# Patient Record
Sex: FEMALE | Race: WHITE | NOT HISPANIC OR LATINO
[De-identification: names, ages, dates, MRNs, and addresses within clinical notes are randomized per-mention and may not be internally consistent; named-entity substitution may affect disease eponyms.]

---

## 2022-05-04 PROBLEM — Z00.00 ENCOUNTER FOR PREVENTIVE HEALTH EXAMINATION: Status: ACTIVE | Noted: 2022-05-04

## 2022-05-10 ENCOUNTER — APPOINTMENT (OUTPATIENT)
Dept: CARDIOLOGY | Facility: CLINIC | Age: 82
End: 2022-05-10
Payer: COMMERCIAL

## 2022-05-10 VITALS — DIASTOLIC BLOOD PRESSURE: 80 MMHG | SYSTOLIC BLOOD PRESSURE: 135 MMHG

## 2022-05-10 VITALS — HEART RATE: 69 BPM | DIASTOLIC BLOOD PRESSURE: 90 MMHG | SYSTOLIC BLOOD PRESSURE: 136 MMHG

## 2022-05-10 VITALS — HEIGHT: 60 IN | WEIGHT: 177 LBS | TEMPERATURE: 97.6 F | BODY MASS INDEX: 34.75 KG/M2

## 2022-05-10 PROCEDURE — 99203 OFFICE O/P NEW LOW 30 MIN: CPT

## 2022-05-10 PROCEDURE — 93000 ELECTROCARDIOGRAM COMPLETE: CPT

## 2022-05-13 NOTE — REVIEW OF SYSTEMS
[Feeling Fatigued] : feeling fatigued [SOB] : shortness of breath [Dyspnea on exertion] : not dyspnea during exertion [Chest Discomfort] : no chest discomfort [Lower Ext Edema] : no extremity edema [Leg Claudication] : no intermittent leg claudication [Palpitations] : no palpitations [Orthopnea] : no orthopnea [Syncope] : no syncope [Cough] : no cough [Dizziness] : no dizziness

## 2022-05-13 NOTE — ASSESSMENT
[FreeTextEntry1] : Assessment:\par #Dyspnea on exertion\par - Has risk factors for CAD, need to rule out ischemia, valvular disease\par #DLD\par - 4/21/22 , , HDL 66,  not on statin\par #BMI 34\par \par Plan:\par - Start atorva 10 mg daily \par - TTE\par - Pharm nuc stress test \par - Counseled patient on diet, referral to dietician \par - Discussed with her PCP Dr. Caballero\par - Return to clinic in 1 month after testing\par \par

## 2022-05-13 NOTE — HISTORY OF PRESENT ILLNESS
[FreeTextEntry1] : 81F  (no gDM, gHTN, or preeclampsia) with DLD, hemorrhagic stroke 2010 s/p embo with residual L sided weakness here for evaluation of dyspnea on exertion. \par \par SOB with 1 flight of stairs, less than 50 feet. Takes 2-3 minutes to recover. No SOB at rest. No chest pain, unsure of leg swelling. \par \par Joint pain neck, back knees shoulders. Sciatica, recently prescribed prednisone. \par \par Quits smoking in her 20s \par \par No history of T2DM, never on treatment for hypertension\par \par BP at home\par 160/90 \par over 140 every other day \par \par no FMH CAD\par \par Has 9 cats\par \par

## 2022-05-13 NOTE — PHYSICAL EXAM
[Well Developed] : well developed [Well Nourished] : well nourished [No Acute Distress] : no acute distress [Normal Conjunctiva] : normal conjunctiva [Normal Venous Pressure] : normal venous pressure [No Carotid Bruit] : no carotid bruit [Normal S1, S2] : normal S1, S2 [No Murmur] : no murmur [No Rub] : no rub [No Gallop] : no gallop [Clear Lung Fields] : clear lung fields [Good Air Entry] : good air entry [No Respiratory Distress] : no respiratory distress  [Soft] : abdomen soft [Non Tender] : non-tender [Normal Bowel Sounds] : normal bowel sounds [Moves all extremities] : moves all extremities [No ulcers] : no ulcers [No edema] : no edema [No varicosities] : no varicosities [No chronic venous stasis changes] : no chronic venous stasis changes [No cyanosis] : no cyanosis [No rashes] : no rashes [Present] : present bilaterally [de-identified] : LUE/LLE weaker compared to RUE/RLE

## 2022-05-16 ENCOUNTER — TRANSCRIPTION ENCOUNTER (OUTPATIENT)
Age: 82
End: 2022-05-16

## 2022-06-06 ENCOUNTER — APPOINTMENT (OUTPATIENT)
Dept: CARDIOLOGY | Facility: CLINIC | Age: 82
End: 2022-06-06
Payer: COMMERCIAL

## 2022-06-06 PROCEDURE — 93306 TTE W/DOPPLER COMPLETE: CPT

## 2022-06-20 ENCOUNTER — RESULT REVIEW (OUTPATIENT)
Age: 82
End: 2022-06-20

## 2022-06-20 ENCOUNTER — OUTPATIENT (OUTPATIENT)
Dept: OUTPATIENT SERVICES | Facility: HOSPITAL | Age: 82
LOS: 1 days | Discharge: HOME | End: 2022-06-20
Payer: COMMERCIAL

## 2022-06-20 DIAGNOSIS — R06.00 DYSPNEA, UNSPECIFIED: ICD-10-CM

## 2022-06-20 PROCEDURE — 78452 HT MUSCLE IMAGE SPECT MULT: CPT | Mod: 26

## 2022-06-21 ENCOUNTER — APPOINTMENT (OUTPATIENT)
Dept: CARDIOLOGY | Facility: CLINIC | Age: 82
End: 2022-06-21
Payer: COMMERCIAL

## 2022-06-21 VITALS
WEIGHT: 178.6 LBS | DIASTOLIC BLOOD PRESSURE: 98 MMHG | TEMPERATURE: 97.2 F | SYSTOLIC BLOOD PRESSURE: 142 MMHG | BODY MASS INDEX: 35.06 KG/M2 | HEART RATE: 88 BPM | HEIGHT: 60 IN

## 2022-06-21 DIAGNOSIS — Z87.39 PERSONAL HISTORY OF OTHER DISEASES OF THE MUSCULOSKELETAL SYSTEM AND CONNECTIVE TISSUE: ICD-10-CM

## 2022-06-21 DIAGNOSIS — R06.02 SHORTNESS OF BREATH: ICD-10-CM

## 2022-06-21 DIAGNOSIS — Z86.79 PERSONAL HISTORY OF OTHER DISEASES OF THE CIRCULATORY SYSTEM: ICD-10-CM

## 2022-06-21 DIAGNOSIS — Z87.891 PERSONAL HISTORY OF NICOTINE DEPENDENCE: ICD-10-CM

## 2022-06-21 PROCEDURE — 99214 OFFICE O/P EST MOD 30 MIN: CPT

## 2022-06-21 RX ORDER — LEVOTHYROXINE SODIUM 0.03 MG/1
25 TABLET ORAL
Qty: 30 | Refills: 0 | Status: ACTIVE | COMMUNITY
Start: 2022-06-06

## 2022-06-22 PROBLEM — Z87.39 HISTORY OF BACKACHE: Status: RESOLVED | Noted: 2022-06-22 | Resolved: 2022-06-22

## 2022-06-22 PROBLEM — Z87.891 HISTORY OF TOBACCO USE: Status: ACTIVE | Noted: 2022-06-22

## 2022-06-22 PROBLEM — R06.02 SOB (SHORTNESS OF BREATH) ON EXERTION: Status: ACTIVE | Noted: 2022-06-22

## 2022-06-23 NOTE — HISTORY OF PRESENT ILLNESS
[FreeTextEntry1] : 82F  (no gDM, gHTN, or preeclampsia) with DLD, aneurysm, hemorrhagic stroke 2010 s/p embo with residual L sided weakness here for evaluation of dyspnea on exertion. \par \par Continues to have SOB. No chest pain, palpitations, lightheadedness/dizziness. \par \par \par BP at home\par ranging 130//90 \par over 140 every other day \par \par 5/10/22\par SOB with 1 flight of stairs, less than 50 feet. Takes 2-3 minutes to recover. No SOB at rest. No chest pain, unsure of leg swelling. \par \par Joint pain neck, back knees shoulders. Sciatica, recently prescribed prednisone. \par \par Quits smoking in her 20s \par \par No history of T2DM, never on treatment for hypertension\par \par \par \par \par

## 2022-06-23 NOTE — PHYSICAL EXAM
[Well Developed] : well developed [Well Nourished] : well nourished [No Acute Distress] : no acute distress [Normal Conjunctiva] : normal conjunctiva [Normal Venous Pressure] : normal venous pressure [No Carotid Bruit] : no carotid bruit [Normal S1, S2] : normal S1, S2 [No Murmur] : no murmur [No Rub] : no rub [No Gallop] : no gallop [Clear Lung Fields] : clear lung fields [Good Air Entry] : good air entry [No Respiratory Distress] : no respiratory distress  [Soft] : abdomen soft [Non Tender] : non-tender [Moves all extremities] : moves all extremities [No ulcers] : no ulcers [No edema] : no edema [No varicosities] : no varicosities [No chronic venous stasis changes] : no chronic venous stasis changes [No cyanosis] : no cyanosis [No rashes] : no rashes [Present] : present bilaterally [de-identified] : LUE/LLE weaker compared to RUE/RLE

## 2022-06-23 NOTE — ASSESSMENT
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

## 2022-06-23 NOTE — REVIEW OF SYSTEMS
[Feeling Fatigued] : feeling fatigued [SOB] : shortness of breath [Negative] : Heme/Lymph [Dyspnea on exertion] : dyspnea during exertion [Chest Discomfort] : no chest discomfort [Lower Ext Edema] : no extremity edema [Leg Claudication] : no intermittent leg claudication [Palpitations] : no palpitations [Orthopnea] : no orthopnea [Syncope] : no syncope [Cough] : no cough [Dizziness] : no dizziness

## 2022-06-23 NOTE — CARDIOLOGY SUMMARY
[de-identified] : EKG 5/10/22 Normal sinus rhythm 69 bpm [de-identified] : 6/8/22 TTE  EF 66%, no AI, no pHTN, hepatic cyst as per pt not a new finding  [de-identified] : Nuclear Stress test 6/21/22 EF 55% negative for ischemia\par

## 2022-08-17 ENCOUNTER — OUTPATIENT (OUTPATIENT)
Dept: OUTPATIENT SERVICES | Facility: HOSPITAL | Age: 82
LOS: 1 days | Discharge: HOME | End: 2022-08-17

## 2022-08-17 PROCEDURE — 93925 LOWER EXTREMITY STUDY: CPT | Mod: 26

## 2022-08-24 DIAGNOSIS — I73.9 PERIPHERAL VASCULAR DISEASE, UNSPECIFIED: ICD-10-CM

## 2022-11-16 ENCOUNTER — RX RENEWAL (OUTPATIENT)
Age: 82
End: 2022-11-16

## 2022-11-18 ENCOUNTER — APPOINTMENT (OUTPATIENT)
Dept: ORTHOPEDIC SURGERY | Facility: CLINIC | Age: 82
End: 2022-11-18

## 2022-11-18 VITALS — HEIGHT: 60 IN | WEIGHT: 180 LBS | BODY MASS INDEX: 35.34 KG/M2

## 2022-11-18 PROCEDURE — 73030 X-RAY EXAM OF SHOULDER: CPT | Mod: RT

## 2022-11-18 PROCEDURE — 99203 OFFICE O/P NEW LOW 30 MIN: CPT | Mod: 25

## 2022-11-18 PROCEDURE — 20610 DRAIN/INJ JOINT/BURSA W/O US: CPT | Mod: RT

## 2022-11-18 NOTE — PROCEDURE
[Large Joint Injection] : Large joint injection [Right] : of the right [Shoulder] : shoulder [Pain] : pain [Alcohol] : alcohol [Ethyl Chloride sprayed topically] : ethyl chloride sprayed topically [Sterile technique used] : sterile technique used [___ cc    1%] : Lidocaine ~Vcc of 1%  [___ cc    4mg] : Dexamethasone (Decadron) ~Vcc of 4 mg  [] : Patient tolerated procedure well

## 2022-11-26 NOTE — HISTORY OF PRESENT ILLNESS
[de-identified] : [age] year of female here for an evaluation of pain to the right shoulder, patient that this time the pain is quite severe, she woke up with severe pain, she is unable to lift her arm.  Patient states that she was seen by Dr. Murphy in the past, patient states that surgical treatment is out of question for her.\par  patient states that she has similar pain in the past she had a cortisone injection with significant improvement of symptoms .

## 2022-11-26 NOTE — DISCUSSION/SUMMARY
[de-identified] : IMPRESSION: Right shoulder pain -possible rotator cuff tear\par \par PLAN:  Patient was over a cortisone injection, patient agrees.  \par I offer physical therapy, patient kindly decline, patient will continue to take over-the-counter anti-inflammatories for pain.  \par Patient is taking oral prednisone.  Patient was advised to follow up on as per needed basis.\par \par FOLLOW-UP:PRN\par \par SUPERVISING PHYSICIAN DR. YOUSSEF\par

## 2022-11-26 NOTE — IMAGING
[de-identified] :  examination of the right shoulder, there is no swelling, no ecchymosis, patient has some atrophy of the rotator cuff, patient has decreased range of motion due to pain.  \par Decreased motor strength to all planes.  \par Negative drop-arm test.  \par Neurovascular intact.\par \par   X-ray of the right shoulder was done in office today, negative for any acute fracture or dislocation.

## 2022-12-26 ENCOUNTER — INPATIENT (INPATIENT)
Facility: HOSPITAL | Age: 82
LOS: 18 days | Discharge: HOME HEALTH | End: 2023-01-14
Attending: INTERNAL MEDICINE | Admitting: INTERNAL MEDICINE
Payer: COMMERCIAL

## 2022-12-26 VITALS
OXYGEN SATURATION: 96 % | HEIGHT: 61 IN | WEIGHT: 179.9 LBS | TEMPERATURE: 98 F | RESPIRATION RATE: 18 BRPM | DIASTOLIC BLOOD PRESSURE: 79 MMHG | SYSTOLIC BLOOD PRESSURE: 139 MMHG | HEART RATE: 123 BPM

## 2022-12-26 DIAGNOSIS — Z98.890 OTHER SPECIFIED POSTPROCEDURAL STATES: Chronic | ICD-10-CM

## 2022-12-26 DIAGNOSIS — Z90.49 ACQUIRED ABSENCE OF OTHER SPECIFIED PARTS OF DIGESTIVE TRACT: Chronic | ICD-10-CM

## 2022-12-26 LAB
ALBUMIN SERPL ELPH-MCNC: 3.9 G/DL — SIGNIFICANT CHANGE UP (ref 3.5–5.2)
ALP SERPL-CCNC: 93 U/L — SIGNIFICANT CHANGE UP (ref 30–115)
ALT FLD-CCNC: 14 U/L — SIGNIFICANT CHANGE UP (ref 0–41)
ANION GAP SERPL CALC-SCNC: 13 MMOL/L — SIGNIFICANT CHANGE UP (ref 7–14)
AST SERPL-CCNC: 23 U/L — SIGNIFICANT CHANGE UP (ref 0–41)
BASE EXCESS BLDV CALC-SCNC: 2.2 MMOL/L — SIGNIFICANT CHANGE UP (ref -2–3)
BASOPHILS # BLD AUTO: 0.05 K/UL — SIGNIFICANT CHANGE UP (ref 0–0.2)
BASOPHILS NFR BLD AUTO: 0.4 % — SIGNIFICANT CHANGE UP (ref 0–1)
BILIRUB SERPL-MCNC: 1 MG/DL — SIGNIFICANT CHANGE UP (ref 0.2–1.2)
BUN SERPL-MCNC: 14 MG/DL — SIGNIFICANT CHANGE UP (ref 10–20)
CA-I SERPL-SCNC: 1.09 MMOL/L — LOW (ref 1.15–1.33)
CALCIUM SERPL-MCNC: 9.2 MG/DL — SIGNIFICANT CHANGE UP (ref 8.4–10.5)
CHLORIDE SERPL-SCNC: 99 MMOL/L — SIGNIFICANT CHANGE UP (ref 98–110)
CO2 SERPL-SCNC: 23 MMOL/L — SIGNIFICANT CHANGE UP (ref 17–32)
CREAT SERPL-MCNC: 0.8 MG/DL — SIGNIFICANT CHANGE UP (ref 0.7–1.5)
EGFR: 74 ML/MIN/1.73M2 — SIGNIFICANT CHANGE UP
EOSINOPHIL # BLD AUTO: 0.17 K/UL — SIGNIFICANT CHANGE UP (ref 0–0.7)
EOSINOPHIL NFR BLD AUTO: 1.4 % — SIGNIFICANT CHANGE UP (ref 0–8)
FLUAV AG NPH QL: SIGNIFICANT CHANGE UP
FLUBV AG NPH QL: SIGNIFICANT CHANGE UP
GAS PNL BLDV: 133 MMOL/L — LOW (ref 136–145)
GAS PNL BLDV: SIGNIFICANT CHANGE UP
GLUCOSE SERPL-MCNC: 94 MG/DL — SIGNIFICANT CHANGE UP (ref 70–99)
HCO3 BLDV-SCNC: 25 MMOL/L — SIGNIFICANT CHANGE UP (ref 22–29)
HCT VFR BLD CALC: 41.7 % — SIGNIFICANT CHANGE UP (ref 37–47)
HCT VFR BLDA CALC: 39 % — SIGNIFICANT CHANGE UP (ref 39–51)
HGB BLD-MCNC: 13.3 G/DL — SIGNIFICANT CHANGE UP (ref 12–16)
IMM GRANULOCYTES NFR BLD AUTO: 0.4 % — HIGH (ref 0.1–0.3)
LACTATE BLDV-MCNC: 1.5 MMOL/L — SIGNIFICANT CHANGE UP (ref 0.5–2)
LIDOCAIN IGE QN: 19 U/L — SIGNIFICANT CHANGE UP (ref 7–60)
LYMPHOCYTES # BLD AUTO: 1.67 K/UL — SIGNIFICANT CHANGE UP (ref 1.2–3.4)
LYMPHOCYTES # BLD AUTO: 13.9 % — LOW (ref 20.5–51.1)
MAGNESIUM SERPL-MCNC: 2 MG/DL — SIGNIFICANT CHANGE UP (ref 1.8–2.4)
MCHC RBC-ENTMCNC: 30.5 PG — SIGNIFICANT CHANGE UP (ref 27–31)
MCHC RBC-ENTMCNC: 31.9 G/DL — LOW (ref 32–37)
MCV RBC AUTO: 95.6 FL — SIGNIFICANT CHANGE UP (ref 81–99)
MONOCYTES # BLD AUTO: 1.1 K/UL — HIGH (ref 0.1–0.6)
MONOCYTES NFR BLD AUTO: 9.2 % — SIGNIFICANT CHANGE UP (ref 1.7–9.3)
NEUTROPHILS # BLD AUTO: 8.97 K/UL — HIGH (ref 1.4–6.5)
NEUTROPHILS NFR BLD AUTO: 74.7 % — SIGNIFICANT CHANGE UP (ref 42.2–75.2)
NRBC # BLD: 0 /100 WBCS — SIGNIFICANT CHANGE UP (ref 0–0)
NT-PROBNP SERPL-SCNC: 144 PG/ML — SIGNIFICANT CHANGE UP (ref 0–300)
PCO2 BLDV: 33 MMHG — LOW (ref 39–42)
PH BLDV: 7.49 — HIGH (ref 7.32–7.43)
PLATELET # BLD AUTO: 233 K/UL — SIGNIFICANT CHANGE UP (ref 130–400)
PO2 BLDV: 46 MMHG — SIGNIFICANT CHANGE UP
POTASSIUM BLDV-SCNC: 4.5 MMOL/L — SIGNIFICANT CHANGE UP (ref 3.5–5.1)
POTASSIUM SERPL-MCNC: 4.5 MMOL/L — SIGNIFICANT CHANGE UP (ref 3.5–5)
POTASSIUM SERPL-SCNC: 4.5 MMOL/L — SIGNIFICANT CHANGE UP (ref 3.5–5)
PROT SERPL-MCNC: 6.4 G/DL — SIGNIFICANT CHANGE UP (ref 6–8)
RBC # BLD: 4.36 M/UL — SIGNIFICANT CHANGE UP (ref 4.2–5.4)
RBC # FLD: 13.2 % — SIGNIFICANT CHANGE UP (ref 11.5–14.5)
RSV RNA NPH QL NAA+NON-PROBE: SIGNIFICANT CHANGE UP
SARS-COV-2 RNA SPEC QL NAA+PROBE: SIGNIFICANT CHANGE UP
SODIUM SERPL-SCNC: 135 MMOL/L — SIGNIFICANT CHANGE UP (ref 135–146)
TROPONIN T SERPL-MCNC: <0.01 NG/ML — SIGNIFICANT CHANGE UP
WBC # BLD: 12.01 K/UL — HIGH (ref 4.8–10.8)
WBC # FLD AUTO: 12.01 K/UL — HIGH (ref 4.8–10.8)

## 2022-12-26 PROCEDURE — 99285 EMERGENCY DEPT VISIT HI MDM: CPT

## 2022-12-26 PROCEDURE — 99223 1ST HOSP IP/OBS HIGH 75: CPT

## 2022-12-26 PROCEDURE — 93010 ELECTROCARDIOGRAM REPORT: CPT

## 2022-12-26 PROCEDURE — 71275 CT ANGIOGRAPHY CHEST: CPT | Mod: 26,MA

## 2022-12-26 PROCEDURE — 71045 X-RAY EXAM CHEST 1 VIEW: CPT | Mod: 26

## 2022-12-26 RX ORDER — MORPHINE SULFATE 50 MG/1
4 CAPSULE, EXTENDED RELEASE ORAL ONCE
Refills: 0 | Status: DISCONTINUED | OUTPATIENT
Start: 2022-12-26 | End: 2022-12-26

## 2022-12-26 RX ORDER — SODIUM CHLORIDE 9 MG/ML
500 INJECTION INTRAMUSCULAR; INTRAVENOUS; SUBCUTANEOUS ONCE
Refills: 0 | Status: COMPLETED | OUTPATIENT
Start: 2022-12-26 | End: 2022-12-26

## 2022-12-26 RX ORDER — MORPHINE SULFATE 50 MG/1
4 CAPSULE, EXTENDED RELEASE ORAL EVERY 4 HOURS
Refills: 0 | Status: DISCONTINUED | OUTPATIENT
Start: 2022-12-26 | End: 2023-01-02

## 2022-12-26 RX ORDER — AMLODIPINE BESYLATE 2.5 MG/1
2.5 TABLET ORAL DAILY
Refills: 0 | Status: DISCONTINUED | OUTPATIENT
Start: 2022-12-26 | End: 2023-01-14

## 2022-12-26 RX ORDER — TRAMADOL HYDROCHLORIDE 50 MG/1
50 TABLET ORAL EVERY 4 HOURS
Refills: 0 | Status: DISCONTINUED | OUTPATIENT
Start: 2022-12-26 | End: 2023-01-02

## 2022-12-26 RX ORDER — ENOXAPARIN SODIUM 100 MG/ML
40 INJECTION SUBCUTANEOUS EVERY 24 HOURS
Refills: 0 | Status: DISCONTINUED | OUTPATIENT
Start: 2022-12-26 | End: 2023-01-14

## 2022-12-26 RX ORDER — AZITHROMYCIN 500 MG/1
500 TABLET, FILM COATED ORAL EVERY 24 HOURS
Refills: 0 | Status: DISCONTINUED | OUTPATIENT
Start: 2022-12-26 | End: 2022-12-28

## 2022-12-26 RX ORDER — IPRATROPIUM/ALBUTEROL SULFATE 18-103MCG
3 AEROSOL WITH ADAPTER (GRAM) INHALATION EVERY 6 HOURS
Refills: 0 | Status: DISCONTINUED | OUTPATIENT
Start: 2022-12-26 | End: 2023-01-14

## 2022-12-26 RX ORDER — CEFTRIAXONE 500 MG/1
1000 INJECTION, POWDER, FOR SOLUTION INTRAMUSCULAR; INTRAVENOUS EVERY 24 HOURS
Refills: 0 | Status: DISCONTINUED | OUTPATIENT
Start: 2022-12-26 | End: 2022-12-28

## 2022-12-26 RX ORDER — ACETAMINOPHEN 500 MG
650 TABLET ORAL EVERY 6 HOURS
Refills: 0 | Status: DISCONTINUED | OUTPATIENT
Start: 2022-12-26 | End: 2023-01-03

## 2022-12-26 RX ORDER — ALBUTEROL 90 UG/1
1 AEROSOL, METERED ORAL EVERY 6 HOURS
Refills: 0 | Status: DISCONTINUED | OUTPATIENT
Start: 2022-12-26 | End: 2023-01-14

## 2022-12-26 RX ORDER — AZITHROMYCIN 500 MG/1
500 TABLET, FILM COATED ORAL ONCE
Refills: 0 | Status: COMPLETED | OUTPATIENT
Start: 2022-12-26 | End: 2022-12-26

## 2022-12-26 RX ORDER — CEFTRIAXONE 500 MG/1
1000 INJECTION, POWDER, FOR SOLUTION INTRAMUSCULAR; INTRAVENOUS ONCE
Refills: 0 | Status: COMPLETED | OUTPATIENT
Start: 2022-12-26 | End: 2022-12-26

## 2022-12-26 RX ORDER — ATORVASTATIN CALCIUM 80 MG/1
10 TABLET, FILM COATED ORAL AT BEDTIME
Refills: 0 | Status: DISCONTINUED | OUTPATIENT
Start: 2022-12-26 | End: 2023-01-14

## 2022-12-26 RX ORDER — ACETAMINOPHEN 500 MG
975 TABLET ORAL ONCE
Refills: 0 | Status: COMPLETED | OUTPATIENT
Start: 2022-12-26 | End: 2022-12-26

## 2022-12-26 RX ORDER — LEVOTHYROXINE SODIUM 125 MCG
25 TABLET ORAL DAILY
Refills: 0 | Status: DISCONTINUED | OUTPATIENT
Start: 2022-12-26 | End: 2023-01-14

## 2022-12-26 RX ADMIN — CEFTRIAXONE 100 MILLIGRAM(S): 500 INJECTION, POWDER, FOR SOLUTION INTRAMUSCULAR; INTRAVENOUS at 23:33

## 2022-12-26 RX ADMIN — MORPHINE SULFATE 4 MILLIGRAM(S): 50 CAPSULE, EXTENDED RELEASE ORAL at 19:35

## 2022-12-26 RX ADMIN — AZITHROMYCIN 255 MILLIGRAM(S): 500 TABLET, FILM COATED ORAL at 23:43

## 2022-12-26 RX ADMIN — ATORVASTATIN CALCIUM 10 MILLIGRAM(S): 80 TABLET, FILM COATED ORAL at 23:44

## 2022-12-26 RX ADMIN — AZITHROMYCIN 255 MILLIGRAM(S): 500 TABLET, FILM COATED ORAL at 21:00

## 2022-12-26 RX ADMIN — MORPHINE SULFATE 4 MILLIGRAM(S): 50 CAPSULE, EXTENDED RELEASE ORAL at 16:15

## 2022-12-26 RX ADMIN — SODIUM CHLORIDE 1000 MILLILITER(S): 9 INJECTION INTRAMUSCULAR; INTRAVENOUS; SUBCUTANEOUS at 15:37

## 2022-12-26 RX ADMIN — Medication 975 MILLIGRAM(S): at 15:36

## 2022-12-26 NOTE — H&P ADULT - NSICDXPASTMEDICALHX_GEN_ALL_CORE_FT
PAST MEDICAL HISTORY:  Hyperlipemia     Hypothyroid      PAST MEDICAL HISTORY:  Brain aneurysm     Hyperlipemia     Hypothyroid

## 2022-12-26 NOTE — H&P ADULT - HISTORY OF PRESENT ILLNESS
82y F pmhx of DLD, hypothyroid, chronic back pain, hx cerebral aneurysm s/p coiling p/w upper back pain and SOB.    82y F pmhx of DLD, hypothyroid, chronic back pain, hx cerebral aneurysm s/p coiling (2010) p/w upper back pain and SOB. Pt states that for months she has been having SOB on exertion, she had a cardiac work up with no clear explantation for her dyspnea. She states it has progressively worsened, today she is dyspneic while talking, sitting up in stretcher. C/o back pain. Denies fever, chills, cp, cough, n/v/d, dysuria.  CT chest negative for PE, found to have b/l opacities, atypical/viral infection versus chronic interstitial lung disease.

## 2022-12-26 NOTE — H&P ADULT - NSHPLABSRESULTS_GEN_ALL_CORE
13.3   12.01 )-----------( 233      ( 26 Dec 2022 15:10 )             41.7   12-26    135  |  99  |  14  ----------------------------<  94  4.5   |  23  |  0.8    Ca    9.2      26 Dec 2022 15:10  Mg     2.0     12-26    TPro  6.4  /  Alb  3.9  /  TBili  1.0  /  DBili  x   /  AST  23  /  ALT  14  /  AlkPhos  93  12-26    CT chest   IMPRESSION:    No evidence of a pulmonary embolism.    Diffuse bilateral interstitial opacities noted. No focal consolidation pleural effusion or pneumothorax. No bronchiectasis or honeycombing. Findings may be on the basis of atypical/viral infection versus chronic interstitial lung disease. Clinical correlation is advised. Recommend a three-month follow-up chest CT, high resolution protocol.    7.0 x 7.0 cm right hepatic dome simple appearing cystic lesion. Recommend a 12 month follow-up right upper quadrant ultrasound to assess for stability.        · EKG Date/Time: 26-Dec-2022 14:30  · Rate: 115  · Interpretation: sinus tach  · NC: 148  · QRS: 72  · QTC: 431  · ST/T Wave: no changes

## 2022-12-26 NOTE — H&P ADULT - ASSESSMENT
82y F pmhx of DLD, hypothyroid, chronic back pain, hx cerebral aneurysm s/p coiling p/w upper back pain and SOB.     #Viral infection versus chronic interstitial lung disease  #Dyspnea on exertion  - admit to medicine   - pulmonary consult   - c/w IV abx   - supplemental O2  - nebs prn  - f/u labs, mycoplasma, legionella, procal, MRSA, RVP  - ID consult     # h/o HTN   c/w amlodipine     # h/o DLD  - c/w statin     #diet - DASH   #DVT ppx        82y F pmhx of DLD, hypothyroid, chronic back pain, hx cerebral aneurysm s/p coiling p/w upper back pain and dyspnea.     #Viral infection versus chronic interstitial lung disease  #Dyspnea  - admit to medicine   - pulmonary consult   - c/w IV abx   - supplemental O2  - nebs prn  - f/u labs, mycoplasma, legionella, procal, MRSA, RVP  - ID consult     #back pain   - pain cotnrol   # h/o HTN   c/w amlodipine     # h/o DLD  - c/w statin     #diet - DASH   #DVT ppx

## 2022-12-26 NOTE — H&P ADULT - NSHPPHYSICALEXAM_GEN_ALL_CORE
ICU Vital Signs Last 24 Hrs  T(C): 38.3 (26 Dec 2022 16:08), Max: 38.3 (26 Dec 2022 16:08)  T(F): 100.9 (26 Dec 2022 16:08), Max: 100.9 (26 Dec 2022 16:08)  HR: 109 (26 Dec 2022 18:58) (109 - 123)  BP: 142/65 (26 Dec 2022 18:58) (139/79 - 142/65)  BP(mean): --  ABP: --  ABP(mean): --  RR: 28 (26 Dec 2022 18:58) (18 - 28)  SpO2: 100% (26 Dec 2022 18:58) (96% - 100%)    O2 Parameters below as of 26 Dec 2022 18:58  Patient On (Oxygen Delivery Method): nasal cannula  O2 Flow (L/min): 4

## 2022-12-26 NOTE — ED PROVIDER NOTE - PHYSICAL EXAMINATION
Vital Signs: I have reviewed the initial vital signs.  Constitutional: well-nourished, appears stated age, nontoxic but in pain/splinting  HEENT: NC/AT, PERRLA, EOMI, conjunctivae pink, sclerae anicteric, mmm, oropharynx clear  Neck: supple,  no goiter, no meningismus  Cardiovascular tachy, reg no murmurs  Respiratory: dim bs at bases no w/r/r  Gastrointestinal: +bs, snt/nd  Musculoskeletal: FROM x 4 no c/c/e, pulses equal calves nontender; +ttp over T5/6 no stepoff  Integumentary: warm, dry, +erythema ab igne over buttocks, erythema over right flank (heating pad), excoriations ot back  Neurologic: awake, alert, cranial nerves II-XII grossly intact, extremities’ motor and sensory functions grossly intact  Psychiatric: appropriate mood, appropriate affect

## 2022-12-26 NOTE — H&P ADULT - NS ATTEND AMEND GEN_ALL_CORE FT
Seen while in the ER, agree with assessment and plan as outlined Seen while in the ER, agree with assessment (based on abnormal CAT of chest) and plan as outlined

## 2022-12-26 NOTE — ED PROVIDER NOTE - OBJECTIVE STATEMENT
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 chronic back pain, dyslipidemia, hypothyroidism, cerebral aneurysm status post coiling, presents complaining of a few days of mid upper back pain worse with movement and deep inspiration which is caused her to be more short of breath than usual, patient has had shortness of breath for the last 6 months and has been seen by both cardiology and pulmonary with no explanation provided for her symptoms, patient denies chest pain, denies cough or fever, has no vomiting or diarrhea, has no numbness or weakness, patient did trip over her heating pad cord and fell to knees yesterday but this was after onset of her pain, no head strike and had no injury from this episode and has been ambulatory since

## 2022-12-26 NOTE — ED PROVIDER NOTE - NS ED ROS FT
Constitutional: (-) fever  Eyes/ENT: (-) blurry vision, (-) epistaxis  Cardiovascular: (-) chest pain, (-) syncope  Respiratory: (-) cough, (+) shortness of breath  Gastrointestinal: (-) vomiting, (-) diarrhea  Musculoskeletal: see HPI  Integumentary: (-) rash, (-) edema  Neurological: (-) headache, (-) altered mental status  Psychiatric: (-) hallucinations  Allergic/Immunologic: (-) pruritus

## 2022-12-26 NOTE — ED ADULT NURSE NOTE - ALCOHOL PRE SCREEN (AUDIT - C)
Ordering Provider: Dr. Charles     Patient here to receive Hydroxyprogesterone to Left ventrogluteal. Tolerated well, no reaction noted. Instructed to wait 15 minutes after administration for monitoring.      Pre pain scale: none     Post pain scale: none       Statement Selected

## 2022-12-27 LAB
ANION GAP SERPL CALC-SCNC: 15 MMOL/L — HIGH (ref 7–14)
BUN SERPL-MCNC: 17 MG/DL — SIGNIFICANT CHANGE UP (ref 10–20)
CALCIUM SERPL-MCNC: 9 MG/DL — SIGNIFICANT CHANGE UP (ref 8.4–10.5)
CHLORIDE SERPL-SCNC: 103 MMOL/L — SIGNIFICANT CHANGE UP (ref 98–110)
CO2 SERPL-SCNC: 23 MMOL/L — SIGNIFICANT CHANGE UP (ref 17–32)
CREAT SERPL-MCNC: 0.7 MG/DL — SIGNIFICANT CHANGE UP (ref 0.7–1.5)
EGFR: 87 ML/MIN/1.73M2 — SIGNIFICANT CHANGE UP
GLUCOSE SERPL-MCNC: 70 MG/DL — SIGNIFICANT CHANGE UP (ref 70–99)
HCT VFR BLD CALC: 40 % — SIGNIFICANT CHANGE UP (ref 37–47)
HGB BLD-MCNC: 12.4 G/DL — SIGNIFICANT CHANGE UP (ref 12–16)
MCHC RBC-ENTMCNC: 30.4 PG — SIGNIFICANT CHANGE UP (ref 27–31)
MCHC RBC-ENTMCNC: 31 G/DL — LOW (ref 32–37)
MCV RBC AUTO: 98 FL — SIGNIFICANT CHANGE UP (ref 81–99)
NRBC # BLD: 0 /100 WBCS — SIGNIFICANT CHANGE UP (ref 0–0)
PLATELET # BLD AUTO: 212 K/UL — SIGNIFICANT CHANGE UP (ref 130–400)
POTASSIUM SERPL-MCNC: 3.8 MMOL/L — SIGNIFICANT CHANGE UP (ref 3.5–5)
POTASSIUM SERPL-SCNC: 3.8 MMOL/L — SIGNIFICANT CHANGE UP (ref 3.5–5)
RBC # BLD: 4.08 M/UL — LOW (ref 4.2–5.4)
RBC # FLD: 13.2 % — SIGNIFICANT CHANGE UP (ref 11.5–14.5)
SODIUM SERPL-SCNC: 141 MMOL/L — SIGNIFICANT CHANGE UP (ref 135–146)
WBC # BLD: 10.06 K/UL — SIGNIFICANT CHANGE UP (ref 4.8–10.8)
WBC # FLD AUTO: 10.06 K/UL — SIGNIFICANT CHANGE UP (ref 4.8–10.8)

## 2022-12-27 PROCEDURE — 36573 INSJ PICC RS&I 5 YR+: CPT

## 2022-12-27 PROCEDURE — 99232 SBSQ HOSP IP/OBS MODERATE 35: CPT

## 2022-12-27 RX ORDER — ZOLPIDEM TARTRATE 10 MG/1
5 TABLET ORAL AT BEDTIME
Refills: 0 | Status: DISCONTINUED | OUTPATIENT
Start: 2022-12-27 | End: 2022-12-29

## 2022-12-27 RX ORDER — AZITHROMYCIN 500 MG/1
500 TABLET, FILM COATED ORAL ONCE
Refills: 0 | Status: COMPLETED | OUTPATIENT
Start: 2022-12-27 | End: 2022-12-27

## 2022-12-27 RX ADMIN — ENOXAPARIN SODIUM 40 MILLIGRAM(S): 100 INJECTION SUBCUTANEOUS at 05:06

## 2022-12-27 RX ADMIN — MORPHINE SULFATE 4 MILLIGRAM(S): 50 CAPSULE, EXTENDED RELEASE ORAL at 10:41

## 2022-12-27 RX ADMIN — MORPHINE SULFATE 4 MILLIGRAM(S): 50 CAPSULE, EXTENDED RELEASE ORAL at 00:10

## 2022-12-27 RX ADMIN — CEFTRIAXONE 100 MILLIGRAM(S): 500 INJECTION, POWDER, FOR SOLUTION INTRAMUSCULAR; INTRAVENOUS at 10:43

## 2022-12-27 RX ADMIN — MORPHINE SULFATE 4 MILLIGRAM(S): 50 CAPSULE, EXTENDED RELEASE ORAL at 18:14

## 2022-12-27 RX ADMIN — MORPHINE SULFATE 4 MILLIGRAM(S): 50 CAPSULE, EXTENDED RELEASE ORAL at 11:35

## 2022-12-27 RX ADMIN — ZOLPIDEM TARTRATE 5 MILLIGRAM(S): 10 TABLET ORAL at 02:53

## 2022-12-27 RX ADMIN — AMLODIPINE BESYLATE 2.5 MILLIGRAM(S): 2.5 TABLET ORAL at 05:09

## 2022-12-27 RX ADMIN — Medication 3 MILLILITER(S): at 14:58

## 2022-12-27 RX ADMIN — Medication 40 MILLIGRAM(S): at 21:25

## 2022-12-27 RX ADMIN — ATORVASTATIN CALCIUM 10 MILLIGRAM(S): 80 TABLET, FILM COATED ORAL at 21:20

## 2022-12-27 RX ADMIN — MORPHINE SULFATE 4 MILLIGRAM(S): 50 CAPSULE, EXTENDED RELEASE ORAL at 18:30

## 2022-12-27 RX ADMIN — TRAMADOL HYDROCHLORIDE 50 MILLIGRAM(S): 50 TABLET ORAL at 10:00

## 2022-12-27 RX ADMIN — TRAMADOL HYDROCHLORIDE 50 MILLIGRAM(S): 50 TABLET ORAL at 09:09

## 2022-12-27 RX ADMIN — AZITHROMYCIN 500 MILLIGRAM(S): 500 TABLET, FILM COATED ORAL at 04:51

## 2022-12-27 NOTE — CHART NOTE - NSCHARTNOTEFT_GEN_A_CORE
Staff unable to restart IV, actually not needed until next antibiotics doses in the evening (Rocephin and Zithromax are ordered every 24 hours). Will ask day team to attempt

## 2022-12-27 NOTE — CONSULT NOTE ADULT - SUBJECTIVE AND OBJECTIVE BOX
WILFREDO CHO  82y, Female  Allergy: Compazine (Unknown)      CHIEF COMPLAINT:     LOS  1d    HPI:  82y F pmhx of DLD, hypothyroid, chronic back pain, hx cerebral aneurysm s/p coiling (2010) p/w upper back pain and SOB. Pt states that for months she has been having SOB on exertion, she had a cardiac work up with no clear explantation for her dyspnea. She states it has progressively worsened, today she is dyspneic while talking, sitting up in stretcher. C/o back pain. Denies fever, chills, cp, cough, n/v/d, dysuria.  CT chest negative for PE, found to have b/l opacities, atypical/viral infection versus chronic interstitial lung disease.        (26 Dec 2022 19:44)      INFECTIOUS DISEASE HISTORY:  History as above.      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)        ROS  General: Denies rigors, nightsweats  HEENT: Denies headache, rhinorrhea, sore throat, eye pain  CV: Denies CP, palpitations  PULM: Denies wheezing, hemoptysis  GI: Denies hematemesis, hematochezia, melena  : Denies discharge, hematuria  MSK: Denies arthralgias, myalgias  SKIN: Denies rash, lesions  NEURO: Denies paresthesias, weakness  PSYCH: Denies depression, anxiety    VITALS:  T(F): 97.8, Max: 100.9 (12-26-22 @ 16:08)  HR: 99  BP: 129/74  RR: 28Vital Signs Last 24 Hrs  T(C): 36.6 (27 Dec 2022 14:08), Max: 38.3 (26 Dec 2022 16:08)  T(F): 97.8 (27 Dec 2022 14:08), Max: 100.9 (26 Dec 2022 16:08)  HR: 99 (27 Dec 2022 14:08) (99 - 123)  BP: 129/74 (27 Dec 2022 14:08) (129/74 - 177/93)  BP(mean): --  RR: 28 (26 Dec 2022 18:58) (18 - 28)  SpO2: 100% (26 Dec 2022 18:58) (96% - 100%)    Parameters below as of 26 Dec 2022 18:58  Patient On (Oxygen Delivery Method): nasal cannula  O2 Flow (L/min): 4      PHYSICAL EXAM:  Gen: NAD, resting in bed  HEENT: Normocephalic, atraumatic  Neck: supple, no lymphadenopathy  CV: Regular rate & regular rhythm  Lungs: decreased BS at bases, no fremitus  Abdomen: Soft, BS present  Ext: Warm, well perfused  Neuro: non focal, awake  Skin: no rash, no erythema  Lines: no phlebitis    TESTS & MEASUREMENTS:                        12.4   10.06 )-----------( 212      ( 27 Dec 2022 08:05 )             40.0     12-27    141  |  103  |  17  ----------------------------<  70  3.8   |  23  |  0.7    Ca    9.0      27 Dec 2022 08:05  Mg     2.0     12-26    TPro  6.4  /  Alb  3.9  /  TBili  1.0  /  DBili  x   /  AST  23  /  ALT  14  /  AlkPhos  93  12-26      LIVER FUNCTIONS - ( 26 Dec 2022 15:10 )  Alb: 3.9 g/dL / Pro: 6.4 g/dL / ALK PHOS: 93 U/L / ALT: 14 U/L / AST: 23 U/L / GGT: x                 Blood Gas Venous - Lactate: 1.50 mmol/L (12-26-22 @ 15:10)      INFECTIOUS DISEASES TESTING      RADIOLOGY & ADDITIONAL TESTS:  I have personally reviewed the last Chest xray  CXR  Xray Chest 1 View- PORTABLE-Urgent:   ACC: [id number] EXAM:  XR CHEST PORTABLE URGENT 1V                          PROCEDURE DATE:  12/26/2022          INTERPRETATION:  Clinical History / Reason for exam: Chest pain.    Comparison : Chest radiograph 9/22/2016.    Technique/Positioning: Single frontal chest x-ray obtained.    Findings:    Support devices: None.    Cardiac/mediastinum/hilum: Unremarkable.    Lung parenchyma/Pleura: Within normal limits.    Skeleton/soft tissues: Degenerative change, thoracic spine.    Impression:    No radiographic evidence of acute cardiopulmonary disease.    Is    --- End of Report ---            GILBERTO BONILLA MD; Attending Radiologist  This document has been electronically signed. Dec 26 2022  5:37PM (12-26-22 @ 15:55)      CT  CT Angio Chest PE Protocol w/ IV Cont:   ACC: [id number] EXAM:  CT ANGIO CHEST PULJUANY TREADWELL                          PROCEDURE DATE:  12/26/2022          INTERPRETATION:  CLINICAL HISTORY: Back pain, shortness of breath.    TECHNIQUE: Multislice helical sections were obtained from the thoracic   inlet to the lung bases during rapid administration of intravenous   contrast. Thin sections were reconstructed through the pulmonary   vasculature. Coronal and sagittal reformatted images are also submitted.   MIP images were also added.    COMPARISON: None.    FINDINGS:    PULMONARY EMBOLUS: No pulmonary arterial filling defects.    LUNGS, PLEURA, AIRWAYS: Central airways are patent. Diffuse bilateral   interstitial opacities noted. No focal consolidation pleural effusion or   pneumothorax. No bronchiectasis or honeycombing. Findings may be on the   basis of atypical/viral infection versus chronic interstitial lung   disease.    THORACIC NODES: No mediastinal, hilar, or axillary lymphadenopathy.    MEDIASTINUM/GREAT VESSELS: No pericardial effusion. Heart size is within   normal limits. Mild dilation of the main pulmonary artery may reflect   pulmonary arterial hypertension.    BONES/SOFT TISSUES: Severe chronic compression deformity at T12. Moderate   chronic compression deformity at T11. Mild chronic compression deformity   at T5. Diffuse osteopenia. Healed right lower lateral rib fracture    VISUALIZED UPPER ABDOMEN: 7.0 x 7.0 cm right hepatic dome simple   appearing cystic lesion. The visualized upper abdomen is otherwise  grossly unremarkable      IMPRESSION:    No evidence of a pulmonary embolism.    Diffuse bilateral interstitial opacities noted. No focal consolidation   pleural effusion or pneumothorax. No bronchiectasis or honeycombing.   Findings may be on the basis of atypical/viral infection versus chronic   interstitial lung disease. Clinical correlation is advised. Recommend a   three-month follow-up chest CT, high resolution protocol.    7.0 x 7.0 cm right hepatic dome simple appearing cystic lesion. Recommend   a 12 month follow-up right upper quadrant ultrasound to assess for   stability.    --- End of Report ---            GÓMEZ GUZMAN MD; Attending Radiologist  This document has been electronically signed. Dec 26 2022  6:52PM (12-26-22 @ 17:56)      CARDIOLOGY TESTING  12 Lead ECG:   Ventricular Rate 102 BPM    Atrial Rate 102 BPM    P-R Interval 144 ms    QRS Duration 74 ms    Q-T Interval 344 ms    QTC Calculation(Bazett) 448 ms    P Axis 47 degrees    R Axis -40 degrees    T Axis 58 degrees    Diagnosis Line Sinus tachycardia  Left axis deviation  Anterior infarct , age undetermined  Abnormal ECG    Confirmed by MARIA ESTHER PADILLA MD ([id number]) on 12/27/2022 8:07:53 AM (12-26-22 @ 15:31)  12 Lead ECG:   Ventricular Rate 115 BPM    Atrial Rate 115 BPM    P-R Interval 148 ms    QRS Duration 72 ms    Q-T Interval 312 ms    QTC Calculation(Bazett) 431 ms    P Axis 48 degrees    R Axis -56 degrees    T Axis 72 degrees    Diagnosis Line Sinus tachycardia  Left axis deviation  Possible Anteroseptal infarct    Confirmed by MARIA ESTHER PADILLA MD ([id number]) on 12/27/2022 8:07:40 AM (12-26-22 @ 14:25)      MEDICATIONS  amLODIPine   Tablet 2.5 Oral daily  atorvastatin 10 Oral at bedtime  azithromycin  IVPB 500 IV Intermittent every 24 hours  cefTRIAXone   IVPB 1000 IV Intermittent every 24 hours  enoxaparin Injectable 40 SubCutaneous every 24 hours  levothyroxine 25 Oral daily      Weight  Weight (kg): 85.5 (12-26-22 @ 21:42)    ANTIBIOTICS:  azithromycin  IVPB 500 milliGRAM(s) IV Intermittent every 24 hours  cefTRIAXone   IVPB 1000 milliGRAM(s) IV Intermittent every 24 hours      ALLERGIES:  Compazine (Unknown)      ASSESSMENT  82y F pmhx of DLD, hypothyroid, chronic back pain, hx cerebral aneurysm s/p coiling (2010) p/w upper back pain and SOB      IMPRESSION  #Dyspnea  #Diffuse interstitial opacities - chronic ILD vs asyptical pviral infection   #Hx of cerebral aneurysm   #Chronic Back Pain     #Abx allergy: Compazine (Unknown)        RECOMMENDATIONS  This is a preliminary incomplete pended note, all final recommendations to follow after interview and examination of the patient.    Please call or message on [employer] Teams if with any questions.  Spectra [id number]     WILFREDO CHO  82y, Female  Allergy: Compazine (Unknown)      CHIEF COMPLAINT:     LOS  1d    HPI:  82y F pmhx of DLD, hypothyroid, chronic back pain, hx cerebral aneurysm s/p coiling (2010) p/w upper back pain and SOB. Pt states that for months she has been having SOB on exertion, she had a cardiac work up with no clear explantation for her dyspnea. She states it has progressively worsened, today she is dyspneic while talking, sitting up in stretcher. C/o back pain. Denies fever, chills, cp, cough, n/v/d, dysuria.  CT chest negative for PE, found to have b/l opacities, atypical/viral infection versus chronic interstitial lung disease.        (26 Dec 2022 19:44)      INFECTIOUS DISEASE HISTORY:  History as above.  She has had chronic shortness of breath for several months, but acute worsening in the past 5 days.   Coughing more severe to that point that it is causing thoracic back pain.   No pleuritic chest pain.   Found to be febrile here.   Denies any sick contacts.   No recent travels.   No recent antibiotics.       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)        ROS  General: Denies rigors, nightsweats  HEENT: Denies headache, rhinorrhea, sore throat, eye pain  CV: Denies CP, palpitations  PULM: Denies wheezing, hemoptysis  GI: Denies hematemesis, hematochezia, melena  : Denies discharge, hematuria  MSK: Denies arthralgias, myalgias  SKIN: Denies rash, lesions  NEURO: Denies paresthesias, weakness  PSYCH: Denies depression, anxiety    VITALS:  T(F): 97.8, Max: 100.9 (12-26-22 @ 16:08)  HR: 99  BP: 129/74  RR: 28Vital Signs Last 24 Hrs  T(C): 36.6 (27 Dec 2022 14:08), Max: 38.3 (26 Dec 2022 16:08)  T(F): 97.8 (27 Dec 2022 14:08), Max: 100.9 (26 Dec 2022 16:08)  HR: 99 (27 Dec 2022 14:08) (99 - 123)  BP: 129/74 (27 Dec 2022 14:08) (129/74 - 177/93)  BP(mean): --  RR: 28 (26 Dec 2022 18:58) (18 - 28)  SpO2: 100% (26 Dec 2022 18:58) (96% - 100%)    Parameters below as of 26 Dec 2022 18:58  Patient On (Oxygen Delivery Method): nasal cannula  O2 Flow (L/min): 4      PHYSICAL EXAM:  Gen: NAD, resting in bed  HEENT: Normocephalic, atraumatic  Neck: supple, no lymphadenopathy  CV: Regular rate & regular rhythm  Lungs: decreased BS at bases, no fremitus  Abdomen: Soft, BS present  Ext: Warm, well perfused  Neuro: non focal, awake  Skin: no rash, no erythema  Lines: no phlebitis    TESTS & MEASUREMENTS:                        12.4   10.06 )-----------( 212      ( 27 Dec 2022 08:05 )             40.0     12-27    141  |  103  |  17  ----------------------------<  70  3.8   |  23  |  0.7    Ca    9.0      27 Dec 2022 08:05  Mg     2.0     12-26    TPro  6.4  /  Alb  3.9  /  TBili  1.0  /  DBili  x   /  AST  23  /  ALT  14  /  AlkPhos  93  12-26      LIVER FUNCTIONS - ( 26 Dec 2022 15:10 )  Alb: 3.9 g/dL / Pro: 6.4 g/dL / ALK PHOS: 93 U/L / ALT: 14 U/L / AST: 23 U/L / GGT: x                 Blood Gas Venous - Lactate: 1.50 mmol/L (12-26-22 @ 15:10)      INFECTIOUS DISEASES TESTING      RADIOLOGY & ADDITIONAL TESTS:  I have personally reviewed the last Chest xray  CXR  Xray Chest 1 View- PORTABLE-Urgent:   ACC: [id number] EXAM:  XR CHEST PORTABLE URGENT 1V                          PROCEDURE DATE:  12/26/2022          INTERPRETATION:  Clinical History / Reason for exam: Chest pain.    Comparison : Chest radiograph 9/22/2016.    Technique/Positioning: Single frontal chest x-ray obtained.    Findings:    Support devices: None.    Cardiac/mediastinum/hilum: Unremarkable.    Lung parenchyma/Pleura: Within normal limits.    Skeleton/soft tissues: Degenerative change, thoracic spine.    Impression:    No radiographic evidence of acute cardiopulmonary disease.    Is    --- End of Report ---            GILBERTO BONILLA MD; Attending Radiologist  This document has been electronically signed. Dec 26 2022  5:37PM (12-26-22 @ 15:55)      CT  CT Angio Chest PE Protocol w/ IV Cont:   ACC: [id number] EXAM:  CT ANGIO CHEST PULM ART [facility]                          PROCEDURE DATE:  12/26/2022          INTERPRETATION:  CLINICAL HISTORY: Back pain, shortness of breath.    TECHNIQUE: Multislice helical sections were obtained from the thoracic   inlet to the lung bases during rapid administration of intravenous   contrast. Thin sections were reconstructed through the pulmonary   vasculature. Coronal and sagittal reformatted images are also submitted.   MIP images were also added.    COMPARISON: None.    FINDINGS:    PULMONARY EMBOLUS: No pulmonary arterial filling defects.    LUNGS, PLEURA, AIRWAYS: Central airways are patent. Diffuse bilateral   interstitial opacities noted. No focal consolidation pleural effusion or   pneumothorax. No bronchiectasis or honeycombing. Findings may be on the   basis of atypical/viral infection versus chronic interstitial lung   disease.    THORACIC NODES: No mediastinal, hilar, or axillary lymphadenopathy.    MEDIASTINUM/GREAT VESSELS: No pericardial effusion. Heart size is within   normal limits. Mild dilation of the main pulmonary artery may reflect   pulmonary arterial hypertension.    BONES/SOFT TISSUES: Severe chronic compression deformity at T12. Moderate   chronic compression deformity at T11. Mild chronic compression deformity   at T5. Diffuse osteopenia. Healed right lower lateral rib fracture    VISUALIZED UPPER ABDOMEN: 7.0 x 7.0 cm right hepatic dome simple   appearing cystic lesion. The visualized upper abdomen is otherwise  grossly unremarkable      IMPRESSION:    No evidence of a pulmonary embolism.    Diffuse bilateral interstitial opacities noted. No focal consolidation   pleural effusion or pneumothorax. No bronchiectasis or honeycombing.   Findings may be on the basis of atypical/viral infection versus chronic   interstitial lung disease. Clinical correlation is advised. Recommend a   three-month follow-up chest CT, high resolution protocol.    7.0 x 7.0 cm right hepatic dome simple appearing cystic lesion. Recommend   a 12 month follow-up right upper quadrant ultrasound to assess for   stability.    --- End of Report ---            GÓMEZ GUZMAN MD; Attending Radiologist  This document has been electronically signed. Dec 26 2022  6:52PM (12-26-22 @ 17:56)      CARDIOLOGY TESTING  12 Lead ECG:   Ventricular Rate 102 BPM    Atrial Rate 102 BPM    P-R Interval 144 ms    QRS Duration 74 ms    Q-T Interval 344 ms    QTC Calculation(Bazett) 448 ms    P Axis 47 degrees    R Axis -40 degrees    T Axis 58 degrees    Diagnosis Line Sinus tachycardia  Left axis deviation  Anterior infarct , age undetermined  Abnormal ECG    Confirmed by MARIA ESTHER PADILLA MD ([id number]) on 12/27/2022 8:07:53 AM (12-26-22 @ 15:31)  12 Lead ECG:   Ventricular Rate 115 BPM    Atrial Rate 115 BPM    P-R Interval 148 ms    QRS Duration 72 ms    Q-T Interval 312 ms    QTC Calculation(Bazett) 431 ms    P Axis 48 degrees    R Axis -56 degrees    T Axis 72 degrees    Diagnosis Line Sinus tachycardia  Left axis deviation  Possible Anteroseptal infarct    Confirmed by MARIA ESTHER PADILLA MD ([id number]) on 12/27/2022 8:07:40 AM (12-26-22 @ 14:25)      MEDICATIONS  amLODIPine   Tablet 2.5 Oral daily  atorvastatin 10 Oral at bedtime  azithromycin  IVPB 500 IV Intermittent every 24 hours  cefTRIAXone   IVPB 1000 IV Intermittent every 24 hours  enoxaparin Injectable 40 SubCutaneous every 24 hours  levothyroxine 25 Oral daily      Weight  Weight (kg): 85.5 (12-26-22 @ 21:42)    ANTIBIOTICS:  azithromycin  IVPB 500 milliGRAM(s) IV Intermittent every 24 hours  cefTRIAXone   IVPB 1000 milliGRAM(s) IV Intermittent every 24 hours      ALLERGIES:  Compazine (Unknown)

## 2022-12-27 NOTE — CHART NOTE - NSCHARTNOTEFT_GEN_A_CORE
unable to place IV line  at bed side .  Pt will need IV with US.  Plan  change zithromax  to PO until  IV line replaced.

## 2022-12-28 LAB
ANION GAP SERPL CALC-SCNC: 15 MMOL/L — HIGH (ref 7–14)
APPEARANCE UR: CLEAR — SIGNIFICANT CHANGE UP
BACTERIA # UR AUTO: ABNORMAL
BASOPHILS # BLD AUTO: 0.02 K/UL — SIGNIFICANT CHANGE UP (ref 0–0.2)
BASOPHILS NFR BLD AUTO: 0.2 % — SIGNIFICANT CHANGE UP (ref 0–1)
BILIRUB UR-MCNC: NEGATIVE — SIGNIFICANT CHANGE UP
BUN SERPL-MCNC: 18 MG/DL — SIGNIFICANT CHANGE UP (ref 10–20)
CALCIUM SERPL-MCNC: 9.3 MG/DL — SIGNIFICANT CHANGE UP (ref 8.4–10.5)
CHLORIDE SERPL-SCNC: 100 MMOL/L — SIGNIFICANT CHANGE UP (ref 98–110)
CO2 SERPL-SCNC: 23 MMOL/L — SIGNIFICANT CHANGE UP (ref 17–32)
COLOR SPEC: YELLOW — SIGNIFICANT CHANGE UP
COMMENT - URINE: SIGNIFICANT CHANGE UP
CREAT SERPL-MCNC: 0.8 MG/DL — SIGNIFICANT CHANGE UP (ref 0.7–1.5)
DIFF PNL FLD: ABNORMAL
EGFR: 74 ML/MIN/1.73M2 — SIGNIFICANT CHANGE UP
EOSINOPHIL # BLD AUTO: 0.01 K/UL — SIGNIFICANT CHANGE UP (ref 0–0.7)
EOSINOPHIL NFR BLD AUTO: 0.1 % — SIGNIFICANT CHANGE UP (ref 0–8)
EPI CELLS # UR: ABNORMAL /HPF
GLUCOSE SERPL-MCNC: 226 MG/DL — HIGH (ref 70–99)
GLUCOSE UR QL: NEGATIVE MG/DL — SIGNIFICANT CHANGE UP
HCT VFR BLD CALC: 40.7 % — SIGNIFICANT CHANGE UP (ref 37–47)
HGB BLD-MCNC: 12.8 G/DL — SIGNIFICANT CHANGE UP (ref 12–16)
IMM GRANULOCYTES NFR BLD AUTO: 0.6 % — HIGH (ref 0.1–0.3)
KETONES UR-MCNC: 40
LEUKOCYTE ESTERASE UR-ACNC: NEGATIVE — SIGNIFICANT CHANGE UP
LYMPHOCYTES # BLD AUTO: 0.6 K/UL — LOW (ref 1.2–3.4)
LYMPHOCYTES # BLD AUTO: 6.7 % — LOW (ref 20.5–51.1)
MCHC RBC-ENTMCNC: 30.3 PG — SIGNIFICANT CHANGE UP (ref 27–31)
MCHC RBC-ENTMCNC: 31.4 G/DL — LOW (ref 32–37)
MCV RBC AUTO: 96.4 FL — SIGNIFICANT CHANGE UP (ref 81–99)
MONOCYTES # BLD AUTO: 0.06 K/UL — LOW (ref 0.1–0.6)
MONOCYTES NFR BLD AUTO: 0.7 % — LOW (ref 1.7–9.3)
NEUTROPHILS # BLD AUTO: 8.15 K/UL — HIGH (ref 1.4–6.5)
NEUTROPHILS NFR BLD AUTO: 91.7 % — HIGH (ref 42.2–75.2)
NITRITE UR-MCNC: NEGATIVE — SIGNIFICANT CHANGE UP
NRBC # BLD: 0 /100 WBCS — SIGNIFICANT CHANGE UP (ref 0–0)
PH UR: 6 — SIGNIFICANT CHANGE UP (ref 5–8)
PLATELET # BLD AUTO: 216 K/UL — SIGNIFICANT CHANGE UP (ref 130–400)
POTASSIUM SERPL-MCNC: 5.1 MMOL/L — HIGH (ref 3.5–5)
POTASSIUM SERPL-SCNC: 5.1 MMOL/L — HIGH (ref 3.5–5)
PROCALCITONIN SERPL-MCNC: 0.1 NG/ML — SIGNIFICANT CHANGE UP (ref 0.02–0.1)
PROT UR-MCNC: 30 MG/DL
RBC # BLD: 4.22 M/UL — SIGNIFICANT CHANGE UP (ref 4.2–5.4)
RBC # FLD: 12.9 % — SIGNIFICANT CHANGE UP (ref 11.5–14.5)
RBC CASTS # UR COMP ASSIST: ABNORMAL /HPF
SODIUM SERPL-SCNC: 138 MMOL/L — SIGNIFICANT CHANGE UP (ref 135–146)
SP GR SPEC: >=1.03 (ref 1.01–1.03)
UROBILINOGEN FLD QL: 0.2 MG/DL — SIGNIFICANT CHANGE UP
WBC # BLD: 8.89 K/UL — SIGNIFICANT CHANGE UP (ref 4.8–10.8)
WBC # FLD AUTO: 8.89 K/UL — SIGNIFICANT CHANGE UP (ref 4.8–10.8)
WBC UR QL: SIGNIFICANT CHANGE UP /HPF

## 2022-12-28 PROCEDURE — 99232 SBSQ HOSP IP/OBS MODERATE 35: CPT

## 2022-12-28 PROCEDURE — 72128 CT CHEST SPINE W/O DYE: CPT | Mod: 26

## 2022-12-28 PROCEDURE — 72125 CT NECK SPINE W/O DYE: CPT | Mod: 26

## 2022-12-28 RX ORDER — ZOLPIDEM TARTRATE 10 MG/1
5 TABLET ORAL AT BEDTIME
Refills: 0 | Status: DISCONTINUED | OUTPATIENT
Start: 2022-12-28 | End: 2022-12-28

## 2022-12-28 RX ADMIN — MORPHINE SULFATE 4 MILLIGRAM(S): 50 CAPSULE, EXTENDED RELEASE ORAL at 18:54

## 2022-12-28 RX ADMIN — MORPHINE SULFATE 4 MILLIGRAM(S): 50 CAPSULE, EXTENDED RELEASE ORAL at 22:54

## 2022-12-28 RX ADMIN — AMLODIPINE BESYLATE 2.5 MILLIGRAM(S): 2.5 TABLET ORAL at 05:18

## 2022-12-28 RX ADMIN — Medication 40 MILLIGRAM(S): at 05:22

## 2022-12-28 RX ADMIN — ENOXAPARIN SODIUM 40 MILLIGRAM(S): 100 INJECTION SUBCUTANEOUS at 05:17

## 2022-12-28 RX ADMIN — MORPHINE SULFATE 4 MILLIGRAM(S): 50 CAPSULE, EXTENDED RELEASE ORAL at 13:59

## 2022-12-28 RX ADMIN — AZITHROMYCIN 255 MILLIGRAM(S): 500 TABLET, FILM COATED ORAL at 00:10

## 2022-12-28 RX ADMIN — Medication 25 MICROGRAM(S): at 05:17

## 2022-12-28 RX ADMIN — ZOLPIDEM TARTRATE 5 MILLIGRAM(S): 10 TABLET ORAL at 01:48

## 2022-12-28 RX ADMIN — MORPHINE SULFATE 4 MILLIGRAM(S): 50 CAPSULE, EXTENDED RELEASE ORAL at 00:11

## 2022-12-28 RX ADMIN — Medication 100 MILLIGRAM(S): at 18:56

## 2022-12-28 RX ADMIN — ZOLPIDEM TARTRATE 5 MILLIGRAM(S): 10 TABLET ORAL at 22:06

## 2022-12-28 RX ADMIN — MORPHINE SULFATE 4 MILLIGRAM(S): 50 CAPSULE, EXTENDED RELEASE ORAL at 15:31

## 2022-12-28 RX ADMIN — ATORVASTATIN CALCIUM 10 MILLIGRAM(S): 80 TABLET, FILM COATED ORAL at 21:24

## 2022-12-29 LAB
ALBUMIN SERPL ELPH-MCNC: 4.3 G/DL — SIGNIFICANT CHANGE UP (ref 3.5–5.2)
ALP SERPL-CCNC: 96 U/L — SIGNIFICANT CHANGE UP (ref 30–115)
ALT FLD-CCNC: 21 U/L — SIGNIFICANT CHANGE UP (ref 0–41)
ANION GAP SERPL CALC-SCNC: 12 MMOL/L — SIGNIFICANT CHANGE UP (ref 7–14)
AST SERPL-CCNC: 22 U/L — SIGNIFICANT CHANGE UP (ref 0–41)
BASOPHILS # BLD AUTO: 0.03 K/UL — SIGNIFICANT CHANGE UP (ref 0–0.2)
BASOPHILS NFR BLD AUTO: 0.2 % — SIGNIFICANT CHANGE UP (ref 0–1)
BILIRUB SERPL-MCNC: 0.6 MG/DL — SIGNIFICANT CHANGE UP (ref 0.2–1.2)
BUN SERPL-MCNC: 24 MG/DL — HIGH (ref 10–20)
CALCIUM SERPL-MCNC: 9.4 MG/DL — SIGNIFICANT CHANGE UP (ref 8.4–10.5)
CHLORIDE SERPL-SCNC: 102 MMOL/L — SIGNIFICANT CHANGE UP (ref 98–110)
CO2 SERPL-SCNC: 25 MMOL/L — SIGNIFICANT CHANGE UP (ref 17–32)
CREAT SERPL-MCNC: 1 MG/DL — SIGNIFICANT CHANGE UP (ref 0.7–1.5)
CULTURE RESULTS: SIGNIFICANT CHANGE UP
EGFR: 57 ML/MIN/1.73M2 — LOW
EOSINOPHIL # BLD AUTO: 0 K/UL — SIGNIFICANT CHANGE UP (ref 0–0.7)
EOSINOPHIL NFR BLD AUTO: 0 % — SIGNIFICANT CHANGE UP (ref 0–8)
GLUCOSE SERPL-MCNC: 175 MG/DL — HIGH (ref 70–99)
HCT VFR BLD CALC: 38.7 % — SIGNIFICANT CHANGE UP (ref 37–47)
HGB BLD-MCNC: 12.3 G/DL — SIGNIFICANT CHANGE UP (ref 12–16)
IMM GRANULOCYTES NFR BLD AUTO: 0.6 % — HIGH (ref 0.1–0.3)
LEGIONELLA AG UR QL: NEGATIVE — SIGNIFICANT CHANGE UP
LYMPHOCYTES # BLD AUTO: 0.62 K/UL — LOW (ref 1.2–3.4)
LYMPHOCYTES # BLD AUTO: 4 % — LOW (ref 20.5–51.1)
MCHC RBC-ENTMCNC: 30.4 PG — SIGNIFICANT CHANGE UP (ref 27–31)
MCHC RBC-ENTMCNC: 31.8 G/DL — LOW (ref 32–37)
MCV RBC AUTO: 95.6 FL — SIGNIFICANT CHANGE UP (ref 81–99)
MONOCYTES # BLD AUTO: 0.92 K/UL — HIGH (ref 0.1–0.6)
MONOCYTES NFR BLD AUTO: 5.9 % — SIGNIFICANT CHANGE UP (ref 1.7–9.3)
MRSA PCR RESULT.: NEGATIVE — SIGNIFICANT CHANGE UP
NEUTROPHILS # BLD AUTO: 13.8 K/UL — HIGH (ref 1.4–6.5)
NEUTROPHILS NFR BLD AUTO: 89.3 % — HIGH (ref 42.2–75.2)
NRBC # BLD: 0 /100 WBCS — SIGNIFICANT CHANGE UP (ref 0–0)
PLATELET # BLD AUTO: 255 K/UL — SIGNIFICANT CHANGE UP (ref 130–400)
POTASSIUM SERPL-MCNC: 4.2 MMOL/L — SIGNIFICANT CHANGE UP (ref 3.5–5)
POTASSIUM SERPL-SCNC: 4.2 MMOL/L — SIGNIFICANT CHANGE UP (ref 3.5–5)
PROT SERPL-MCNC: 6.3 G/DL — SIGNIFICANT CHANGE UP (ref 6–8)
RAPID RVP RESULT: SIGNIFICANT CHANGE UP
RBC # BLD: 4.05 M/UL — LOW (ref 4.2–5.4)
RBC # FLD: 13.3 % — SIGNIFICANT CHANGE UP (ref 11.5–14.5)
SARS-COV-2 RNA SPEC QL NAA+PROBE: SIGNIFICANT CHANGE UP
SODIUM SERPL-SCNC: 139 MMOL/L — SIGNIFICANT CHANGE UP (ref 135–146)
SPECIMEN SOURCE: SIGNIFICANT CHANGE UP
WBC # BLD: 15.47 K/UL — HIGH (ref 4.8–10.8)
WBC # FLD AUTO: 15.47 K/UL — HIGH (ref 4.8–10.8)

## 2022-12-29 PROCEDURE — 93010 ELECTROCARDIOGRAM REPORT: CPT

## 2022-12-29 PROCEDURE — 99232 SBSQ HOSP IP/OBS MODERATE 35: CPT

## 2022-12-29 RX ADMIN — Medication 25 MICROGRAM(S): at 05:24

## 2022-12-29 RX ADMIN — ZOLPIDEM TARTRATE 5 MILLIGRAM(S): 10 TABLET ORAL at 21:53

## 2022-12-29 RX ADMIN — AMLODIPINE BESYLATE 2.5 MILLIGRAM(S): 2.5 TABLET ORAL at 05:24

## 2022-12-29 RX ADMIN — MORPHINE SULFATE 4 MILLIGRAM(S): 50 CAPSULE, EXTENDED RELEASE ORAL at 21:53

## 2022-12-29 RX ADMIN — MORPHINE SULFATE 4 MILLIGRAM(S): 50 CAPSULE, EXTENDED RELEASE ORAL at 17:41

## 2022-12-29 RX ADMIN — ENOXAPARIN SODIUM 40 MILLIGRAM(S): 100 INJECTION SUBCUTANEOUS at 05:26

## 2022-12-29 RX ADMIN — Medication 100 MILLIGRAM(S): at 21:49

## 2022-12-29 RX ADMIN — Medication 3 MILLILITER(S): at 07:45

## 2022-12-29 RX ADMIN — MORPHINE SULFATE 4 MILLIGRAM(S): 50 CAPSULE, EXTENDED RELEASE ORAL at 22:40

## 2022-12-29 RX ADMIN — Medication 60 MILLIGRAM(S): at 05:25

## 2022-12-29 RX ADMIN — MORPHINE SULFATE 4 MILLIGRAM(S): 50 CAPSULE, EXTENDED RELEASE ORAL at 05:24

## 2022-12-29 RX ADMIN — MORPHINE SULFATE 4 MILLIGRAM(S): 50 CAPSULE, EXTENDED RELEASE ORAL at 06:28

## 2022-12-29 RX ADMIN — ATORVASTATIN CALCIUM 10 MILLIGRAM(S): 80 TABLET, FILM COATED ORAL at 21:49

## 2022-12-30 LAB
ALBUMIN SERPL ELPH-MCNC: 4 G/DL — SIGNIFICANT CHANGE UP (ref 3.5–5.2)
ALP SERPL-CCNC: 85 U/L — SIGNIFICANT CHANGE UP (ref 30–115)
ALT FLD-CCNC: 22 U/L — SIGNIFICANT CHANGE UP (ref 0–41)
ANION GAP SERPL CALC-SCNC: 16 MMOL/L — HIGH (ref 7–14)
AST SERPL-CCNC: 26 U/L — SIGNIFICANT CHANGE UP (ref 0–41)
BASOPHILS # BLD AUTO: 0.04 K/UL — SIGNIFICANT CHANGE UP (ref 0–0.2)
BASOPHILS NFR BLD AUTO: 0.3 % — SIGNIFICANT CHANGE UP (ref 0–1)
BILIRUB SERPL-MCNC: 0.7 MG/DL — SIGNIFICANT CHANGE UP (ref 0.2–1.2)
BUN SERPL-MCNC: 16 MG/DL — SIGNIFICANT CHANGE UP (ref 10–20)
CALCIUM SERPL-MCNC: 9.2 MG/DL — SIGNIFICANT CHANGE UP (ref 8.4–10.5)
CHLORIDE SERPL-SCNC: 101 MMOL/L — SIGNIFICANT CHANGE UP (ref 98–110)
CO2 SERPL-SCNC: 22 MMOL/L — SIGNIFICANT CHANGE UP (ref 17–32)
CREAT SERPL-MCNC: 0.8 MG/DL — SIGNIFICANT CHANGE UP (ref 0.7–1.5)
EGFR: 74 ML/MIN/1.73M2 — SIGNIFICANT CHANGE UP
EOSINOPHIL # BLD AUTO: 0.01 K/UL — SIGNIFICANT CHANGE UP (ref 0–0.7)
EOSINOPHIL NFR BLD AUTO: 0.1 % — SIGNIFICANT CHANGE UP (ref 0–8)
GLUCOSE SERPL-MCNC: 76 MG/DL — SIGNIFICANT CHANGE UP (ref 70–99)
HCT VFR BLD CALC: 38.2 % — SIGNIFICANT CHANGE UP (ref 37–47)
HGB BLD-MCNC: 12.2 G/DL — SIGNIFICANT CHANGE UP (ref 12–16)
IMM GRANULOCYTES NFR BLD AUTO: 0.9 % — HIGH (ref 0.1–0.3)
LYMPHOCYTES # BLD AUTO: 1.91 K/UL — SIGNIFICANT CHANGE UP (ref 1.2–3.4)
LYMPHOCYTES # BLD AUTO: 13.8 % — LOW (ref 20.5–51.1)
MCHC RBC-ENTMCNC: 30.6 PG — SIGNIFICANT CHANGE UP (ref 27–31)
MCHC RBC-ENTMCNC: 31.9 G/DL — LOW (ref 32–37)
MCV RBC AUTO: 95.7 FL — SIGNIFICANT CHANGE UP (ref 81–99)
MONOCYTES # BLD AUTO: 1.59 K/UL — HIGH (ref 0.1–0.6)
MONOCYTES NFR BLD AUTO: 11.5 % — HIGH (ref 1.7–9.3)
NEUTROPHILS # BLD AUTO: 10.17 K/UL — HIGH (ref 1.4–6.5)
NEUTROPHILS NFR BLD AUTO: 73.4 % — SIGNIFICANT CHANGE UP (ref 42.2–75.2)
NRBC # BLD: 0 /100 WBCS — SIGNIFICANT CHANGE UP (ref 0–0)
PLATELET # BLD AUTO: 216 K/UL — SIGNIFICANT CHANGE UP (ref 130–400)
POTASSIUM SERPL-MCNC: 4.1 MMOL/L — SIGNIFICANT CHANGE UP (ref 3.5–5)
POTASSIUM SERPL-SCNC: 4.1 MMOL/L — SIGNIFICANT CHANGE UP (ref 3.5–5)
PROT SERPL-MCNC: 5.9 G/DL — LOW (ref 6–8)
RBC # BLD: 3.99 M/UL — LOW (ref 4.2–5.4)
RBC # FLD: 13.2 % — SIGNIFICANT CHANGE UP (ref 11.5–14.5)
SODIUM SERPL-SCNC: 139 MMOL/L — SIGNIFICANT CHANGE UP (ref 135–146)
WBC # BLD: 13.85 K/UL — HIGH (ref 4.8–10.8)
WBC # FLD AUTO: 13.85 K/UL — HIGH (ref 4.8–10.8)

## 2022-12-30 PROCEDURE — 99221 1ST HOSP IP/OBS SF/LOW 40: CPT

## 2022-12-30 PROCEDURE — 99232 SBSQ HOSP IP/OBS MODERATE 35: CPT

## 2022-12-30 PROCEDURE — 72146 MRI CHEST SPINE W/O DYE: CPT | Mod: 26

## 2022-12-30 RX ORDER — GUAIFENESIN/DEXTROMETHORPHAN 600MG-30MG
10 TABLET, EXTENDED RELEASE 12 HR ORAL EVERY 6 HOURS
Refills: 0 | Status: DISCONTINUED | OUTPATIENT
Start: 2022-12-30 | End: 2023-01-14

## 2022-12-30 RX ADMIN — Medication 100 MILLIGRAM(S): at 06:39

## 2022-12-30 RX ADMIN — AMLODIPINE BESYLATE 2.5 MILLIGRAM(S): 2.5 TABLET ORAL at 06:35

## 2022-12-30 RX ADMIN — ATORVASTATIN CALCIUM 10 MILLIGRAM(S): 80 TABLET, FILM COATED ORAL at 21:28

## 2022-12-30 RX ADMIN — TRAMADOL HYDROCHLORIDE 50 MILLIGRAM(S): 50 TABLET ORAL at 06:36

## 2022-12-30 RX ADMIN — Medication 60 MILLIGRAM(S): at 06:36

## 2022-12-30 RX ADMIN — MORPHINE SULFATE 4 MILLIGRAM(S): 50 CAPSULE, EXTENDED RELEASE ORAL at 14:59

## 2022-12-30 RX ADMIN — MORPHINE SULFATE 4 MILLIGRAM(S): 50 CAPSULE, EXTENDED RELEASE ORAL at 10:19

## 2022-12-30 RX ADMIN — Medication 25 MICROGRAM(S): at 06:36

## 2022-12-30 NOTE — CONSULT NOTE ADULT - SUBJECTIVE AND OBJECTIVE BOX
HISTORY OF PRESENT ILLNESS:     82y F pmhx of DLD, hypothyroid, chronic back pain, hx cerebral aneurysm s/p coiling (2010) p/w upper back pain and SOB. Pt states that for months she has been having SOB on exertion, she had a cardiac work up with no clear explantation for her dyspnea. She states it has progressively worsened, today she is dyspneic while talking, sitting up in stretcher. C/o back pain. Denies fever, chills, cp, cough, n/v/d, dysuria.  CT chest negative for PE, found to have b/l opacities, atypical/viral infection versus chronic interstitial lung disease.     pt seen and examined pt with c/o back discomfort no radicular pain     PAST MEDICAL & SURGICAL HISTORY:  Hypothyroid      Hyperlipemia      Brain aneurysm      S/P appendectomy      S/P coil embolization of cerebral aneurysm        FAMILY HISTORY:      SOCIAL HISTORY:  Tobacco Use:  EtOH use:   Substance:    Allergies    Compazine (Unknown)    Intolerances        REVIEW OF SYSTEMS  	   Constitutional: (-) fever (-) chills   Eyes: (-) visual changes  ENMT: (-) nasal or chest congestion (-) runny nose (-) sore throat (-) neck pain (-) neck stiffness  Cardiac: (-) chest pain (-) syncope  Respiratory: (-) cough (-) SOB  GI: (-) nausea (-) vomiting (-) diarrhea  : (-) incontinence  MS:(-) back pain   Neuro: (-) head injury (-) headache (-) dizziness (-) numbness/tingling to extremities B/L (-) LOC   Skin: (-) abrasion (-) rash (-) laceration  Except as documented in the HPI, all other systems are negative.      MEDICATIONS:  Antibiotics:    Neuro:  acetaminophen     Tablet .. 650 milliGRAM(s) Oral every 6 hours PRN  LORazepam   Injectable 2 milliGRAM(s) IV Push once PRN  morphine  - Injectable 4 milliGRAM(s) IV Push every 4 hours PRN  traMADol 50 milliGRAM(s) Oral every 4 hours PRN  zolpidem 5 milliGRAM(s) Oral at bedtime PRN  zolpidem 5 milliGRAM(s) Oral at bedtime PRN    Anticoagulation:  enoxaparin Injectable 40 milliGRAM(s) SubCutaneous every 24 hours    OTHER:  albuterol    90 MICROgram(s) HFA Inhaler 1 Puff(s) Inhalation every 6 hours PRN  albuterol/ipratropium for Nebulization 3 milliLiter(s) Nebulizer every 6 hours PRN  amLODIPine   Tablet 2.5 milliGRAM(s) Oral daily  atorvastatin 10 milliGRAM(s) Oral at bedtime  benzonatate 100 milliGRAM(s) Oral three times a day PRN  levothyroxine 25 MICROGram(s) Oral daily  predniSONE   Tablet 60 milliGRAM(s) Oral daily    IVF:      Vital Signs Last 24 Hrs  T(C): 35.9 (29 Dec 2022 21:09), Max: 35.9 (29 Dec 2022 21:09)  T(F): 96.6 (29 Dec 2022 21:09), Max: 96.6 (29 Dec 2022 21:09)  HR: 104 (30 Dec 2022 06:18) (104 - 118)  BP: 143/83 (30 Dec 2022 06:18) (143/83 - 143/94)  BP(mean): --  RR: 24 (30 Dec 2022 06:18) (18 - 24)  SpO2: 96% (30 Dec 2022 06:18) (94% - 96%)    Parameters below as of 30 Dec 2022 06:18  Patient On (Oxygen Delivery Method): nasal cannula  O2 Flow (L/min): 4      PHYSICAL EXAM:    Alert, follows commands  A&OX3, PERRL   EOM ( I )   MAEX4   Sensory intact   Back slight tenderness to palpation     LABS:                        12.2   13.85 )-----------( 216      ( 30 Dec 2022 05:47 )             38.2     12-30    139  |  101  |  16  ----------------------------<  76  4.1   |  22  |  0.8    Ca    9.2      30 Dec 2022 05:47    TPro  5.9<L>  /  Alb  4.0  /  TBili  0.7  /  DBili  x   /  AST  26  /  ALT  22  /  AlkPhos  85  12-30        CULTURES:  Culture Results:   No growth (12-28 @ 11:03)  Culture Results:   No growth to date. (12-26 @ 16:00)      RADIOLOGY & ADDITIONAL STUDIES:        A/p        82 yr old female with back pain        T12 compression fx        will follow up MRI of Lspine        conservative therapy for now

## 2022-12-30 NOTE — CHART NOTE - NSCHARTNOTEFT_GEN_A_CORE
Was alerted by nurse pt was found with her oxygen nasal cannula off of her face, pt desaturated and was lethargic. Pt was seen and examined at bedside now awake and alert back to her baseline speaking full sentences after her oxygen was placed back, pt's O2sat is 97%, Pt denies SOB at this time. continue to monitor patient. Was alerted by nurse pt was found with her oxygen nasal cannula off of her face, pt desaturated down to ~70's and was lethargic. Pt was seen and examined at bedside now awake and alert back to her baseline speaking full sentences after her oxygen was placed back, pt's O2sat is 97% on 4L NC, Pt denies SOB at this time. continue to monitor patient. Was alerted by nurse pt was found with her oxygen nasal cannula off of her face, pt desaturated down to ~70's and was lethargic. Pt was seen and examined at bedside now awake and alert back to her baseline speaking full sentences after her oxygen was placed back, pt's O2sat is 97% on 4L NC, Pt refused ABG. Pt denies SOB at this time. continue to monitor patient.

## 2022-12-31 LAB
ALBUMIN SERPL ELPH-MCNC: 3.7 G/DL — SIGNIFICANT CHANGE UP (ref 3.5–5.2)
ALP SERPL-CCNC: 78 U/L — SIGNIFICANT CHANGE UP (ref 30–115)
ALT FLD-CCNC: 18 U/L — SIGNIFICANT CHANGE UP (ref 0–41)
ANION GAP SERPL CALC-SCNC: 8 MMOL/L — SIGNIFICANT CHANGE UP (ref 7–14)
AST SERPL-CCNC: 15 U/L — SIGNIFICANT CHANGE UP (ref 0–41)
BASOPHILS # BLD AUTO: 0.02 K/UL — SIGNIFICANT CHANGE UP (ref 0–0.2)
BASOPHILS NFR BLD AUTO: 0.2 % — SIGNIFICANT CHANGE UP (ref 0–1)
BILIRUB SERPL-MCNC: 0.4 MG/DL — SIGNIFICANT CHANGE UP (ref 0.2–1.2)
BUN SERPL-MCNC: 15 MG/DL — SIGNIFICANT CHANGE UP (ref 10–20)
CALCIUM SERPL-MCNC: 8.5 MG/DL — SIGNIFICANT CHANGE UP (ref 8.4–10.5)
CHLORIDE SERPL-SCNC: 104 MMOL/L — SIGNIFICANT CHANGE UP (ref 98–110)
CO2 SERPL-SCNC: 28 MMOL/L — SIGNIFICANT CHANGE UP (ref 17–32)
CREAT SERPL-MCNC: 0.6 MG/DL — LOW (ref 0.7–1.5)
CULTURE RESULTS: SIGNIFICANT CHANGE UP
CULTURE RESULTS: SIGNIFICANT CHANGE UP
EGFR: 91 ML/MIN/1.73M2 — SIGNIFICANT CHANGE UP
EOSINOPHIL # BLD AUTO: 0 K/UL — SIGNIFICANT CHANGE UP (ref 0–0.7)
EOSINOPHIL NFR BLD AUTO: 0 % — SIGNIFICANT CHANGE UP (ref 0–8)
GLUCOSE SERPL-MCNC: 95 MG/DL — SIGNIFICANT CHANGE UP (ref 70–99)
HCT VFR BLD CALC: 33.9 % — LOW (ref 37–47)
HGB BLD-MCNC: 10.6 G/DL — LOW (ref 12–16)
IMM GRANULOCYTES NFR BLD AUTO: 0.8 % — HIGH (ref 0.1–0.3)
LYMPHOCYTES # BLD AUTO: 1.13 K/UL — LOW (ref 1.2–3.4)
LYMPHOCYTES # BLD AUTO: 10.9 % — LOW (ref 20.5–51.1)
MCHC RBC-ENTMCNC: 30.4 PG — SIGNIFICANT CHANGE UP (ref 27–31)
MCHC RBC-ENTMCNC: 31.3 G/DL — LOW (ref 32–37)
MCV RBC AUTO: 97.1 FL — SIGNIFICANT CHANGE UP (ref 81–99)
MONOCYTES # BLD AUTO: 1.22 K/UL — HIGH (ref 0.1–0.6)
MONOCYTES NFR BLD AUTO: 11.8 % — HIGH (ref 1.7–9.3)
NEUTROPHILS # BLD AUTO: 7.87 K/UL — HIGH (ref 1.4–6.5)
NEUTROPHILS NFR BLD AUTO: 76.3 % — HIGH (ref 42.2–75.2)
NRBC # BLD: 0 /100 WBCS — SIGNIFICANT CHANGE UP (ref 0–0)
PLATELET # BLD AUTO: 213 K/UL — SIGNIFICANT CHANGE UP (ref 130–400)
POTASSIUM SERPL-MCNC: 3.6 MMOL/L — SIGNIFICANT CHANGE UP (ref 3.5–5)
POTASSIUM SERPL-SCNC: 3.6 MMOL/L — SIGNIFICANT CHANGE UP (ref 3.5–5)
PROT SERPL-MCNC: 5.2 G/DL — LOW (ref 6–8)
RBC # BLD: 3.49 M/UL — LOW (ref 4.2–5.4)
RBC # FLD: 13.3 % — SIGNIFICANT CHANGE UP (ref 11.5–14.5)
SODIUM SERPL-SCNC: 140 MMOL/L — SIGNIFICANT CHANGE UP (ref 135–146)
SPECIMEN SOURCE: SIGNIFICANT CHANGE UP
SPECIMEN SOURCE: SIGNIFICANT CHANGE UP
WBC # BLD: 10.32 K/UL — SIGNIFICANT CHANGE UP (ref 4.8–10.8)
WBC # FLD AUTO: 10.32 K/UL — SIGNIFICANT CHANGE UP (ref 4.8–10.8)

## 2022-12-31 PROCEDURE — 99233 SBSQ HOSP IP/OBS HIGH 50: CPT

## 2022-12-31 RX ORDER — SENNA PLUS 8.6 MG/1
2 TABLET ORAL AT BEDTIME
Refills: 0 | Status: DISCONTINUED | OUTPATIENT
Start: 2022-12-31 | End: 2023-01-14

## 2022-12-31 RX ORDER — POLYETHYLENE GLYCOL 3350 17 G/17G
17 POWDER, FOR SOLUTION ORAL DAILY
Refills: 0 | Status: DISCONTINUED | OUTPATIENT
Start: 2022-12-31 | End: 2023-01-14

## 2022-12-31 RX ADMIN — MORPHINE SULFATE 4 MILLIGRAM(S): 50 CAPSULE, EXTENDED RELEASE ORAL at 14:49

## 2022-12-31 RX ADMIN — MORPHINE SULFATE 4 MILLIGRAM(S): 50 CAPSULE, EXTENDED RELEASE ORAL at 01:10

## 2022-12-31 RX ADMIN — Medication 100 MILLIGRAM(S): at 23:26

## 2022-12-31 RX ADMIN — MORPHINE SULFATE 4 MILLIGRAM(S): 50 CAPSULE, EXTENDED RELEASE ORAL at 23:26

## 2022-12-31 RX ADMIN — MORPHINE SULFATE 4 MILLIGRAM(S): 50 CAPSULE, EXTENDED RELEASE ORAL at 05:38

## 2022-12-31 RX ADMIN — SENNA PLUS 2 TABLET(S): 8.6 TABLET ORAL at 21:27

## 2022-12-31 RX ADMIN — Medication 60 MILLIGRAM(S): at 05:31

## 2022-12-31 RX ADMIN — MORPHINE SULFATE 4 MILLIGRAM(S): 50 CAPSULE, EXTENDED RELEASE ORAL at 00:53

## 2022-12-31 RX ADMIN — Medication 5 MILLIGRAM(S): at 14:42

## 2022-12-31 RX ADMIN — Medication 100 MILLIGRAM(S): at 17:36

## 2022-12-31 RX ADMIN — AMLODIPINE BESYLATE 2.5 MILLIGRAM(S): 2.5 TABLET ORAL at 05:31

## 2022-12-31 RX ADMIN — Medication 25 MICROGRAM(S): at 05:31

## 2022-12-31 RX ADMIN — ATORVASTATIN CALCIUM 10 MILLIGRAM(S): 80 TABLET, FILM COATED ORAL at 21:27

## 2022-12-31 RX ADMIN — Medication 100 MILLIGRAM(S): at 00:34

## 2022-12-31 RX ADMIN — ENOXAPARIN SODIUM 40 MILLIGRAM(S): 100 INJECTION SUBCUTANEOUS at 05:30

## 2022-12-31 NOTE — CHART NOTE - NSCHARTNOTEFT_GEN_A_CORE
MRI reviewed with Dr Hwang , pt may participate with PT , pain management No acute Neurosurgical intervention at this time . FOllow up  in the office as an outpatient with Dr Hwang MRI reviewed with Dr Hwang , pt may participate with PT , pain management No acute Neurosurgical intervention at this time . Recommend PT/OT and 6-8 weeks outpatient PT.  Recommend inpatient/outpatient pain management follow-up.  Follow up  in the office as an outpatient with Dr Hwang after above conservative measures if persistent pain.

## 2022-12-31 NOTE — CONSULT NOTE ADULT - SUBJECTIVE AND OBJECTIVE BOX
WILFREDO CHO  MRN-[id number]      HISTORY OF PRESENT ILLNESS:  Patient is a 82y old  Female who presents with a chief complaint of cough, SOB x 6 months, no formal diagnosis of COPD (27 Dec 2022 16:57); she is known to my partner dr nahum barton; she is not on home oxygen; in past week prior to admission dyspnea and non productive cough worsened; no medication changes; no sick contacts; she also developed severe intractable back pain that worsened with cough; there was one fever of 101 on presentation to the hospital; she has been seen by ID, recommendations noted; she feels a little better since admission; she admits to anorexia but no weight loss        PMH/PSH:  PAST MEDICAL & SURGICAL HISTORY:  Hypothyroid      Hyperlipemia      Brain aneurysm      S/P appendectomy      S/P coil embolization of cerebral aneurysm        ALLERGIES:  Allergies    Compazine (Unknown)    Intolerances      SOCIAL HABITS:former remote smoker, less than 10 pack years  FAMILY HISTORY:   FAMILY HISTORY:      REVIEW OF SYSTEM:  Elements of review of systems are negative or non-applicable except as noted above in HPI section.       HOME MEDICATIONS:  ALBUTEROL HFA 90 MCG INHALER  AMLODIPINE BESYLATE 2.5 MG TAB  ATORVASTATIN 10 MG TABLET  LEVOTHYROXINE 25 MCG TABLET  PREDNISONE 20 MG TABLET    MEDICATIONS:  MEDICATIONS  (STANDING):  amLODIPine   Tablet 2.5 milliGRAM(s) Oral daily  atorvastatin 10 milliGRAM(s) Oral at bedtime  enoxaparin Injectable 40 milliGRAM(s) SubCutaneous every 24 hours  levothyroxine 25 MICROGram(s) Oral daily  predniSONE   Tablet 60 milliGRAM(s) Oral daily    MEDICATIONS  (PRN):  acetaminophen     Tablet .. 650 milliGRAM(s) Oral every 6 hours PRN Temp greater or equal to 38C (100.4F), Mild Pain (1 - 3)  albuterol    90 MICROgram(s) HFA Inhaler 1 Puff(s) Inhalation every 6 hours PRN Shortness of Breath  albuterol/ipratropium for Nebulization 3 milliLiter(s) Nebulizer every 6 hours PRN Shortness of Breath  benzonatate 100 milliGRAM(s) Oral three times a day PRN Cough  guaifenesin/dextromethorphan Oral Liquid 10 milliLiter(s) Oral every 6 hours PRN Cough  LORazepam   Injectable 2 milliGRAM(s) IV Push once PRN Anxiety  morphine  - Injectable 4 milliGRAM(s) IV Push every 4 hours PRN Severe Pain (7 - 10)  traMADol 50 milliGRAM(s) Oral every 4 hours PRN Moderate Pain (4 - 6)  zolpidem 5 milliGRAM(s) Oral at bedtime PRN Insomnia  zolpidem 5 milliGRAM(s) Oral at bedtime PRN Insomnia        VITALS:   Vital Signs Last 24 Hrs  T(C): 36.2 (31 Dec 2022 05:11), Max: 36.7 (30 Dec 2022 21:44)  T(F): 97.1 (31 Dec 2022 05:11), Max: 98.1 (30 Dec 2022 21:44)  HR: 88 (31 Dec 2022 05:11) (88 - 99)  BP: 140/55 (31 Dec 2022 05:11) (129/73 - 140/55)  BP(mean): --  RR: 18 (31 Dec 2022 05:11) (18 - 18)  SpO2: 95% (31 Dec 2022 06:15) (95% - 95%)low flow oxygen    Parameters below as of 31 Dec 2022 06:15  Patient On (Oxygen Delivery Method): nasal cannula  O2 Flow (L/min): 3          PHYSICAL EXAM:    GENERAL: NAD, well-developed  HEAD:  Atraumatic, Normocephalic  NECK: Supple, No JVD  CHEST/LUNG: Clear to auscultation bilaterally; No wheeze  HEART: Regular rate and rhythm; No murmurs, rubs, or gallops  ABDOMEN: Soft, Nontender, Nondistended; Bowel sounds present  EXTREMITIES:  2+ Peripheral Pulses, No clubbing, cyanosis, or edema        LABS:                        12.2   13.85 )-----------( 216      ( 30 Dec 2022 05:47 )             38.2     12-30    139  |  101  |  16  ----------------------------<  76  4.1   |  22  |  0.8    Ca    9.2      30 Dec 2022 05:47    TPro  5.9<L>  /  Alb  4.0  /  TBili  0.7  /  DBili  x   /  AST  26  /  ALT  22  /  AlkPhos  85  12-30    LIVER FUNCTIONS - ( 30 Dec 2022 05:47 )  Alb: 4.0 g/dL / Pro: 5.9 g/dL / ALK PHOS: 85 U/L / ALT: 22 U/L / AST: 26 U/L / GGT: x                   Culture - Urine (collected 12-28-22 @ 11:03)  Source: Clean Catch Clean Catch (Midstream)  Final Report (12-29-22 @ 22:24):    No growth    Culture - Blood (collected 12-26-22 @ 16:00)  Source: .Blood Blood  Preliminary Report (12-27-22 @ 22:02):    No growth to date.    Culture - Blood (collected 12-26-22 @ 16:00)  Source: .Blood Blood  Preliminary Report (12-27-22 @ 22:02):    No growth to date.            ABG & VENT SETTINGS (when applicable)    n/a    DIAGNOSTIC STUDIES:    < from: Xray Chest 1 View- PORTABLE-Urgent (12.26.22 @ 15:55) >  mpression:    No radiographic evidence of acute cardiopulmonary disease.      < end of copied text >  < from: CT Angio Chest PE Protocol w/ IV Cont (12.26.22 @ 17:56) >  IMPRESSION:    No evidence of a pulmonary embolism.    Diffuse bilateral interstitial opacities noted. No focal consolidation   pleural effusion or pneumothorax. No bronchiectasis or honeycombing.   Findings may be on the basis of atypical/viral infection versus chronic   interstitial lung disease. Clinical correlation is advised. Recommend a   three-month follow-up chest CT, high resolution protocol.    7.0 x 7.0 cm right hepatic dome simple appearing cystic lesion. Recommend   a 12 month follow-up right upper quadrant ultrasound to assess for   stability.      < end of copied text >

## 2023-01-01 LAB
ALBUMIN SERPL ELPH-MCNC: 4.3 G/DL — SIGNIFICANT CHANGE UP (ref 3.5–5.2)
ALP SERPL-CCNC: 91 U/L — SIGNIFICANT CHANGE UP (ref 30–115)
ALT FLD-CCNC: 21 U/L — SIGNIFICANT CHANGE UP (ref 0–41)
ANION GAP SERPL CALC-SCNC: 11 MMOL/L — SIGNIFICANT CHANGE UP (ref 7–14)
AST SERPL-CCNC: 16 U/L — SIGNIFICANT CHANGE UP (ref 0–41)
BASOPHILS # BLD AUTO: 0.02 K/UL — SIGNIFICANT CHANGE UP (ref 0–0.2)
BASOPHILS NFR BLD AUTO: 0.1 % — SIGNIFICANT CHANGE UP (ref 0–1)
BILIRUB SERPL-MCNC: 0.6 MG/DL — SIGNIFICANT CHANGE UP (ref 0.2–1.2)
BUN SERPL-MCNC: 17 MG/DL — SIGNIFICANT CHANGE UP (ref 10–20)
CALCIUM SERPL-MCNC: 9.4 MG/DL — SIGNIFICANT CHANGE UP (ref 8.4–10.5)
CHLORIDE SERPL-SCNC: 100 MMOL/L — SIGNIFICANT CHANGE UP (ref 98–110)
CO2 SERPL-SCNC: 27 MMOL/L — SIGNIFICANT CHANGE UP (ref 17–32)
CREAT SERPL-MCNC: 0.8 MG/DL — SIGNIFICANT CHANGE UP (ref 0.7–1.5)
EGFR: 74 ML/MIN/1.73M2 — SIGNIFICANT CHANGE UP
EOSINOPHIL # BLD AUTO: 0 K/UL — SIGNIFICANT CHANGE UP (ref 0–0.7)
EOSINOPHIL NFR BLD AUTO: 0 % — SIGNIFICANT CHANGE UP (ref 0–8)
GLUCOSE SERPL-MCNC: 167 MG/DL — HIGH (ref 70–99)
HCT VFR BLD CALC: 40 % — SIGNIFICANT CHANGE UP (ref 37–47)
HGB BLD-MCNC: 12.5 G/DL — SIGNIFICANT CHANGE UP (ref 12–16)
IMM GRANULOCYTES NFR BLD AUTO: 1 % — HIGH (ref 0.1–0.3)
LYMPHOCYTES # BLD AUTO: 0.98 K/UL — LOW (ref 1.2–3.4)
LYMPHOCYTES # BLD AUTO: 6.8 % — LOW (ref 20.5–51.1)
MCHC RBC-ENTMCNC: 30.8 PG — SIGNIFICANT CHANGE UP (ref 27–31)
MCHC RBC-ENTMCNC: 31.3 G/DL — LOW (ref 32–37)
MCV RBC AUTO: 98.5 FL — SIGNIFICANT CHANGE UP (ref 81–99)
MONOCYTES # BLD AUTO: 0.54 K/UL — SIGNIFICANT CHANGE UP (ref 0.1–0.6)
MONOCYTES NFR BLD AUTO: 3.7 % — SIGNIFICANT CHANGE UP (ref 1.7–9.3)
NEUTROPHILS # BLD AUTO: 12.8 K/UL — HIGH (ref 1.4–6.5)
NEUTROPHILS NFR BLD AUTO: 88.4 % — HIGH (ref 42.2–75.2)
NRBC # BLD: 0 /100 WBCS — SIGNIFICANT CHANGE UP (ref 0–0)
PLATELET # BLD AUTO: 242 K/UL — SIGNIFICANT CHANGE UP (ref 130–400)
POTASSIUM SERPL-MCNC: 4.5 MMOL/L — SIGNIFICANT CHANGE UP (ref 3.5–5)
POTASSIUM SERPL-SCNC: 4.5 MMOL/L — SIGNIFICANT CHANGE UP (ref 3.5–5)
PROT SERPL-MCNC: 6 G/DL — SIGNIFICANT CHANGE UP (ref 6–8)
RBC # BLD: 4.06 M/UL — LOW (ref 4.2–5.4)
RBC # FLD: 13 % — SIGNIFICANT CHANGE UP (ref 11.5–14.5)
SODIUM SERPL-SCNC: 138 MMOL/L — SIGNIFICANT CHANGE UP (ref 135–146)
WBC # BLD: 14.48 K/UL — HIGH (ref 4.8–10.8)
WBC # FLD AUTO: 14.48 K/UL — HIGH (ref 4.8–10.8)

## 2023-01-01 PROCEDURE — 99232 SBSQ HOSP IP/OBS MODERATE 35: CPT

## 2023-01-01 PROCEDURE — 71045 X-RAY EXAM CHEST 1 VIEW: CPT | Mod: 26

## 2023-01-01 RX ADMIN — ENOXAPARIN SODIUM 40 MILLIGRAM(S): 100 INJECTION SUBCUTANEOUS at 05:22

## 2023-01-01 RX ADMIN — ATORVASTATIN CALCIUM 10 MILLIGRAM(S): 80 TABLET, FILM COATED ORAL at 21:34

## 2023-01-01 RX ADMIN — Medication 25 MICROGRAM(S): at 05:22

## 2023-01-01 RX ADMIN — POLYETHYLENE GLYCOL 3350 17 GRAM(S): 17 POWDER, FOR SOLUTION ORAL at 12:04

## 2023-01-01 RX ADMIN — MORPHINE SULFATE 4 MILLIGRAM(S): 50 CAPSULE, EXTENDED RELEASE ORAL at 00:30

## 2023-01-01 RX ADMIN — MORPHINE SULFATE 4 MILLIGRAM(S): 50 CAPSULE, EXTENDED RELEASE ORAL at 21:35

## 2023-01-01 RX ADMIN — MORPHINE SULFATE 4 MILLIGRAM(S): 50 CAPSULE, EXTENDED RELEASE ORAL at 05:21

## 2023-01-01 RX ADMIN — Medication 100 MILLIGRAM(S): at 05:22

## 2023-01-01 RX ADMIN — AMLODIPINE BESYLATE 2.5 MILLIGRAM(S): 2.5 TABLET ORAL at 05:23

## 2023-01-01 RX ADMIN — SENNA PLUS 2 TABLET(S): 8.6 TABLET ORAL at 21:34

## 2023-01-01 RX ADMIN — MORPHINE SULFATE 4 MILLIGRAM(S): 50 CAPSULE, EXTENDED RELEASE ORAL at 22:26

## 2023-01-01 RX ADMIN — Medication 60 MILLIGRAM(S): at 05:23

## 2023-01-01 RX ADMIN — MORPHINE SULFATE 4 MILLIGRAM(S): 50 CAPSULE, EXTENDED RELEASE ORAL at 14:59

## 2023-01-01 RX ADMIN — Medication 100 MILLIGRAM(S): at 21:34

## 2023-01-02 LAB
ALBUMIN SERPL ELPH-MCNC: 4.1 G/DL — SIGNIFICANT CHANGE UP (ref 3.5–5.2)
ALP SERPL-CCNC: 90 U/L — SIGNIFICANT CHANGE UP (ref 30–115)
ALT FLD-CCNC: 21 U/L — SIGNIFICANT CHANGE UP (ref 0–41)
ANION GAP SERPL CALC-SCNC: 11 MMOL/L — SIGNIFICANT CHANGE UP (ref 7–14)
AST SERPL-CCNC: 17 U/L — SIGNIFICANT CHANGE UP (ref 0–41)
BASOPHILS # BLD AUTO: 0.04 K/UL — SIGNIFICANT CHANGE UP (ref 0–0.2)
BASOPHILS NFR BLD AUTO: 0.3 % — SIGNIFICANT CHANGE UP (ref 0–1)
BILIRUB SERPL-MCNC: 0.6 MG/DL — SIGNIFICANT CHANGE UP (ref 0.2–1.2)
BUN SERPL-MCNC: 18 MG/DL — SIGNIFICANT CHANGE UP (ref 10–20)
CALCIUM SERPL-MCNC: 9.2 MG/DL — SIGNIFICANT CHANGE UP (ref 8.4–10.5)
CHLORIDE SERPL-SCNC: 103 MMOL/L — SIGNIFICANT CHANGE UP (ref 98–110)
CO2 SERPL-SCNC: 26 MMOL/L — SIGNIFICANT CHANGE UP (ref 17–32)
CREAT SERPL-MCNC: 0.8 MG/DL — SIGNIFICANT CHANGE UP (ref 0.7–1.5)
EGFR: 74 ML/MIN/1.73M2 — SIGNIFICANT CHANGE UP
EOSINOPHIL # BLD AUTO: 0.01 K/UL — SIGNIFICANT CHANGE UP (ref 0–0.7)
EOSINOPHIL NFR BLD AUTO: 0.1 % — SIGNIFICANT CHANGE UP (ref 0–8)
GLUCOSE SERPL-MCNC: 120 MG/DL — HIGH (ref 70–99)
HCT VFR BLD CALC: 41.4 % — SIGNIFICANT CHANGE UP (ref 37–47)
HGB BLD-MCNC: 12.9 G/DL — SIGNIFICANT CHANGE UP (ref 12–16)
IMM GRANULOCYTES NFR BLD AUTO: 1.6 % — HIGH (ref 0.1–0.3)
LYMPHOCYTES # BLD AUTO: 1.11 K/UL — LOW (ref 1.2–3.4)
LYMPHOCYTES # BLD AUTO: 8.7 % — LOW (ref 20.5–51.1)
MCHC RBC-ENTMCNC: 30.6 PG — SIGNIFICANT CHANGE UP (ref 27–31)
MCHC RBC-ENTMCNC: 31.2 G/DL — LOW (ref 32–37)
MCV RBC AUTO: 98.1 FL — SIGNIFICANT CHANGE UP (ref 81–99)
MONOCYTES # BLD AUTO: 0.46 K/UL — SIGNIFICANT CHANGE UP (ref 0.1–0.6)
MONOCYTES NFR BLD AUTO: 3.6 % — SIGNIFICANT CHANGE UP (ref 1.7–9.3)
NEUTROPHILS # BLD AUTO: 10.98 K/UL — HIGH (ref 1.4–6.5)
NEUTROPHILS NFR BLD AUTO: 85.7 % — HIGH (ref 42.2–75.2)
NRBC # BLD: 0 /100 WBCS — SIGNIFICANT CHANGE UP (ref 0–0)
PLATELET # BLD AUTO: 273 K/UL — SIGNIFICANT CHANGE UP (ref 130–400)
POTASSIUM SERPL-MCNC: 4.8 MMOL/L — SIGNIFICANT CHANGE UP (ref 3.5–5)
POTASSIUM SERPL-SCNC: 4.8 MMOL/L — SIGNIFICANT CHANGE UP (ref 3.5–5)
PROT SERPL-MCNC: 6.6 G/DL — SIGNIFICANT CHANGE UP (ref 6–8)
RBC # BLD: 4.22 M/UL — SIGNIFICANT CHANGE UP (ref 4.2–5.4)
RBC # FLD: 13.1 % — SIGNIFICANT CHANGE UP (ref 11.5–14.5)
SODIUM SERPL-SCNC: 140 MMOL/L — SIGNIFICANT CHANGE UP (ref 135–146)
WBC # BLD: 12.8 K/UL — HIGH (ref 4.8–10.8)
WBC # FLD AUTO: 12.8 K/UL — HIGH (ref 4.8–10.8)

## 2023-01-02 PROCEDURE — 99232 SBSQ HOSP IP/OBS MODERATE 35: CPT

## 2023-01-02 RX ADMIN — Medication 650 MILLIGRAM(S): at 20:07

## 2023-01-02 RX ADMIN — ATORVASTATIN CALCIUM 10 MILLIGRAM(S): 80 TABLET, FILM COATED ORAL at 21:21

## 2023-01-02 RX ADMIN — ENOXAPARIN SODIUM 40 MILLIGRAM(S): 100 INJECTION SUBCUTANEOUS at 05:42

## 2023-01-02 RX ADMIN — POLYETHYLENE GLYCOL 3350 17 GRAM(S): 17 POWDER, FOR SOLUTION ORAL at 11:40

## 2023-01-02 RX ADMIN — Medication 100 MILLIGRAM(S): at 21:22

## 2023-01-02 RX ADMIN — Medication 60 MILLIGRAM(S): at 05:42

## 2023-01-02 RX ADMIN — SENNA PLUS 2 TABLET(S): 8.6 TABLET ORAL at 21:22

## 2023-01-02 RX ADMIN — MORPHINE SULFATE 4 MILLIGRAM(S): 50 CAPSULE, EXTENDED RELEASE ORAL at 08:05

## 2023-01-02 RX ADMIN — AMLODIPINE BESYLATE 2.5 MILLIGRAM(S): 2.5 TABLET ORAL at 05:42

## 2023-01-02 RX ADMIN — MORPHINE SULFATE 4 MILLIGRAM(S): 50 CAPSULE, EXTENDED RELEASE ORAL at 20:07

## 2023-01-02 RX ADMIN — MORPHINE SULFATE 4 MILLIGRAM(S): 50 CAPSULE, EXTENDED RELEASE ORAL at 15:37

## 2023-01-02 RX ADMIN — MORPHINE SULFATE 4 MILLIGRAM(S): 50 CAPSULE, EXTENDED RELEASE ORAL at 16:05

## 2023-01-02 RX ADMIN — MORPHINE SULFATE 4 MILLIGRAM(S): 50 CAPSULE, EXTENDED RELEASE ORAL at 07:41

## 2023-01-02 RX ADMIN — Medication 25 MICROGRAM(S): at 05:42

## 2023-01-02 RX ADMIN — Medication 10 MILLILITER(S): at 05:42

## 2023-01-02 NOTE — CHART NOTE - NSCHARTNOTEFT_GEN_A_CORE
Saw the patient on behalf of Anesthesiology, Pain Management.    Patient with relevant hx of new onset T12 compression fracture. History of chronic back pain that was managed with chronic opioid therapy up until 2017/2018, then no opioid therapy since then. Patient with likely viral pneumonia, COPD exacerbation requiring O2 and causing dyspnea.    Her current regimen is Tramadol and Morphine IVP which she describes as inadequate. Patient reports that the fracture is too unstable for a kyphoplasty, however I expect neurosurgery to plan for an intervention as soon as she is medically optimized. In the interim, the following regimen is offered as a recommendation but not intended to be used in perpetuity.    Lidoderm patch to affected area  Oxycodone IR 5mg to 10mg Q4hrs prn moderate and severe pain, respectively.  Dilaudid 1mg IVP Q4 hours PRN breakthrough pain while inpatient.  Acetaminophen 1000mg [id number] standing.  Gabapentin 300mg Q8 hour

## 2023-01-03 LAB
ALBUMIN SERPL ELPH-MCNC: 4.1 G/DL — SIGNIFICANT CHANGE UP (ref 3.5–5.2)
ALP SERPL-CCNC: 96 U/L — SIGNIFICANT CHANGE UP (ref 30–115)
ALT FLD-CCNC: 20 U/L — SIGNIFICANT CHANGE UP (ref 0–41)
ANION GAP SERPL CALC-SCNC: 12 MMOL/L — SIGNIFICANT CHANGE UP (ref 7–14)
AST SERPL-CCNC: 15 U/L — SIGNIFICANT CHANGE UP (ref 0–41)
BASOPHILS # BLD AUTO: 0.04 K/UL — SIGNIFICANT CHANGE UP (ref 0–0.2)
BASOPHILS NFR BLD AUTO: 0.3 % — SIGNIFICANT CHANGE UP (ref 0–1)
BILIRUB SERPL-MCNC: 0.6 MG/DL — SIGNIFICANT CHANGE UP (ref 0.2–1.2)
BUN SERPL-MCNC: 19 MG/DL — SIGNIFICANT CHANGE UP (ref 10–20)
CALCIUM SERPL-MCNC: 9.4 MG/DL — SIGNIFICANT CHANGE UP (ref 8.4–10.5)
CHLORIDE SERPL-SCNC: 99 MMOL/L — SIGNIFICANT CHANGE UP (ref 98–110)
CO2 SERPL-SCNC: 27 MMOL/L — SIGNIFICANT CHANGE UP (ref 17–32)
CREAT SERPL-MCNC: 0.8 MG/DL — SIGNIFICANT CHANGE UP (ref 0.7–1.5)
EGFR: 74 ML/MIN/1.73M2 — SIGNIFICANT CHANGE UP
EOSINOPHIL # BLD AUTO: 0 K/UL — SIGNIFICANT CHANGE UP (ref 0–0.7)
EOSINOPHIL NFR BLD AUTO: 0 % — SIGNIFICANT CHANGE UP (ref 0–8)
GLUCOSE SERPL-MCNC: 154 MG/DL — HIGH (ref 70–99)
HCT VFR BLD CALC: 44.7 % — SIGNIFICANT CHANGE UP (ref 37–47)
HGB BLD-MCNC: 14.1 G/DL — SIGNIFICANT CHANGE UP (ref 12–16)
IMM GRANULOCYTES NFR BLD AUTO: 0.9 % — HIGH (ref 0.1–0.3)
LYMPHOCYTES # BLD AUTO: 1.07 K/UL — LOW (ref 1.2–3.4)
LYMPHOCYTES # BLD AUTO: 7.2 % — LOW (ref 20.5–51.1)
MCHC RBC-ENTMCNC: 31 PG — SIGNIFICANT CHANGE UP (ref 27–31)
MCHC RBC-ENTMCNC: 31.5 G/DL — LOW (ref 32–37)
MCV RBC AUTO: 98.2 FL — SIGNIFICANT CHANGE UP (ref 81–99)
MONOCYTES # BLD AUTO: 0.53 K/UL — SIGNIFICANT CHANGE UP (ref 0.1–0.6)
MONOCYTES NFR BLD AUTO: 3.6 % — SIGNIFICANT CHANGE UP (ref 1.7–9.3)
NEUTROPHILS # BLD AUTO: 13.06 K/UL — HIGH (ref 1.4–6.5)
NEUTROPHILS NFR BLD AUTO: 88 % — HIGH (ref 42.2–75.2)
NRBC # BLD: 0 /100 WBCS — SIGNIFICANT CHANGE UP (ref 0–0)
PLATELET # BLD AUTO: 276 K/UL — SIGNIFICANT CHANGE UP (ref 130–400)
POTASSIUM SERPL-MCNC: 4.8 MMOL/L — SIGNIFICANT CHANGE UP (ref 3.5–5)
POTASSIUM SERPL-SCNC: 4.8 MMOL/L — SIGNIFICANT CHANGE UP (ref 3.5–5)
PROT SERPL-MCNC: 6.7 G/DL — SIGNIFICANT CHANGE UP (ref 6–8)
RBC # BLD: 4.55 M/UL — SIGNIFICANT CHANGE UP (ref 4.2–5.4)
RBC # FLD: 13.1 % — SIGNIFICANT CHANGE UP (ref 11.5–14.5)
SODIUM SERPL-SCNC: 138 MMOL/L — SIGNIFICANT CHANGE UP (ref 135–146)
WBC # BLD: 14.84 K/UL — HIGH (ref 4.8–10.8)
WBC # FLD AUTO: 14.84 K/UL — HIGH (ref 4.8–10.8)

## 2023-01-03 PROCEDURE — 99233 SBSQ HOSP IP/OBS HIGH 50: CPT

## 2023-01-03 PROCEDURE — 70450 CT HEAD/BRAIN W/O DYE: CPT | Mod: 26

## 2023-01-03 RX ORDER — ACETAMINOPHEN 500 MG
650 TABLET ORAL EVERY 6 HOURS
Refills: 0 | Status: DISCONTINUED | OUTPATIENT
Start: 2023-01-03 | End: 2023-01-14

## 2023-01-03 RX ORDER — GABAPENTIN 400 MG/1
300 CAPSULE ORAL THREE TIMES A DAY
Refills: 0 | Status: DISCONTINUED | OUTPATIENT
Start: 2023-01-03 | End: 2023-01-14

## 2023-01-03 RX ORDER — ONDANSETRON 8 MG/1
4 TABLET, FILM COATED ORAL
Refills: 0 | Status: DISCONTINUED | OUTPATIENT
Start: 2023-01-03 | End: 2023-01-14

## 2023-01-03 RX ORDER — MORPHINE SULFATE 50 MG/1
4 CAPSULE, EXTENDED RELEASE ORAL EVERY 4 HOURS
Refills: 0 | Status: DISCONTINUED | OUTPATIENT
Start: 2023-01-03 | End: 2023-01-03

## 2023-01-03 RX ORDER — OXYCODONE HYDROCHLORIDE 5 MG/1
20 TABLET ORAL EVERY 4 HOURS
Refills: 0 | Status: DISCONTINUED | OUTPATIENT
Start: 2023-01-03 | End: 2023-01-10

## 2023-01-03 RX ORDER — OXYCODONE HYDROCHLORIDE 5 MG/1
10 TABLET ORAL EVERY 4 HOURS
Refills: 0 | Status: DISCONTINUED | OUTPATIENT
Start: 2023-01-03 | End: 2023-01-08

## 2023-01-03 RX ADMIN — Medication 650 MILLIGRAM(S): at 17:30

## 2023-01-03 RX ADMIN — TRAMADOL HYDROCHLORIDE 50 MILLIGRAM(S): 50 TABLET ORAL at 00:40

## 2023-01-03 RX ADMIN — Medication 2 MILLIGRAM(S): at 01:15

## 2023-01-03 RX ADMIN — Medication 100 MILLIGRAM(S): at 17:32

## 2023-01-03 RX ADMIN — POLYETHYLENE GLYCOL 3350 17 GRAM(S): 17 POWDER, FOR SOLUTION ORAL at 12:34

## 2023-01-03 RX ADMIN — SENNA PLUS 2 TABLET(S): 8.6 TABLET ORAL at 21:32

## 2023-01-03 RX ADMIN — Medication 100 MILLIGRAM(S): at 21:33

## 2023-01-03 RX ADMIN — MORPHINE SULFATE 4 MILLIGRAM(S): 50 CAPSULE, EXTENDED RELEASE ORAL at 11:34

## 2023-01-03 RX ADMIN — GABAPENTIN 300 MILLIGRAM(S): 400 CAPSULE ORAL at 21:32

## 2023-01-03 RX ADMIN — OXYCODONE HYDROCHLORIDE 20 MILLIGRAM(S): 5 TABLET ORAL at 20:09

## 2023-01-03 RX ADMIN — ONDANSETRON 4 MILLIGRAM(S): 8 TABLET, FILM COATED ORAL at 20:10

## 2023-01-03 RX ADMIN — ATORVASTATIN CALCIUM 10 MILLIGRAM(S): 80 TABLET, FILM COATED ORAL at 21:32

## 2023-01-03 RX ADMIN — Medication 25 MICROGRAM(S): at 05:32

## 2023-01-03 RX ADMIN — Medication 60 MILLIGRAM(S): at 05:32

## 2023-01-03 NOTE — PROVIDER CONTACT NOTE (OTHER) - ACTION/TREATMENT ORDERED:
head ct - pt refusing despite RN explaining risks of refusing head CT in relation to current mental state. Ezra DAVE made aware

## 2023-01-03 NOTE — PHYSICAL THERAPY INITIAL EVALUATION ADULT - LIVES WITH, PROFILE
Pt lives alone in a private home with ~ 10 steps to enter and 14 steps inside with one handrail./alone

## 2023-01-03 NOTE — CONSULT NOTE ADULT - SUBJECTIVE AND OBJECTIVE BOX
HPI: 82 y F pmhx of DLD, hypothyroid, chronic back pain, hx cerebral aneurysm s/p coiling (2010) p/w upper back pain and SOB. Pt states that for months she has been having SOB on exertion, she had a cardiac work up with no clear explantation for her dyspnea. She states it has progressively worsened, today she is dyspneic while talking, sitting up in stretcher. C/o back pain. Denies fever, chills, cp, cough, n/v/d, dysuria.  CT chest negative for PE, found to have b/l opacities, atypical/viral infection versus chronic interstitial lung disease.   ptn seen and exam  at  bed side  asking  for  her  pain  med  due  to  her  lbp 7/10 pain scale     PTN  REFERRED TO ACUTE  REHAB  FOR  EVAL AND  TX   PAST MEDICAL & SURGICAL HISTORY:  Hypothyroid      Hyperlipemia      Brain aneurysm      S/P appendectomy      S/P coil embolization of cerebral aneurysm    obesity       Hospital Course:    TODAY'S SUBJECTIVE & REVIEW OF SYMPTOMS:     Constitutional WNL   Cardio WNL   Resp WNL   GI WNL  Heme WNL  Endo WNL  Skin WNL  MSK WNL  Neuro WNL  Cognitive WNL  Psych WNL      MEDICATIONS  (STANDING):  amLODIPine   Tablet 2.5 milliGRAM(s) Oral daily  atorvastatin 10 milliGRAM(s) Oral at bedtime  enoxaparin Injectable 40 milliGRAM(s) SubCutaneous every 24 hours  levothyroxine 25 MICROGram(s) Oral daily  polyethylene glycol 3350 17 Gram(s) Oral daily  predniSONE   Tablet 60 milliGRAM(s) Oral daily  senna 2 Tablet(s) Oral at bedtime    MEDICATIONS  (PRN):  acetaminophen     Tablet .. 650 milliGRAM(s) Oral every 6 hours PRN Temp greater or equal to 38C (100.4F), Mild Pain (1 - 3)  albuterol    90 MICROgram(s) HFA Inhaler 1 Puff(s) Inhalation every 6 hours PRN Shortness of Breath  albuterol/ipratropium for Nebulization 3 milliLiter(s) Nebulizer every 6 hours PRN Shortness of Breath  benzonatate 100 milliGRAM(s) Oral three times a day PRN Cough  guaifenesin/dextromethorphan Oral Liquid 10 milliLiter(s) Oral every 6 hours PRN Cough  zolpidem 5 milliGRAM(s) Oral at bedtime PRN Insomnia  zolpidem 5 milliGRAM(s) Oral at bedtime PRN Insomnia      FAMILY HISTORY:      Allergies    Compazine (Unknown)    Intolerances        SOCIAL HISTORY:    [  ] Etoh  [  ] Smoking  [  ] Substance abuse     Home Environment:  [xx  ] Home Alone  [  ] Lives with Family  [  ] Home Health Aid    Dwelling:  [  ] Apartment  [x  ] Private House  [  ] Adult Home  [  ] Skilled Nursing Facility      [  ] Short Term  [  ] Long Term  [ x 14 steps ] Stairs       Elevator [  ]    FUNCTIONAL STATUS PTA: (Check all that apply)  Ambulation: [ x  ]Independent    [  ] Dependent     [  ] Non-Ambulatory  Assistive Device: [  ] SA Cane  [  ]  Q Cane  [  ] Walker  [  ]  Wheelchair  ADL : [ x ] Independent  [  ]  Dependent       Vital Signs Last 24 Hrs  T(C): 36.8 (03 Jan 2023 05:00), Max: 36.8 (03 Jan 2023 05:00)  T(F): 98.2 (03 Jan 2023 05:00), Max: 98.2 (03 Jan 2023 05:00)  HR: 83 (03 Jan 2023 05:00) (83 - 95)  BP: 162/69 (03 Jan 2023 05:00) (123/62 - 162/69)  BP(mean): --  RR: 18 (03 Jan 2023 05:00) (16 - 18)  SpO2: 97% (03 Jan 2023 01:06) (93% - 97%)    Parameters below as of 03 Jan 2023 01:06  Patient On (Oxygen Delivery Method): nasal cannula  O2 Flow (L/min): 2        PHYSICAL EXAM: Alert & Oriented X3  GENERAL: NAD, well-groomed, well-developed  HEAD:  Atraumatic, Normocephalic  EYES: EOMI, PERRLA, conjunctiva and sclera clear  NECK: Supple, No JVD, Normal thyroid  CHEST/LUNG: Clear to percussion bilaterally; No rales, rhonchi, wheezing, or rubs  HEART: Regular rate and rhythm; No murmurs, rubs, or gallops  ABDOMEN: Soft, Nontender, Nondistended; Bowel sounds present  EXTREMITIES:  2+ Peripheral Pulses, No clubbing, cyanosis, or edema    NERVOUS SYSTEM:  Cranial Nerves 2-12 intact [ x ] Abnormal  [  ]  ROM: WFL all extremities [x ]  Abnormal [  ]  Motor Strength: WFL all extremities  [ x ]  Abnormal [  ]  Sensation: intact to light touch [ x ] Abnormal [  ]  Reflexes: Symmetric [ x ]  Abnormal [  ]    FUNCTIONAL STATUS:  Bed Mobility: Independent [  ]  Supervision [  ]  Needs Assistance [  x]  N/A [  ]  Transfers: Independent [  ]  Supervision [  ]  Needs Assistance [x  ]  N/A [  ]   Ambulation: Independent [  ]  Supervision [  ]  Needs Assistance [ x ]  N/A [  ]  ADL: Independent [  ] Requires Assistance [  ] N/A [ x ]  SEE PT/OT IE NOTES    LABS:                        12.9   12.80 )-----------( 273      ( 02 Jan 2023 09:33 )             41.4     01-02    140  |  103  |  18  ----------------------------<  120<H>  4.8   |  26  |  0.8    Ca    9.2      02 Jan 2023 09:33    TPro  6.6  /  Alb  4.1  /  TBili  0.6  /  DBili  x   /  AST  17  /  ALT  21  /  AlkPhos  90  01-02          RADIOLOGY & ADDITIONAL STUDIES:< from: Xray Chest 1 View- PORTABLE-Urgent (12.26.22 @ 15:55) >  Impression:    No radiographic evidence of acute cardiopulmonary disease.      < end of copied text >< from: CT Thoracic Spine No Cont (12.28.22 @ 17:21) >  IMPRESSION:    1.  Severe compression fracture of the T12 vertebral body with   retropulsion of the posterior aspect of the vertebral body into the   spinal canal by about 5 mm.    2.  Moderate anterior wedge compression deformity of T11, mild/moderate   compression deformity of T5, and trace compression deformities of T4, T9   and T10.    3.  The above compression fractures are age indeterminate. Further     < end of copied text >      Assesment:

## 2023-01-03 NOTE — CONSULT NOTE ADULT - SUBJECTIVE AND OBJECTIVE BOX
82y F pmhx of DLD, hypothyroid, chronic back pain, hx cerebral aneurysm s/p coiling (2010) p/w upper back pain and SOB. Pt states that for months she has been having SOB on exertion, she had a cardiac work up with no clear explantation for her dyspnea. She states it has progressively worsened, today she is dyspneic while talking, sitting up in stretcher. C/o back pain. Denies fever, chills, cp, cough, n/v/d, dysuria. CT chest negative for PE, found to have b/l opacities, atypical/viral infection versus chronic interstitial lung disease. Had an MRI of the T-Spine which showed mulilevel compression deformities. Was previously in pain management with Dr. Ziegler and taking Dilaudid 4 mg q4h until 2018 at which point patient withdrew from opioids. Reports good relief at present with Morphine IV.

## 2023-01-03 NOTE — PHYSICAL THERAPY INITIAL EVALUATION ADULT - PERTINENT HX OF CURRENT PROBLEM, REHAB EVAL
82y F pmhx of DLD, hypothyroid, chronic back pain, hx cerebral aneurysm s/p coiling (2010) p/w upper back pain and SOB. Pt states that for months she has been having SOB on exertion, she had a cardiac work up with no clear explantation for her dyspnea. She states it has progressively worsened, today she is dyspneic while talking, sitting up in stretcher. C/o back pain. Denies fever, chills, cp, cough, n/v/d, dysuria.  CT chest negative for PE, found to have b/l opacities, atypical/viral infection versus chronic interstitial lung disease.  MRI reviewed with Dr Hwang , pt may participate with PT , pain management No acute Neurosurgical intervention at this time.

## 2023-01-03 NOTE — CONSULT NOTE ADULT - ASSESSMENT
IMPRESSION: Rehab of 81 y/o  f rehab  for  debility  T12 comp  fx  lbp      PRECAUTIONS: [  ] Cardiac  [  ] Respiratory  [  ] Seizures [  ] Contact Isolation  [  ] Droplet Isolation  [ FALL ] Other    Weight Bearing Status:     RECOMMENDATION:    Out of Bed to Chair     DVT/Decubiti Prophylaxis    REHAB PLAN:     [ x  ] Bedside P/T 3-5 times a week   [   ]   Bedside O/T  2-3 times a week             [   ] No Rehab Therapy Indicated                   [   ]  Speech Therapy   Conditioning/ROM                                    ADL  Bed Mobility                                               Conditioning/ROM  Transfers                                                     Bed Mobility  Sitting /Standing Balance                         Transfers                                        Gait Training                                               Sitting/Standing Balance  Stair Training [   ]Applicable                    Home equipment Eval                                                                        Splinting  [   ] Only      GOALS:   ADL   [ x  ]   Independent                    Transfers  [   x] Independent                          Ambulation  [  x ] Independent     [    ] With device                            [  x ]  CG                                                         [x   ]  CG                                                                  [   x] CG                            [    ] Min A                                                   [   ] Min A                                                              [   ] Min  A          DISCHARGE PLAN:   [   ]  Good candidate for Intensive Rehabilitation/Hospital based-[facility]A [facility]                                             Will tolerate 3hrs Intensive Rehab Daily                                       [  xx  ]  Short Term Rehab in Skilled Nursing Facility and  pain med  ptn agree                                        [    ]  Home with Outpatient or VN services                                         [    ]  Possible Candidate for Intensive Hospital based Rehab                                       
acute hypoxic respiratory failure  ILD with exacerbation  possible viral infection      agree with present management  course of prednisone as ordered  oxygen per pulse oximetry  analgesia/neurosurgical work up  repeat ct chest 3 months  will review office records and make any further recommendations  gi/dvt prophylaxis  out of bed as tolerated/incentive spirometry  prognosis guarded    thank you for the courtesy of this consultation, will follow with you    
82/yo Female with a history of hypothyroidism, HL, cebrebral aneursysm s/p coiling, and chornic low back pain not on chronic opioids who was admitted with dyspnea and upper back pain.     Thoracic Vertebral Compression Fx  - evaluated by NSGY, no intervention at this time, outpatient follow up   - start Oxycodone IR 10 mg q4h prn  - decrease morphine IV to 4 mg q8h prn, add Morphine 2 mg IV q8h hr prn for breakthrough  - change tylenol to 650 mg q6h standing  - start gabapentin 300 mg TID
ASSESSMENT  82y F pmhx of DLD, hypothyroid, chronic back pain, hx cerebral aneurysm s/p coiling (2010) p/w upper back pain and SOB      IMPRESSION  #Dyspnea  #Diffuse interstitial opacities - chronic ILD vs atypical infection/viral infection   - Flu/COVID/RSV negative  - WBC Count: 12.01 K/uL (12.26.22 @ 15:10) on admission     #Hx of cerebral aneurysm   #Chronic Back Pain     #Abx allergy: Compazine (Unknown)    RECOMMENDATIONS  - check procalcitonin   - continue ceftriaxone 1g daily   - continue azithromycin 500 mg daily   - if procalcitonin < 0.25, suggestive of possible viral infection and would stop antibiotics     Please call or message on [employer] Teams if with any questions.  Spectra [phone]

## 2023-01-03 NOTE — PROVIDER CONTACT NOTE (OTHER) - SITUATION
pt given lorazepam hours earlier then became confused. Having fluctuating changes with mental status and now very paranoid and suspicious.

## 2023-01-03 NOTE — CHART NOTE - NSCHARTNOTEFT_GEN_A_CORE
Called by nurse for concern for change in mental status , increasing paranoia, and incomprehensible sense construction that don't make sense. The patient 82y F has a  pmhx of DLD, hypothyroid, chronic back pain, hx cerebral aneurysm s/p coiling (2010) p/w upper back pain and SOB.   Exam:  neuro AO x2 place and time , but some speech issues noted  no neuro  focal deficit  at present time  A/P  I personnally interview  the patient and agree with nurse Sherron.  will order urgent head CT  due to HX of previous head surgery and coiling.  Case D/W DR Ramirez.  case will be sign out to incoming PA Called by nurse for concern for change in mental status , increasing paranoia, and incomprehensible sense construction that don't make sense. The patient 82y F has a  pmhx of DLD, hypothyroid, chronic back pain, hx cerebral aneurysm s/p coiling (2010) p/w upper back pain and SOB.   Exam:  neuro AO x2 place and time , but some speech issues noted  no neuro  focal deficit  at present time  A/P  I personnally interview  the patient and agree with nurse Sherron.  will order urgent head CT  due to HX of previous head surgery and coiling.  Case D/W DR Ramirez.  case will be sign out to incoming PA/JOLIE ROGERS/ DON Called by nurse for concern for change in mental status , increasing paranoia, and incomprehensible sense construction that don't make sense. The patient 82y F has a  pmhx of DLD, hypothyroid, chronic back pain, hx cerebral aneurysm s/p coiling (2010) p/w upper back pain and SOB.   Exam:  neuro AO x2 place and time , but some speech issues noted  no neuro  focal deficit  at present time  A/P  I personnally interview  the patient and agree with nurse Sherron.  will order urgent head CT  due to HX of previous head surgery and coiling.  Case D/W DR Ramirez.  case will be sign out to incoming PA/PA KEN/ DON  addendum; Pt refus e head Ct.   psych evaluation for competency to make medical decisions

## 2023-01-03 NOTE — PHYSICAL THERAPY INITIAL EVALUATION ADULT - GENERAL OBSERVATIONS, REHAB EVAL
18:43-19:08. Chart reviewed; confirmed with RN to see the pt for PT. Pt ready for PT; received in bed with no complain of pain and in NAD. +hep lock R UE. Agreeable for PT evaluation.

## 2023-01-03 NOTE — PROVIDER CONTACT NOTE (OTHER) - ASSESSMENT
Pt now Ox1-2, unaware of location, time or current president. Extremely suspicious of staff stating "they're trying to kill me"

## 2023-01-03 NOTE — PROVIDER CONTACT NOTE (OTHER) - BACKGROUND
pt previously A&Ox4. Had difficulty breathing with anxiety earlier in shift, offered cough medicine and neb treatment which pt refused. Pt then agreeable to IV ativan to help anxiety and breathing

## 2023-01-04 LAB — SARS-COV-2 RNA SPEC QL NAA+PROBE: SIGNIFICANT CHANGE UP

## 2023-01-04 PROCEDURE — 99232 SBSQ HOSP IP/OBS MODERATE 35: CPT

## 2023-01-04 RX ORDER — LIDOCAINE 4 G/100G
1 CREAM TOPICAL DAILY
Refills: 0 | Status: DISCONTINUED | OUTPATIENT
Start: 2023-01-04 | End: 2023-01-14

## 2023-01-04 RX ADMIN — AMLODIPINE BESYLATE 2.5 MILLIGRAM(S): 2.5 TABLET ORAL at 06:28

## 2023-01-04 RX ADMIN — Medication 650 MILLIGRAM(S): at 06:29

## 2023-01-04 RX ADMIN — Medication 650 MILLIGRAM(S): at 11:22

## 2023-01-04 RX ADMIN — POLYETHYLENE GLYCOL 3350 17 GRAM(S): 17 POWDER, FOR SOLUTION ORAL at 11:22

## 2023-01-04 RX ADMIN — Medication 25 MICROGRAM(S): at 06:28

## 2023-01-04 RX ADMIN — Medication 650 MILLIGRAM(S): at 01:23

## 2023-01-04 RX ADMIN — OXYCODONE HYDROCHLORIDE 20 MILLIGRAM(S): 5 TABLET ORAL at 21:30

## 2023-01-04 RX ADMIN — LIDOCAINE 1 PATCH: 4 CREAM TOPICAL at 16:01

## 2023-01-04 RX ADMIN — OXYCODONE HYDROCHLORIDE 20 MILLIGRAM(S): 5 TABLET ORAL at 02:45

## 2023-01-04 RX ADMIN — GABAPENTIN 300 MILLIGRAM(S): 400 CAPSULE ORAL at 21:31

## 2023-01-04 RX ADMIN — ATORVASTATIN CALCIUM 10 MILLIGRAM(S): 80 TABLET, FILM COATED ORAL at 21:31

## 2023-01-04 RX ADMIN — GABAPENTIN 300 MILLIGRAM(S): 400 CAPSULE ORAL at 06:28

## 2023-01-04 RX ADMIN — Medication 650 MILLIGRAM(S): at 17:39

## 2023-01-04 RX ADMIN — OXYCODONE HYDROCHLORIDE 20 MILLIGRAM(S): 5 TABLET ORAL at 13:23

## 2023-01-04 RX ADMIN — SENNA PLUS 2 TABLET(S): 8.6 TABLET ORAL at 21:31

## 2023-01-04 RX ADMIN — Medication 100 MILLIGRAM(S): at 21:31

## 2023-01-04 RX ADMIN — GABAPENTIN 300 MILLIGRAM(S): 400 CAPSULE ORAL at 13:23

## 2023-01-04 RX ADMIN — ENOXAPARIN SODIUM 40 MILLIGRAM(S): 100 INJECTION SUBCUTANEOUS at 06:28

## 2023-01-05 ENCOUNTER — TRANSCRIPTION ENCOUNTER (OUTPATIENT)
Age: 83
End: 2023-01-05

## 2023-01-05 RX ADMIN — GABAPENTIN 300 MILLIGRAM(S): 400 CAPSULE ORAL at 21:31

## 2023-01-05 RX ADMIN — Medication 650 MILLIGRAM(S): at 11:36

## 2023-01-05 RX ADMIN — ATORVASTATIN CALCIUM 10 MILLIGRAM(S): 80 TABLET, FILM COATED ORAL at 21:30

## 2023-01-05 RX ADMIN — AMLODIPINE BESYLATE 2.5 MILLIGRAM(S): 2.5 TABLET ORAL at 05:33

## 2023-01-05 RX ADMIN — Medication 650 MILLIGRAM(S): at 11:48

## 2023-01-05 RX ADMIN — OXYCODONE HYDROCHLORIDE 20 MILLIGRAM(S): 5 TABLET ORAL at 21:30

## 2023-01-05 RX ADMIN — Medication 650 MILLIGRAM(S): at 23:09

## 2023-01-05 RX ADMIN — GABAPENTIN 300 MILLIGRAM(S): 400 CAPSULE ORAL at 13:40

## 2023-01-05 RX ADMIN — GABAPENTIN 300 MILLIGRAM(S): 400 CAPSULE ORAL at 05:33

## 2023-01-05 RX ADMIN — OXYCODONE HYDROCHLORIDE 20 MILLIGRAM(S): 5 TABLET ORAL at 22:00

## 2023-01-05 RX ADMIN — POLYETHYLENE GLYCOL 3350 17 GRAM(S): 17 POWDER, FOR SOLUTION ORAL at 11:49

## 2023-01-05 RX ADMIN — Medication 650 MILLIGRAM(S): at 18:18

## 2023-01-05 RX ADMIN — LIDOCAINE 1 PATCH: 4 CREAM TOPICAL at 23:18

## 2023-01-05 RX ADMIN — Medication 650 MILLIGRAM(S): at 01:04

## 2023-01-05 RX ADMIN — OXYCODONE HYDROCHLORIDE 10 MILLIGRAM(S): 5 TABLET ORAL at 10:36

## 2023-01-05 RX ADMIN — Medication 650 MILLIGRAM(S): at 05:33

## 2023-01-05 RX ADMIN — ENOXAPARIN SODIUM 40 MILLIGRAM(S): 100 INJECTION SUBCUTANEOUS at 05:33

## 2023-01-05 RX ADMIN — LIDOCAINE 1 PATCH: 4 CREAM TOPICAL at 11:49

## 2023-01-05 RX ADMIN — Medication 25 MICROGRAM(S): at 05:33

## 2023-01-05 RX ADMIN — Medication 10 MILLILITER(S): at 21:31

## 2023-01-05 RX ADMIN — LIDOCAINE 1 PATCH: 4 CREAM TOPICAL at 21:48

## 2023-01-05 NOTE — DISCHARGE NOTE PROVIDER - CARE PROVIDER_API CALL
Ammon Hilliard)  Internal Medicine  [address] [facility], [address]  [place] [address]  Phone: [phone]  Fax: [phone]  Follow Up Time: 1 week    Rob Rucker (DO)  Critical Care Medicine; Internal Medicine; Pulmonary Disease  [address] [place] [address]  Phone: [phone]  Fax: [phone]  Follow Up Time: 1 week    Lion Hwang)  Surgery  Neurosurgery  [address] [place] [address]  Phone: [phone]  Fax: [phone]  Follow Up Time: 1 week    GI clinic,   Gastroenterology; Internal Medicine  [address] [place] [address]  Phone: [phone]  Fax: [phone]  Phone: (   )    -  Fax: (   )    -  Follow Up Time:    Rob Rucker (DO)  Critical Care Medicine; Internal Medicine; Pulmonary Disease  [address] [place] [address]  Phone: [phone]  Fax: [phone]  Follow Up Time: 2 weeks    Lion Hwang)  Surgery  Neurosurgery  [address] [place] [address]  Phone: [phone]  Fax: [phone]  Follow Up Time: 2 months    Klesey Caballero)  Geriatric Medicine; Internal Medicine  [address] [place] [address]  Phone: [phone]  Fax: [phone]  Follow Up Time: 2 weeks    GI clinic,   Gastroenterology; Internal Medicine  [address] [place] [address]  Phone: [phone]  Fax: [phone]  Phone: (   )    -  Fax: (   )    -  Follow Up Time:     Danny [place] [address]  Phone: [phone]  Fax: [phone]  Follow Up Time:

## 2023-01-05 NOTE — DISCHARGE NOTE PROVIDER - NSDCFUSCHEDAPPT_GEN_ALL_CORE_FT
Leslie Garcia Physician [facility]  CARDIOLOGY [address]  Scheduled Appointment: 01/10/2023

## 2023-01-05 NOTE — DISCHARGE NOTE PROVIDER - NSDCCPCAREPLAN_GEN_ALL_CORE_FT
PRINCIPAL DISCHARGE DIAGNOSIS  Diagnosis: Shortness of breath  Assessment and Plan of Treatment: seen by pulmonology , treated with IV steroids, SOB improved follow up with your pulmonologist as outpt.   you were seen by ID initially you were treated with IV abx, afebrile  Smoking cessation advised.      SECONDARY DISCHARGE DIAGNOSES  Diagnosis: Upper back pain  Assessment and Plan of Treatment: you have T12 fracture, you were seen by neurosugery and pain managment, no acut surgical intervention, you need physical therapy and pain meds. Follow up with neurosurgery as outpt    Diagnosis: Hepatic cyst  Assessment and Plan of Treatment: follow up with your GI or GI clinic     PRINCIPAL DISCHARGE DIAGNOSIS  Diagnosis: Shortness of breath  Assessment and Plan of Treatment: - Placed on supplemental O2   - Seen by pulmonology , treated with IV steroids, SOB improved  - You will remain on supllemental oxygen at home   - follow up with your pulmonologist as outpt.   - Smoking cessation advised.      SECONDARY DISCHARGE DIAGNOSES  Diagnosis: Upper back pain  Assessment and Plan of Treatment: - you have T12 fracture  - you were seen by neurosugery with no acute surgical intervention  - you need physical therapy and pain meds  - Follow up with pain management and neurosurgery as outpt    Diagnosis: Hepatic cyst  Assessment and Plan of Treatment: - follow up with your GI or GI clinic

## 2023-01-05 NOTE — DISCHARGE NOTE PROVIDER - NPI NUMBER (FOR SYSADMIN USE ONLY) :
[[id number]],[[id number]] [[id number]],[UNKNOWN],[[id number]]

## 2023-01-05 NOTE — DISCHARGE NOTE PROVIDER - NSDCMRMEDTOKEN_GEN_ALL_CORE_FT
ALBUTEROL HFA 90 MCG INHALER:   AMLODIPINE BESYLATE 2.5 MG TAB:   ATORVASTATIN 10 MG TABLET:   LEVOTHYROXINE 25 MCG TABLET:   PREDNISONE 20 MG TABLET:    acetaminophen 325 mg oral tablet: 2 tab(s) orally every 6 hours  ALBUTEROL HFA 90 MCG INHALER:   AMLODIPINE BESYLATE 2.5 MG TAB:   ATORVASTATIN 10 MG TABLET: tab(s) orally once a day (at bedtime)  benzonatate 100 mg oral capsule: 1 cap(s) orally 3 times a day, As needed, Cough  gabapentin 300 mg oral capsule: 1 cap(s) orally 3 times a day  guaifenesin-dextromethorphan 100 mg-10 mg/5 mL oral liquid: 10 milliliter(s) orally every 6 hours, As needed, Cough  ipratropium-albuterol 0.5 mg-2.5 mg/3 mL inhalation solution: 3 milliliter(s) inhaled every 6 hours, As needed, Shortness of Breath  LEVOTHYROXINE 25 MCG TABLET: tab(s) orally once a day  lidocaine 4% topical film: Apply topically to affected area once a day  oxyCODONE 10 mg oral tablet: 1 tab(s) orally every 4 hours, As needed, Moderate Pain (4 - 6)  oxyCODONE 20 mg oral tablet: 1 tab(s) orally every 4 hours, As needed, Severe Pain (7 - 10)  polyethylene glycol 3350 oral powder for reconstitution: 17 gram(s) orally once a day  senna leaf extract oral tablet: 2 tab(s) orally once a day (at bedtime)   acetaminophen 325 mg oral tablet: 2 tab(s) orally every 6 hours  ALBUTEROL HFA 90 MCG INHALER:   AMLODIPINE BESYLATE 2.5 MG TAB:   ATORVASTATIN 10 MG TABLET: tab(s) orally once a day (at bedtime)  benzonatate 100 mg oral capsule: 1 cap(s) orally 3 times a day, As needed, Cough  gabapentin 300 mg oral capsule: 1 cap(s) orally 3 times a day  guaifenesin-dextromethorphan 100 mg-10 mg/5 mL oral liquid: 10 milliliter(s) orally every 6 hours, As needed, Cough  ipratropium-albuterol 0.5 mg-2.5 mg/3 mL inhalation solution: 3 milliliter(s) inhaled every 6 hours, As needed, Shortness of Breath  LEVOTHYROXINE 25 MCG TABLET: tab(s) orally once a day  lidocaine 4% topical film: Apply topically to affected area once a day  oxyCODONE 10 mg oral tablet: 1 tab(s) orally every 4 hours, As needed, Moderate Pain (4 - 6)  oxyCODONE 20 mg oral tablet: 1 tab(s) orally every 4 hours, As needed, Severe Pain (7 - 10)  polyethylene glycol 3350 oral powder for reconstitution: 17 gram(s) orally once a day  senna leaf extract oral tablet: 2 tab(s) orally once a day (at bedtime)  Symbicort 160 mcg-4.5 mcg/inh inhalation aerosol: 2 puff(s) inhaled 2 times a day  tiotropium 2.5 mcg/inh inhalation aerosol: 2 puff(s) inhaled once a day   acetaminophen 325 mg oral tablet: 2 tab(s) orally every 6 hours  ALBUTEROL HFA 90 MCG INHALER:   AMLODIPINE BESYLATE 2.5 MG TAB:   ATORVASTATIN 10 MG TABLET: tab(s) orally once a day (at bedtime)  benzonatate 100 mg oral capsule: 1 cap(s) orally 3 times a day, As needed, Cough  gabapentin 300 mg oral capsule: 1 cap(s) orally 3 times a day MDD:900mg / day  guaifenesin-dextromethorphan 100 mg-10 mg/5 mL oral liquid: 10 milliliter(s) orally every 6 hours, As needed, Cough  LEVOTHYROXINE 25 MCG TABLET: tab(s) orally once a day  lidocaine 4% topical film: Apply topically to affected area once a day  polyethylene glycol 3350 oral powder for reconstitution: 17 gram(s) orally once a day  senna leaf extract oral tablet: 2 tab(s) orally once a day (at bedtime)  Symbicort 160 mcg-4.5 mcg/inh inhalation aerosol: 2 puff(s) inhaled 2 times a day  tiotropium 2.5 mcg/inh inhalation aerosol: 2 puff(s) inhaled once a day

## 2023-01-05 NOTE — DISCHARGE NOTE PROVIDER - HOSPITAL COURSE
82y F pmhx of DLD, hypothyroid, chronic back pain, hx cerebral aneurysm s/p coiling (2010) p/w upper back pain and SOB.  Pt states that for months she has been having SOB on exertion, she had a cardiac work up with no clear explantation for her dyspnea. She states it has progressively worsened acutely a few days ago.  PT admitted for diffuse interstitial opacities - chronic ILD vs atypical infection/viral infection or possible COPD exacerbation  -CT angio PE: no PE, slight patchy GGO diffusely , Procal normal, pt seen by ID, initially treated with IV abx, then stopped. Pt seen by pulmonology , treated with IV steroids, then tapered off. Pt to follow up with pulmonology as outpt. Smoking cessation advised.   PT with back pain,  had CT showed severe Thoracic compression fracture T12, had , MR LS reviewed with neurosurgery, no acute intervention, aggressive   physical therapy and outpt follow up. PT seen by pain mgmt , treated with pain meds. PT will need SNF placement for physical therapy.   Hepatic cyst,  f/u OP. Pt to follow up with pcp . 82y F pmhx of DLD, hypothyroid, chronic back pain, hx cerebral aneurysm s/p coiling (2010) p/w upper back pain and SOB.  Pt states that for months she has been having SOB on exertion, she had a cardiac work up with no clear explantation for her dyspnea. Pt admitted for diffuse interstitial opacities - chronic ILD vs atypical infection/viral infection or possible COPD exacerbation    #Dyspnea   #Chronic ILD   -CT angio PE: no PE, slight patchy GGO diffusely. Procal normal. RVP negative. Pt seen by ID, initially treated with IV abx, then stopped given no improvement and no evidence of infection. Pt seen by pulmonology, treated with IV steroids, then tapered off. Managed to be stabilized on 3L NC. NCM arranged for home O2 delivery. Pt to follow up with pulmonology as outpt. Smoking cessation advised.     #Back Pain  #T12 compression fx  Pt with back pain,  had CT showed severe Thoracic compression fracture T12. MR LS reviewed with neurosurgery, no acute intervention. Recommended conservative management for 6-8 weeks with PT and OT. Seen by PT and PM&R while inpatient and recommended for STR, but preferred to be discharged home.  Should follow up in NSGY clinic with Dr. Hwang if no improvement     #Chronic Pain  Seen by pain management. Pain controlled with PO oxycodone and IV morphine prn. Eventually transitioned to PO Oxycodone only, and can continue this on discharge for pain management. Advised she would need to follow up with pain management clinic for continued management and refills.     #Hepatic cyst   f/u OP. Pt to follow up with pcp.     #Hx of cerebral aneurysm s/p coling  No new deficits. Cont outpatient follow up     #Hypothryoid  Cont synthroid     #HLD  Cont statin

## 2023-01-05 NOTE — DISCHARGE NOTE PROVIDER - CARE PROVIDERS DIRECT ADDRESSES
,[email or web address],DirectAddress_Unknown,[email or web address] ,[email or web address],DirectAddress_Unknown,[email or web address],DirectAddress_Unknown,DirectAddress_Unknown

## 2023-01-05 NOTE — DISCHARGE NOTE PROVIDER - PROVIDER TOKENS
PROVIDER:[TOKEN:[[id number]],FOLLOWUP:[1 week]],PROVIDER:[TOKEN:[[id number]],FOLLOWUP:[1 week]],PROVIDER:[TOKEN:[[id number]],FOLLOWUP:[1 week]],FREE:[LAST:[GI clinic],PHONE:[(   )    -],FAX:[(   )    -],ADDRESS:[Gastroenterology; Internal Medicine  [address] [place] [address]  Phone: [phone]  Fax: [phone]]] PROVIDER:[TOKEN:[[id number]],FOLLOWUP:[2 weeks]],PROVIDER:[TOKEN:[[id number]],FOLLOWUP:[2 months]],PROVIDER:[TOKEN:[[id number]],FOLLOWUP:[2 weeks]],FREE:[LAST:[GI clinic],PHONE:[(   )    -],FAX:[(   )    -],ADDRESS:[Gastroenterology; Internal Medicine  [address] [place] [address]  Phone: [phone]  Fax: [phone]]],PROVIDER:[TOKEN:[[id number]]]

## 2023-01-06 RX ORDER — POLYETHYLENE GLYCOL 3350 17 G/17G
17 POWDER, FOR SOLUTION ORAL
Qty: 0 | Refills: 0 | DISCHARGE
Start: 2023-01-06

## 2023-01-06 RX ORDER — OXYCODONE HYDROCHLORIDE 5 MG/1
1 TABLET ORAL
Qty: 0 | Refills: 0 | DISCHARGE
Start: 2023-01-06

## 2023-01-06 RX ORDER — LIDOCAINE 4 G/100G
1 CREAM TOPICAL
Qty: 0 | Refills: 0 | DISCHARGE
Start: 2023-01-06

## 2023-01-06 RX ORDER — IPRATROPIUM/ALBUTEROL SULFATE 18-103MCG
3 AEROSOL WITH ADAPTER (GRAM) INHALATION
Qty: 0 | Refills: 0 | DISCHARGE
Start: 2023-01-06

## 2023-01-06 RX ORDER — GABAPENTIN 400 MG/1
1 CAPSULE ORAL
Qty: 0 | Refills: 0 | DISCHARGE
Start: 2023-01-06

## 2023-01-06 RX ORDER — ZOLPIDEM TARTRATE 10 MG/1
5 TABLET ORAL ONCE
Refills: 0 | Status: DISCONTINUED | OUTPATIENT
Start: 2023-01-06 | End: 2023-01-06

## 2023-01-06 RX ORDER — SENNA PLUS 8.6 MG/1
2 TABLET ORAL
Qty: 0 | Refills: 0 | DISCHARGE
Start: 2023-01-06

## 2023-01-06 RX ORDER — ATORVASTATIN CALCIUM 80 MG/1
0 TABLET, FILM COATED ORAL
Qty: 0 | Refills: 0 | DISCHARGE

## 2023-01-06 RX ORDER — ACETAMINOPHEN 500 MG
2 TABLET ORAL
Qty: 0 | Refills: 0 | DISCHARGE
Start: 2023-01-06

## 2023-01-06 RX ORDER — GUAIFENESIN/DEXTROMETHORPHAN 600MG-30MG
10 TABLET, EXTENDED RELEASE 12 HR ORAL
Qty: 0 | Refills: 0 | DISCHARGE
Start: 2023-01-06

## 2023-01-06 RX ORDER — LEVOTHYROXINE SODIUM 125 MCG
0 TABLET ORAL
Qty: 0 | Refills: 0 | DISCHARGE

## 2023-01-06 RX ADMIN — AMLODIPINE BESYLATE 2.5 MILLIGRAM(S): 2.5 TABLET ORAL at 05:29

## 2023-01-06 RX ADMIN — SENNA PLUS 2 TABLET(S): 8.6 TABLET ORAL at 21:49

## 2023-01-06 RX ADMIN — Medication 100 MILLIGRAM(S): at 21:54

## 2023-01-06 RX ADMIN — Medication 100 MILLIGRAM(S): at 12:16

## 2023-01-06 RX ADMIN — ZOLPIDEM TARTRATE 5 MILLIGRAM(S): 10 TABLET ORAL at 02:11

## 2023-01-06 RX ADMIN — OXYCODONE HYDROCHLORIDE 10 MILLIGRAM(S): 5 TABLET ORAL at 21:51

## 2023-01-06 RX ADMIN — GABAPENTIN 300 MILLIGRAM(S): 400 CAPSULE ORAL at 21:48

## 2023-01-06 RX ADMIN — Medication 650 MILLIGRAM(S): at 12:06

## 2023-01-06 RX ADMIN — Medication 650 MILLIGRAM(S): at 17:22

## 2023-01-06 RX ADMIN — OXYCODONE HYDROCHLORIDE 10 MILLIGRAM(S): 5 TABLET ORAL at 12:08

## 2023-01-06 RX ADMIN — ENOXAPARIN SODIUM 40 MILLIGRAM(S): 100 INJECTION SUBCUTANEOUS at 05:29

## 2023-01-06 RX ADMIN — LIDOCAINE 1 PATCH: 4 CREAM TOPICAL at 12:18

## 2023-01-06 RX ADMIN — OXYCODONE HYDROCHLORIDE 10 MILLIGRAM(S): 5 TABLET ORAL at 21:54

## 2023-01-06 RX ADMIN — Medication 25 MICROGRAM(S): at 05:29

## 2023-01-06 RX ADMIN — Medication 650 MILLIGRAM(S): at 13:00

## 2023-01-06 RX ADMIN — POLYETHYLENE GLYCOL 3350 17 GRAM(S): 17 POWDER, FOR SOLUTION ORAL at 12:06

## 2023-01-06 RX ADMIN — OXYCODONE HYDROCHLORIDE 10 MILLIGRAM(S): 5 TABLET ORAL at 13:00

## 2023-01-06 RX ADMIN — LIDOCAINE 1 PATCH: 4 CREAM TOPICAL at 23:52

## 2023-01-06 RX ADMIN — ATORVASTATIN CALCIUM 10 MILLIGRAM(S): 80 TABLET, FILM COATED ORAL at 21:50

## 2023-01-06 RX ADMIN — Medication 650 MILLIGRAM(S): at 05:28

## 2023-01-06 RX ADMIN — Medication 650 MILLIGRAM(S): at 18:20

## 2023-01-06 RX ADMIN — OXYCODONE HYDROCHLORIDE 20 MILLIGRAM(S): 5 TABLET ORAL at 17:22

## 2023-01-06 RX ADMIN — GABAPENTIN 300 MILLIGRAM(S): 400 CAPSULE ORAL at 05:29

## 2023-01-06 RX ADMIN — GABAPENTIN 300 MILLIGRAM(S): 400 CAPSULE ORAL at 13:36

## 2023-01-07 LAB — SARS-COV-2 RNA SPEC QL NAA+PROBE: SIGNIFICANT CHANGE UP

## 2023-01-07 RX ADMIN — OXYCODONE HYDROCHLORIDE 10 MILLIGRAM(S): 5 TABLET ORAL at 05:39

## 2023-01-07 RX ADMIN — OXYCODONE HYDROCHLORIDE 10 MILLIGRAM(S): 5 TABLET ORAL at 04:30

## 2023-01-07 RX ADMIN — Medication 650 MILLIGRAM(S): at 12:06

## 2023-01-07 RX ADMIN — LIDOCAINE 1 PATCH: 4 CREAM TOPICAL at 19:00

## 2023-01-07 RX ADMIN — OXYCODONE HYDROCHLORIDE 10 MILLIGRAM(S): 5 TABLET ORAL at 21:48

## 2023-01-07 RX ADMIN — OXYCODONE HYDROCHLORIDE 10 MILLIGRAM(S): 5 TABLET ORAL at 10:39

## 2023-01-07 RX ADMIN — LIDOCAINE 1 PATCH: 4 CREAM TOPICAL at 18:16

## 2023-01-07 RX ADMIN — GABAPENTIN 300 MILLIGRAM(S): 400 CAPSULE ORAL at 21:49

## 2023-01-07 RX ADMIN — SENNA PLUS 2 TABLET(S): 8.6 TABLET ORAL at 21:50

## 2023-01-07 RX ADMIN — AMLODIPINE BESYLATE 2.5 MILLIGRAM(S): 2.5 TABLET ORAL at 05:35

## 2023-01-07 RX ADMIN — ATORVASTATIN CALCIUM 10 MILLIGRAM(S): 80 TABLET, FILM COATED ORAL at 21:51

## 2023-01-07 RX ADMIN — Medication 650 MILLIGRAM(S): at 12:24

## 2023-01-07 RX ADMIN — LIDOCAINE 1 PATCH: 4 CREAM TOPICAL at 23:56

## 2023-01-07 RX ADMIN — OXYCODONE HYDROCHLORIDE 10 MILLIGRAM(S): 5 TABLET ORAL at 17:30

## 2023-01-07 RX ADMIN — LIDOCAINE 1 PATCH: 4 CREAM TOPICAL at 12:07

## 2023-01-07 RX ADMIN — Medication 3 MILLILITER(S): at 13:55

## 2023-01-07 RX ADMIN — OXYCODONE HYDROCHLORIDE 10 MILLIGRAM(S): 5 TABLET ORAL at 18:16

## 2023-01-07 RX ADMIN — OXYCODONE HYDROCHLORIDE 10 MILLIGRAM(S): 5 TABLET ORAL at 11:24

## 2023-01-07 RX ADMIN — Medication 650 MILLIGRAM(S): at 18:16

## 2023-01-07 RX ADMIN — GABAPENTIN 300 MILLIGRAM(S): 400 CAPSULE ORAL at 15:45

## 2023-01-07 RX ADMIN — ENOXAPARIN SODIUM 40 MILLIGRAM(S): 100 INJECTION SUBCUTANEOUS at 05:36

## 2023-01-07 RX ADMIN — GABAPENTIN 300 MILLIGRAM(S): 400 CAPSULE ORAL at 05:36

## 2023-01-07 RX ADMIN — OXYCODONE HYDROCHLORIDE 10 MILLIGRAM(S): 5 TABLET ORAL at 23:56

## 2023-01-07 RX ADMIN — Medication 100 MILLIGRAM(S): at 21:49

## 2023-01-07 RX ADMIN — Medication 650 MILLIGRAM(S): at 05:39

## 2023-01-07 RX ADMIN — Medication 25 MICROGRAM(S): at 05:36

## 2023-01-07 RX ADMIN — Medication 650 MILLIGRAM(S): at 05:36

## 2023-01-07 RX ADMIN — Medication 650 MILLIGRAM(S): at 17:31

## 2023-01-08 LAB
ALBUMIN SERPL ELPH-MCNC: 4 G/DL — SIGNIFICANT CHANGE UP (ref 3.5–5.2)
ALP SERPL-CCNC: 136 U/L — HIGH (ref 30–115)
ALT FLD-CCNC: 18 U/L — SIGNIFICANT CHANGE UP (ref 0–41)
ANION GAP SERPL CALC-SCNC: 13 MMOL/L — SIGNIFICANT CHANGE UP (ref 7–14)
AST SERPL-CCNC: 27 U/L — SIGNIFICANT CHANGE UP (ref 0–41)
BILIRUB SERPL-MCNC: 0.5 MG/DL — SIGNIFICANT CHANGE UP (ref 0.2–1.2)
BUN SERPL-MCNC: 14 MG/DL — SIGNIFICANT CHANGE UP (ref 10–20)
CALCIUM SERPL-MCNC: 9.2 MG/DL — SIGNIFICANT CHANGE UP (ref 8.4–10.5)
CHLORIDE SERPL-SCNC: 95 MMOL/L — LOW (ref 98–110)
CO2 SERPL-SCNC: 25 MMOL/L — SIGNIFICANT CHANGE UP (ref 17–32)
CREAT SERPL-MCNC: 0.9 MG/DL — SIGNIFICANT CHANGE UP (ref 0.7–1.5)
EGFR: 65 ML/MIN/1.73M2 — SIGNIFICANT CHANGE UP
GLUCOSE SERPL-MCNC: 118 MG/DL — HIGH (ref 70–99)
HCT VFR BLD CALC: 39.3 % — SIGNIFICANT CHANGE UP (ref 37–47)
HGB BLD-MCNC: 12.2 G/DL — SIGNIFICANT CHANGE UP (ref 12–16)
MCHC RBC-ENTMCNC: 30.3 PG — SIGNIFICANT CHANGE UP (ref 27–31)
MCHC RBC-ENTMCNC: 31 G/DL — LOW (ref 32–37)
MCV RBC AUTO: 97.5 FL — SIGNIFICANT CHANGE UP (ref 81–99)
NRBC # BLD: 0 /100 WBCS — SIGNIFICANT CHANGE UP (ref 0–0)
PLATELET # BLD AUTO: 226 K/UL — SIGNIFICANT CHANGE UP (ref 130–400)
POTASSIUM SERPL-MCNC: 4.4 MMOL/L — SIGNIFICANT CHANGE UP (ref 3.5–5)
POTASSIUM SERPL-SCNC: 4.4 MMOL/L — SIGNIFICANT CHANGE UP (ref 3.5–5)
PROT SERPL-MCNC: 6.3 G/DL — SIGNIFICANT CHANGE UP (ref 6–8)
RBC # BLD: 4.03 M/UL — LOW (ref 4.2–5.4)
RBC # FLD: 13.4 % — SIGNIFICANT CHANGE UP (ref 11.5–14.5)
SODIUM SERPL-SCNC: 133 MMOL/L — LOW (ref 135–146)
WBC # BLD: 12.49 K/UL — HIGH (ref 4.8–10.8)
WBC # FLD AUTO: 12.49 K/UL — HIGH (ref 4.8–10.8)

## 2023-01-08 RX ORDER — BUDESONIDE AND FORMOTEROL FUMARATE DIHYDRATE 160; 4.5 UG/1; UG/1
2 AEROSOL RESPIRATORY (INHALATION)
Refills: 0 | Status: DISCONTINUED | OUTPATIENT
Start: 2023-01-08 | End: 2023-01-14

## 2023-01-08 RX ORDER — TIOTROPIUM BROMIDE 18 UG/1
2 CAPSULE ORAL; RESPIRATORY (INHALATION) DAILY
Refills: 0 | Status: DISCONTINUED | OUTPATIENT
Start: 2023-01-08 | End: 2023-01-14

## 2023-01-08 RX ADMIN — Medication 650 MILLIGRAM(S): at 17:01

## 2023-01-08 RX ADMIN — GABAPENTIN 300 MILLIGRAM(S): 400 CAPSULE ORAL at 22:20

## 2023-01-08 RX ADMIN — ENOXAPARIN SODIUM 40 MILLIGRAM(S): 100 INJECTION SUBCUTANEOUS at 05:26

## 2023-01-08 RX ADMIN — OXYCODONE HYDROCHLORIDE 10 MILLIGRAM(S): 5 TABLET ORAL at 18:00

## 2023-01-08 RX ADMIN — GABAPENTIN 300 MILLIGRAM(S): 400 CAPSULE ORAL at 13:49

## 2023-01-08 RX ADMIN — Medication 650 MILLIGRAM(S): at 05:25

## 2023-01-08 RX ADMIN — ATORVASTATIN CALCIUM 10 MILLIGRAM(S): 80 TABLET, FILM COATED ORAL at 22:20

## 2023-01-08 RX ADMIN — OXYCODONE HYDROCHLORIDE 10 MILLIGRAM(S): 5 TABLET ORAL at 06:47

## 2023-01-08 RX ADMIN — OXYCODONE HYDROCHLORIDE 10 MILLIGRAM(S): 5 TABLET ORAL at 17:01

## 2023-01-08 RX ADMIN — Medication 650 MILLIGRAM(S): at 11:40

## 2023-01-08 RX ADMIN — Medication 650 MILLIGRAM(S): at 06:47

## 2023-01-08 RX ADMIN — TIOTROPIUM BROMIDE 2 PUFF(S): 18 CAPSULE ORAL; RESPIRATORY (INHALATION) at 12:38

## 2023-01-08 RX ADMIN — AMLODIPINE BESYLATE 2.5 MILLIGRAM(S): 2.5 TABLET ORAL at 05:26

## 2023-01-08 RX ADMIN — OXYCODONE HYDROCHLORIDE 10 MILLIGRAM(S): 5 TABLET ORAL at 11:19

## 2023-01-08 RX ADMIN — OXYCODONE HYDROCHLORIDE 10 MILLIGRAM(S): 5 TABLET ORAL at 11:41

## 2023-01-08 RX ADMIN — GABAPENTIN 300 MILLIGRAM(S): 400 CAPSULE ORAL at 05:25

## 2023-01-08 RX ADMIN — SENNA PLUS 2 TABLET(S): 8.6 TABLET ORAL at 22:20

## 2023-01-08 RX ADMIN — LIDOCAINE 1 PATCH: 4 CREAM TOPICAL at 11:19

## 2023-01-08 RX ADMIN — Medication 650 MILLIGRAM(S): at 11:19

## 2023-01-08 RX ADMIN — OXYCODONE HYDROCHLORIDE 10 MILLIGRAM(S): 5 TABLET ORAL at 05:25

## 2023-01-08 RX ADMIN — Medication 650 MILLIGRAM(S): at 18:00

## 2023-01-08 RX ADMIN — LIDOCAINE 1 PATCH: 4 CREAM TOPICAL at 18:00

## 2023-01-08 RX ADMIN — Medication 25 MICROGRAM(S): at 05:26

## 2023-01-09 RX ORDER — TIOTROPIUM BROMIDE 18 UG/1
2 CAPSULE ORAL; RESPIRATORY (INHALATION)
Qty: 0 | Refills: 0 | DISCHARGE
Start: 2023-01-09

## 2023-01-09 RX ORDER — BUDESONIDE AND FORMOTEROL FUMARATE DIHYDRATE 160; 4.5 UG/1; UG/1
2 AEROSOL RESPIRATORY (INHALATION)
Qty: 0 | Refills: 0 | DISCHARGE
Start: 2023-01-09

## 2023-01-09 RX ADMIN — TIOTROPIUM BROMIDE 2 PUFF(S): 18 CAPSULE ORAL; RESPIRATORY (INHALATION) at 07:43

## 2023-01-09 RX ADMIN — GABAPENTIN 300 MILLIGRAM(S): 400 CAPSULE ORAL at 14:00

## 2023-01-09 RX ADMIN — Medication 650 MILLIGRAM(S): at 17:04

## 2023-01-09 RX ADMIN — ATORVASTATIN CALCIUM 10 MILLIGRAM(S): 80 TABLET, FILM COATED ORAL at 21:57

## 2023-01-09 RX ADMIN — Medication 650 MILLIGRAM(S): at 05:25

## 2023-01-09 RX ADMIN — AMLODIPINE BESYLATE 2.5 MILLIGRAM(S): 2.5 TABLET ORAL at 05:27

## 2023-01-09 RX ADMIN — OXYCODONE HYDROCHLORIDE 20 MILLIGRAM(S): 5 TABLET ORAL at 04:37

## 2023-01-09 RX ADMIN — Medication 650 MILLIGRAM(S): at 05:58

## 2023-01-09 RX ADMIN — ENOXAPARIN SODIUM 40 MILLIGRAM(S): 100 INJECTION SUBCUTANEOUS at 05:26

## 2023-01-09 RX ADMIN — SENNA PLUS 2 TABLET(S): 8.6 TABLET ORAL at 21:58

## 2023-01-09 RX ADMIN — LIDOCAINE 1 PATCH: 4 CREAM TOPICAL at 23:20

## 2023-01-09 RX ADMIN — OXYCODONE HYDROCHLORIDE 20 MILLIGRAM(S): 5 TABLET ORAL at 20:07

## 2023-01-09 RX ADMIN — BUDESONIDE AND FORMOTEROL FUMARATE DIHYDRATE 2 PUFF(S): 160; 4.5 AEROSOL RESPIRATORY (INHALATION) at 20:08

## 2023-01-09 RX ADMIN — LIDOCAINE 1 PATCH: 4 CREAM TOPICAL at 20:13

## 2023-01-09 RX ADMIN — OXYCODONE HYDROCHLORIDE 20 MILLIGRAM(S): 5 TABLET ORAL at 11:33

## 2023-01-09 RX ADMIN — GABAPENTIN 300 MILLIGRAM(S): 400 CAPSULE ORAL at 21:57

## 2023-01-09 RX ADMIN — Medication 650 MILLIGRAM(S): at 11:36

## 2023-01-09 RX ADMIN — POLYETHYLENE GLYCOL 3350 17 GRAM(S): 17 POWDER, FOR SOLUTION ORAL at 11:37

## 2023-01-09 RX ADMIN — LIDOCAINE 1 PATCH: 4 CREAM TOPICAL at 11:37

## 2023-01-09 RX ADMIN — OXYCODONE HYDROCHLORIDE 20 MILLIGRAM(S): 5 TABLET ORAL at 12:03

## 2023-01-09 RX ADMIN — Medication 650 MILLIGRAM(S): at 12:05

## 2023-01-09 RX ADMIN — Medication 25 MICROGRAM(S): at 05:25

## 2023-01-09 RX ADMIN — BUDESONIDE AND FORMOTEROL FUMARATE DIHYDRATE 2 PUFF(S): 160; 4.5 AEROSOL RESPIRATORY (INHALATION) at 09:31

## 2023-01-09 RX ADMIN — GABAPENTIN 300 MILLIGRAM(S): 400 CAPSULE ORAL at 05:26

## 2023-01-09 RX ADMIN — OXYCODONE HYDROCHLORIDE 20 MILLIGRAM(S): 5 TABLET ORAL at 20:37

## 2023-01-09 RX ADMIN — OXYCODONE HYDROCHLORIDE 20 MILLIGRAM(S): 5 TABLET ORAL at 03:57

## 2023-01-10 ENCOUNTER — APPOINTMENT (OUTPATIENT)
Dept: CARDIOLOGY | Facility: CLINIC | Age: 83
End: 2023-01-10

## 2023-01-10 RX ADMIN — OXYCODONE HYDROCHLORIDE 20 MILLIGRAM(S): 5 TABLET ORAL at 17:31

## 2023-01-10 RX ADMIN — OXYCODONE HYDROCHLORIDE 20 MILLIGRAM(S): 5 TABLET ORAL at 02:10

## 2023-01-10 RX ADMIN — Medication 100 MILLIGRAM(S): at 06:52

## 2023-01-10 RX ADMIN — GABAPENTIN 300 MILLIGRAM(S): 400 CAPSULE ORAL at 13:50

## 2023-01-10 RX ADMIN — OXYCODONE HYDROCHLORIDE 20 MILLIGRAM(S): 5 TABLET ORAL at 23:31

## 2023-01-10 RX ADMIN — Medication 25 MICROGRAM(S): at 05:19

## 2023-01-10 RX ADMIN — ENOXAPARIN SODIUM 40 MILLIGRAM(S): 100 INJECTION SUBCUTANEOUS at 05:19

## 2023-01-10 RX ADMIN — GABAPENTIN 300 MILLIGRAM(S): 400 CAPSULE ORAL at 22:05

## 2023-01-10 RX ADMIN — GABAPENTIN 300 MILLIGRAM(S): 400 CAPSULE ORAL at 05:19

## 2023-01-10 RX ADMIN — Medication 650 MILLIGRAM(S): at 05:19

## 2023-01-10 RX ADMIN — BUDESONIDE AND FORMOTEROL FUMARATE DIHYDRATE 2 PUFF(S): 160; 4.5 AEROSOL RESPIRATORY (INHALATION) at 13:47

## 2023-01-10 RX ADMIN — AMLODIPINE BESYLATE 2.5 MILLIGRAM(S): 2.5 TABLET ORAL at 05:19

## 2023-01-10 RX ADMIN — OXYCODONE HYDROCHLORIDE 20 MILLIGRAM(S): 5 TABLET ORAL at 08:32

## 2023-01-10 RX ADMIN — OXYCODONE HYDROCHLORIDE 20 MILLIGRAM(S): 5 TABLET ORAL at 02:40

## 2023-01-10 RX ADMIN — SENNA PLUS 2 TABLET(S): 8.6 TABLET ORAL at 22:06

## 2023-01-10 RX ADMIN — ATORVASTATIN CALCIUM 10 MILLIGRAM(S): 80 TABLET, FILM COATED ORAL at 22:05

## 2023-01-10 RX ADMIN — BUDESONIDE AND FORMOTEROL FUMARATE DIHYDRATE 2 PUFF(S): 160; 4.5 AEROSOL RESPIRATORY (INHALATION) at 20:15

## 2023-01-10 RX ADMIN — Medication 650 MILLIGRAM(S): at 05:35

## 2023-01-10 RX ADMIN — Medication 650 MILLIGRAM(S): at 17:58

## 2023-01-10 RX ADMIN — Medication 650 MILLIGRAM(S): at 11:06

## 2023-01-10 NOTE — CHART NOTE - NSCHARTNOTEFT_GEN_A_CORE
Despite IV steroids and bronchodilators patient desaturates to:    - Room air pulse ox. at rest: 88%    - Pulse ox while ambulating on 2liters O2: 92%    Patient will require home O2 for discharge.  Patient is aware and agreeable to home O2.  Patient is in a chronic stable state of COPD.      Patient treated for pneumonia: Y    Pneumonia treated and resolved: Y

## 2023-01-10 NOTE — CHART NOTE - NSCHARTNOTEFT_GEN_A_CORE
pt seen in bed, alert, comfortable, NAD  A/P:   #COPD  - continue current tx  - despite IV steroids and bronchodilators patient desaturates to:    - Room air pulse ox. at rest: 88%    - Pulse ox while ambulating on 2liters O2: 92%    -  Patient is in a chronic stable state of COPD.    - Patient will require home O2 for discharge.  Patient is aware and agreeable to home O2.     - monitor vss    - monitor pt

## 2023-01-11 RX ORDER — OXYCODONE HYDROCHLORIDE 5 MG/1
20 TABLET ORAL EVERY 4 HOURS
Refills: 0 | Status: DISCONTINUED | OUTPATIENT
Start: 2023-01-11 | End: 2023-01-14

## 2023-01-11 RX ADMIN — Medication 650 MILLIGRAM(S): at 05:34

## 2023-01-11 RX ADMIN — OXYCODONE HYDROCHLORIDE 20 MILLIGRAM(S): 5 TABLET ORAL at 16:02

## 2023-01-11 RX ADMIN — BUDESONIDE AND FORMOTEROL FUMARATE DIHYDRATE 2 PUFF(S): 160; 4.5 AEROSOL RESPIRATORY (INHALATION) at 08:06

## 2023-01-11 RX ADMIN — AMLODIPINE BESYLATE 2.5 MILLIGRAM(S): 2.5 TABLET ORAL at 05:35

## 2023-01-11 RX ADMIN — LIDOCAINE 1 PATCH: 4 CREAM TOPICAL at 11:57

## 2023-01-11 RX ADMIN — GABAPENTIN 300 MILLIGRAM(S): 400 CAPSULE ORAL at 21:25

## 2023-01-11 RX ADMIN — GABAPENTIN 300 MILLIGRAM(S): 400 CAPSULE ORAL at 05:35

## 2023-01-11 RX ADMIN — Medication 100 MILLIGRAM(S): at 18:36

## 2023-01-11 RX ADMIN — Medication 650 MILLIGRAM(S): at 00:34

## 2023-01-11 RX ADMIN — GABAPENTIN 300 MILLIGRAM(S): 400 CAPSULE ORAL at 14:57

## 2023-01-11 RX ADMIN — ENOXAPARIN SODIUM 40 MILLIGRAM(S): 100 INJECTION SUBCUTANEOUS at 05:35

## 2023-01-11 RX ADMIN — Medication 650 MILLIGRAM(S): at 17:32

## 2023-01-11 RX ADMIN — Medication 650 MILLIGRAM(S): at 11:58

## 2023-01-11 RX ADMIN — ONDANSETRON 4 MILLIGRAM(S): 8 TABLET, FILM COATED ORAL at 21:15

## 2023-01-11 RX ADMIN — Medication 650 MILLIGRAM(S): at 06:24

## 2023-01-11 RX ADMIN — ATORVASTATIN CALCIUM 10 MILLIGRAM(S): 80 TABLET, FILM COATED ORAL at 21:25

## 2023-01-11 RX ADMIN — OXYCODONE HYDROCHLORIDE 20 MILLIGRAM(S): 5 TABLET ORAL at 08:38

## 2023-01-11 RX ADMIN — Medication 25 MICROGRAM(S): at 05:35

## 2023-01-11 RX ADMIN — LIDOCAINE 1 PATCH: 4 CREAM TOPICAL at 21:25

## 2023-01-11 RX ADMIN — OXYCODONE HYDROCHLORIDE 20 MILLIGRAM(S): 5 TABLET ORAL at 09:24

## 2023-01-11 RX ADMIN — Medication 650 MILLIGRAM(S): at 18:46

## 2023-01-11 RX ADMIN — Medication 650 MILLIGRAM(S): at 14:37

## 2023-01-12 RX ADMIN — GABAPENTIN 300 MILLIGRAM(S): 400 CAPSULE ORAL at 21:15

## 2023-01-12 RX ADMIN — OXYCODONE HYDROCHLORIDE 20 MILLIGRAM(S): 5 TABLET ORAL at 10:13

## 2023-01-12 RX ADMIN — OXYCODONE HYDROCHLORIDE 20 MILLIGRAM(S): 5 TABLET ORAL at 11:13

## 2023-01-12 RX ADMIN — GABAPENTIN 300 MILLIGRAM(S): 400 CAPSULE ORAL at 05:36

## 2023-01-12 RX ADMIN — Medication 650 MILLIGRAM(S): at 11:40

## 2023-01-12 RX ADMIN — GABAPENTIN 300 MILLIGRAM(S): 400 CAPSULE ORAL at 14:32

## 2023-01-12 RX ADMIN — ENOXAPARIN SODIUM 40 MILLIGRAM(S): 100 INJECTION SUBCUTANEOUS at 05:36

## 2023-01-12 RX ADMIN — Medication 100 MILLIGRAM(S): at 10:14

## 2023-01-12 RX ADMIN — Medication 10 MILLILITER(S): at 00:06

## 2023-01-12 RX ADMIN — OXYCODONE HYDROCHLORIDE 20 MILLIGRAM(S): 5 TABLET ORAL at 18:10

## 2023-01-12 RX ADMIN — AMLODIPINE BESYLATE 2.5 MILLIGRAM(S): 2.5 TABLET ORAL at 05:36

## 2023-01-12 RX ADMIN — OXYCODONE HYDROCHLORIDE 20 MILLIGRAM(S): 5 TABLET ORAL at 03:28

## 2023-01-12 RX ADMIN — Medication 100 MILLIGRAM(S): at 21:11

## 2023-01-12 RX ADMIN — OXYCODONE HYDROCHLORIDE 20 MILLIGRAM(S): 5 TABLET ORAL at 00:09

## 2023-01-12 RX ADMIN — TIOTROPIUM BROMIDE 2 PUFF(S): 18 CAPSULE ORAL; RESPIRATORY (INHALATION) at 10:14

## 2023-01-12 RX ADMIN — Medication 650 MILLIGRAM(S): at 05:36

## 2023-01-12 RX ADMIN — ATORVASTATIN CALCIUM 10 MILLIGRAM(S): 80 TABLET, FILM COATED ORAL at 21:17

## 2023-01-12 RX ADMIN — OXYCODONE HYDROCHLORIDE 20 MILLIGRAM(S): 5 TABLET ORAL at 19:18

## 2023-01-12 RX ADMIN — Medication 650 MILLIGRAM(S): at 18:09

## 2023-01-12 RX ADMIN — OXYCODONE HYDROCHLORIDE 20 MILLIGRAM(S): 5 TABLET ORAL at 21:13

## 2023-01-12 RX ADMIN — OXYCODONE HYDROCHLORIDE 20 MILLIGRAM(S): 5 TABLET ORAL at 21:19

## 2023-01-12 RX ADMIN — Medication 25 MICROGRAM(S): at 05:36

## 2023-01-12 RX ADMIN — Medication 650 MILLIGRAM(S): at 05:40

## 2023-01-12 RX ADMIN — BUDESONIDE AND FORMOTEROL FUMARATE DIHYDRATE 2 PUFF(S): 160; 4.5 AEROSOL RESPIRATORY (INHALATION) at 10:12

## 2023-01-13 ENCOUNTER — TRANSCRIPTION ENCOUNTER (OUTPATIENT)
Age: 83
End: 2023-01-13

## 2023-01-13 RX ORDER — OXYCODONE HYDROCHLORIDE 5 MG/1
1 TABLET ORAL
Qty: 42 | Refills: 0
Start: 2023-01-13 | End: 2023-01-19

## 2023-01-13 RX ORDER — TIOTROPIUM BROMIDE 18 UG/1
2 CAPSULE ORAL; RESPIRATORY (INHALATION)
Qty: 1 | Refills: 0
Start: 2023-01-13 | End: 2023-02-11

## 2023-01-13 RX ORDER — BUDESONIDE AND FORMOTEROL FUMARATE DIHYDRATE 160; 4.5 UG/1; UG/1
2 AEROSOL RESPIRATORY (INHALATION)
Qty: 1 | Refills: 0
Start: 2023-01-13 | End: 2023-02-11

## 2023-01-13 RX ORDER — GABAPENTIN 400 MG/1
1 CAPSULE ORAL
Qty: 90 | Refills: 0
Start: 2023-01-13 | End: 2023-02-11

## 2023-01-13 RX ORDER — OXYCODONE HYDROCHLORIDE 5 MG/1
1 TABLET ORAL
Qty: 24 | Refills: 0
Start: 2023-01-13 | End: 2023-01-16

## 2023-01-13 RX ORDER — OXYCODONE HYDROCHLORIDE 5 MG/1
1 TABLET ORAL
Qty: 24 | Refills: 0
Start: 2023-01-13 | End: 2023-01-17

## 2023-01-13 RX ADMIN — AMLODIPINE BESYLATE 2.5 MILLIGRAM(S): 2.5 TABLET ORAL at 05:35

## 2023-01-13 RX ADMIN — BUDESONIDE AND FORMOTEROL FUMARATE DIHYDRATE 2 PUFF(S): 160; 4.5 AEROSOL RESPIRATORY (INHALATION) at 21:54

## 2023-01-13 RX ADMIN — Medication 650 MILLIGRAM(S): at 05:36

## 2023-01-13 RX ADMIN — SENNA PLUS 2 TABLET(S): 8.6 TABLET ORAL at 21:51

## 2023-01-13 RX ADMIN — BUDESONIDE AND FORMOTEROL FUMARATE DIHYDRATE 2 PUFF(S): 160; 4.5 AEROSOL RESPIRATORY (INHALATION) at 07:49

## 2023-01-13 RX ADMIN — Medication 650 MILLIGRAM(S): at 11:33

## 2023-01-13 RX ADMIN — OXYCODONE HYDROCHLORIDE 20 MILLIGRAM(S): 5 TABLET ORAL at 12:30

## 2023-01-13 RX ADMIN — GABAPENTIN 300 MILLIGRAM(S): 400 CAPSULE ORAL at 14:48

## 2023-01-13 RX ADMIN — ALBUTEROL 1 PUFF(S): 90 AEROSOL, METERED ORAL at 23:24

## 2023-01-13 RX ADMIN — Medication 100 MILLIGRAM(S): at 11:43

## 2023-01-13 RX ADMIN — ENOXAPARIN SODIUM 40 MILLIGRAM(S): 100 INJECTION SUBCUTANEOUS at 05:36

## 2023-01-13 RX ADMIN — ATORVASTATIN CALCIUM 10 MILLIGRAM(S): 80 TABLET, FILM COATED ORAL at 21:52

## 2023-01-13 RX ADMIN — TIOTROPIUM BROMIDE 2 PUFF(S): 18 CAPSULE ORAL; RESPIRATORY (INHALATION) at 07:49

## 2023-01-13 RX ADMIN — Medication 25 MICROGRAM(S): at 05:35

## 2023-01-13 RX ADMIN — GABAPENTIN 300 MILLIGRAM(S): 400 CAPSULE ORAL at 21:52

## 2023-01-13 RX ADMIN — GABAPENTIN 300 MILLIGRAM(S): 400 CAPSULE ORAL at 05:35

## 2023-01-13 RX ADMIN — Medication 650 MILLIGRAM(S): at 05:35

## 2023-01-13 RX ADMIN — OXYCODONE HYDROCHLORIDE 20 MILLIGRAM(S): 5 TABLET ORAL at 11:36

## 2023-01-13 NOTE — DISCHARGE NOTE NURSING/CASE MANAGEMENT/SOCIAL WORK - PATIENT PORTAL LINK FT
You can access the FollowMyHealth Patient Portal offered by [facility] by registering at the following website: [email or web address]. By joining [employer]’s FollowMyHealth portal, you will also be able to view your health information using other applications (apps) compatible with our system.

## 2023-01-13 NOTE — DISCHARGE NOTE NURSING/CASE MANAGEMENT/SOCIAL WORK - WAS YOUR LAST COVID-19 VACCINE GREATER THAN OR EQUAL TO TWO MONTHS AGO?
It would be eneficial if we could get stool studies on him to include culture, C. difficile Roto and O&P. However, in the meantime, we can send in some Cipro 500 mg p.o. twice daily for 7 days. If his symptoms get worse, he is going had to come in to be seen. However, I would recommend that he come in and give us a stool studies to be sure that he does not have an infectious process going on like C. difficile toxin can call him in Lomotil 1 tablet every 6 hours as needed as needed for diarrhea.   Call in 10 tablets Yes

## 2023-01-14 VITALS
RESPIRATION RATE: 18 BRPM | HEART RATE: 89 BPM | TEMPERATURE: 99 F | DIASTOLIC BLOOD PRESSURE: 74 MMHG | SYSTOLIC BLOOD PRESSURE: 150 MMHG

## 2023-01-14 RX ORDER — METOCLOPRAMIDE HCL 10 MG
10 TABLET ORAL EVERY 6 HOURS
Refills: 0 | Status: DISCONTINUED | OUTPATIENT
Start: 2023-01-14 | End: 2023-01-14

## 2023-01-14 RX ORDER — METOCLOPRAMIDE HCL 10 MG
10 TABLET ORAL ONCE
Refills: 0 | Status: COMPLETED | OUTPATIENT
Start: 2023-01-14 | End: 2023-01-14

## 2023-01-14 RX ORDER — FLUTICASONE PROPIONATE 50 MCG
1 SPRAY, SUSPENSION NASAL
Refills: 0 | Status: DISCONTINUED | OUTPATIENT
Start: 2023-01-14 | End: 2023-01-14

## 2023-01-14 RX ORDER — TEMAZEPAM 15 MG/1
15 CAPSULE ORAL AT BEDTIME
Refills: 0 | Status: DISCONTINUED | OUTPATIENT
Start: 2023-01-14 | End: 2023-01-14

## 2023-01-14 RX ADMIN — Medication 650 MILLIGRAM(S): at 06:02

## 2023-01-14 RX ADMIN — Medication 650 MILLIGRAM(S): at 00:53

## 2023-01-14 RX ADMIN — Medication 650 MILLIGRAM(S): at 07:37

## 2023-01-14 RX ADMIN — OXYCODONE HYDROCHLORIDE 20 MILLIGRAM(S): 5 TABLET ORAL at 00:53

## 2023-01-14 RX ADMIN — Medication 650 MILLIGRAM(S): at 11:20

## 2023-01-14 RX ADMIN — Medication 650 MILLIGRAM(S): at 01:00

## 2023-01-14 RX ADMIN — TIOTROPIUM BROMIDE 2 PUFF(S): 18 CAPSULE ORAL; RESPIRATORY (INHALATION) at 09:51

## 2023-01-14 RX ADMIN — BUDESONIDE AND FORMOTEROL FUMARATE DIHYDRATE 2 PUFF(S): 160; 4.5 AEROSOL RESPIRATORY (INHALATION) at 09:51

## 2023-01-14 RX ADMIN — Medication 25 MICROGRAM(S): at 06:02

## 2023-01-14 RX ADMIN — OXYCODONE HYDROCHLORIDE 20 MILLIGRAM(S): 5 TABLET ORAL at 06:03

## 2023-01-14 RX ADMIN — LIDOCAINE 1 PATCH: 4 CREAM TOPICAL at 11:35

## 2023-01-14 RX ADMIN — AMLODIPINE BESYLATE 2.5 MILLIGRAM(S): 2.5 TABLET ORAL at 06:03

## 2023-01-14 RX ADMIN — Medication 1 SPRAY(S): at 04:37

## 2023-01-14 RX ADMIN — Medication 10 MILLIGRAM(S): at 00:53

## 2023-01-14 RX ADMIN — ENOXAPARIN SODIUM 40 MILLIGRAM(S): 100 INJECTION SUBCUTANEOUS at 06:01

## 2023-01-14 RX ADMIN — OXYCODONE HYDROCHLORIDE 20 MILLIGRAM(S): 5 TABLET ORAL at 07:38

## 2023-01-14 RX ADMIN — OXYCODONE HYDROCHLORIDE 20 MILLIGRAM(S): 5 TABLET ORAL at 01:00

## 2023-01-14 RX ADMIN — OXYCODONE HYDROCHLORIDE 20 MILLIGRAM(S): 5 TABLET ORAL at 09:55

## 2023-01-14 RX ADMIN — OXYCODONE HYDROCHLORIDE 20 MILLIGRAM(S): 5 TABLET ORAL at 10:30

## 2023-01-14 RX ADMIN — GABAPENTIN 300 MILLIGRAM(S): 400 CAPSULE ORAL at 06:04

## 2023-01-14 NOTE — PROGRESS NOTE ADULT - PROVIDER SPECIALTY LIST ADULT
Hospitalist
Hospitalist
Internal Medicine
Pulmonology
Hospitalist
Internal Medicine
Hospitalist
Internal Medicine
Pulmonology

## 2023-01-14 NOTE — PROGRESS NOTE ADULT - SUBJECTIVE AND OBJECTIVE BOX
82y F pmhx of DLD, hypothyroid, chronic back pain, hx cerebral aneurysm s/p coiling (2010) p/w upper back pain and SOB.  Pt states that for months she has been having SOB on exertion, she had a cardiac work up with no clear explantation for her dyspnea. She states it has progressively worsened acutely a few days ago.    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 650 milliGRAM(s) Oral every 6 hours  amLODIPine   Tablet 2.5 milliGRAM(s) Oral daily  atorvastatin 10 milliGRAM(s) Oral at bedtime  enoxaparin Injectable 40 milliGRAM(s) SubCutaneous every 24 hours  gabapentin 300 milliGRAM(s) Oral three times a day  levothyroxine 25 MICROGram(s) Oral daily  lidocaine   4% Patch 1 Patch Transdermal daily  polyethylene glycol 3350 17 Gram(s) Oral daily  senna 2 Tablet(s) Oral at bedtime    MEDICATIONS  (PRN):  albuterol    90 MICROgram(s) HFA Inhaler 1 Puff(s) Inhalation every 6 hours PRN Shortness of Breath  albuterol/ipratropium for Nebulization 3 milliLiter(s) Nebulizer every 6 hours PRN Shortness of Breath  benzonatate 100 milliGRAM(s) Oral three times a day PRN Cough  guaifenesin/dextromethorphan Oral Liquid 10 milliLiter(s) Oral every 6 hours PRN Cough  ondansetron Injectable 4 milliGRAM(s) IV Push four times a day PRN Nausea and/or Vomiting  oxyCODONE    IR 10 milliGRAM(s) Oral every 4 hours PRN Moderate Pain (4 - 6)  oxyCODONE    IR 20 milliGRAM(s) Oral every 4 hours PRN Severe Pain (7 - 10)      Allergies  Compazine (Unknown)    Vital Signs Last 24 Hrs  T(C): 35.8 (06 Jan 2023 04:08), Max: 35.9 (05 Jan 2023 14:00)  T(F): 96.4 (06 Jan 2023 04:08), Max: 96.7 (05 Jan 2023 21:09)  HR: 95 (06 Jan 2023 04:08) (95 - 99)  BP: 132/68 (06 Jan 2023 04:08) (120/68 - 132/68)  BP(mean): --  RR: 16 (06 Jan 2023 04:08) (16 - 18)  SpO2: --        PHYSICAL EXAM:    GENERAL : AAOX3, NAD, obesity   HEENT: PERRLA, EOMI  NECK: no JVD, no thyromegaly   CVS: S1 S2 no murmur no rubs no gallop  RESP:  diffuse decreased breath sounds and wheeze, no rhonchi no rales  ABD: Soft, NT, ND, tympanic, no rebound or guarding   : No Barillas, No CVA tenderness   EXT; no pedal edema, no cyanosis  MSK: No ML spinal tenderness, normal ROM   NEURO: AAOX3, no new focal deficits      LABS:                        14.1   14.84 )-----------( 276      ( 03 Jan 2023 09:42 )             44.7     01-03    138  |  99  |  19  ----------------------------<  154<H>  4.8   |  27  |  0.8    Ca    9.4      03 Jan 2023 09:42    TPro  6.7  /  Alb  4.1  /  TBili  0.6  /  DBili  x   /  AST  15  /  ALT  20  /  AlkPhos  96  01-03  
82y F pmhx of DLD, hypothyroid, chronic back pain, hx cerebral aneurysm s/p coiling (2010) p/w upper back pain and SOB.  Pt states that for months she has been having SOB on exertion, she had a cardiac work up with no clear explantation for her dyspnea. She states it has progressively worsened acutely a few days ago.    Today:  Seen at bedside, no new complaints        REVIEW OF SYSTEMS:  No new complaints      MEDICATIONS  (STANDING):  amLODIPine   Tablet 2.5 milliGRAM(s) Oral daily  atorvastatin 10 milliGRAM(s) Oral at bedtime  enoxaparin Injectable 40 milliGRAM(s) SubCutaneous every 24 hours  levothyroxine 25 MICROGram(s) Oral daily  predniSONE   Tablet 60 milliGRAM(s) Oral daily    MEDICATIONS  (PRN):  acetaminophen     Tablet .. 650 milliGRAM(s) Oral every 6 hours PRN Temp greater or equal to 38C (100.4F), Mild Pain (1 - 3)  albuterol    90 MICROgram(s) HFA Inhaler 1 Puff(s) Inhalation every 6 hours PRN Shortness of Breath  albuterol/ipratropium for Nebulization 3 milliLiter(s) Nebulizer every 6 hours PRN Shortness of Breath  morphine  - Injectable 4 milliGRAM(s) IV Push every 4 hours PRN Severe Pain (7 - 10)  traMADol 50 milliGRAM(s) Oral every 4 hours PRN Moderate Pain (4 - 6)  zolpidem 5 milliGRAM(s) Oral at bedtime PRN Insomnia      Allergies  Compazine (Unknown)          Vital Signs Last 24 Hrs  T(C): 37.2 (28 Dec 2022 13:00), Max: 37.2 (28 Dec 2022 13:00)  T(F): 99 (28 Dec 2022 13:00), Max: 99 (28 Dec 2022 13:00)  HR: 121 (28 Dec 2022 13:00) (113 - 121)  BP: 176/78 (28 Dec 2022 13:00) (141/99 - 176/78)  RR: 16 (28 Dec 2022 13:00) (16 - 16)          PHYSICAL EXAM:  GA : AAOX3, NAD, obesity   HEENT: PERRLA, EOMI  NECK: no JVD, no thyromegaly   CVS: S1 S2 no murmur no rubs no gallop  RESP:  diffuse decreased breath sounds and wheeze, no rhonchi no rales  ABD: Soft, NT, ND, tympanic, no rebound or guarding   : No Barillas, No CVA tenderness   EXT; no pedal edema, no cyanosis  MSK: No ML spinal tenderness, normal ROM   NEURO: AAOX3, no new focal deficits       LABS:                        12.8   8.89  )-----------( 216      ( 28 Dec 2022 09:00 )             40.7     12-28    138  |  100  |  18  ----------------------------<  226<H>  5.1<H>   |  23  |  0.8    Ca    9.3      28 Dec 2022 09:00        Urinalysis Basic - ( 28 Dec 2022 11:03 )    Color: Yellow / Appearance: Clear / SG: >=1.030 / pH: x  Gluc: x / Ketone: 40  / Bili: Negative / Urobili: 0.2 mg/dL   Blood: x / Protein: 30 mg/dL / Nitrite: Negative   Leuk Esterase: Negative / RBC: 3-5 /HPF / WBC 1-2 /HPF   Sq Epi: x / Non Sq Epi: Occasional /HPF / Bacteria: Few      
82y F pmhx of DLD, hypothyroid, chronic back pain, hx cerebral aneurysm s/p coiling (2010) p/w upper back pain and SOB.  Pt states that for months she has been having SOB on exertion, she had a cardiac work up with no clear explantation for her dyspnea. She states it has progressively worsened acutely a few days ago.    Today:  Seen at bedside, states her Back pain is better.        REVIEW OF SYSTEMS:  No new complaints      MEDICATIONS  (STANDING):  acetaminophen     Tablet .. 650 milliGRAM(s) Oral every 6 hours  amLODIPine   Tablet 2.5 milliGRAM(s) Oral daily  atorvastatin 10 milliGRAM(s) Oral at bedtime  enoxaparin Injectable 40 milliGRAM(s) SubCutaneous every 24 hours  gabapentin 300 milliGRAM(s) Oral three times a day  levothyroxine 25 MICROGram(s) Oral daily  lidocaine   4% Patch 1 Patch Transdermal daily  polyethylene glycol 3350 17 Gram(s) Oral daily  senna 2 Tablet(s) Oral at bedtime    MEDICATIONS  (PRN):  albuterol    90 MICROgram(s) HFA Inhaler 1 Puff(s) Inhalation every 6 hours PRN Shortness of Breath  albuterol/ipratropium for Nebulization 3 milliLiter(s) Nebulizer every 6 hours PRN Shortness of Breath  benzonatate 100 milliGRAM(s) Oral three times a day PRN Cough  guaifenesin/dextromethorphan Oral Liquid 10 milliLiter(s) Oral every 6 hours PRN Cough  ondansetron Injectable 4 milliGRAM(s) IV Push four times a day PRN Nausea and/or Vomiting  oxyCODONE    IR 10 milliGRAM(s) Oral every 4 hours PRN Moderate Pain (4 - 6)  oxyCODONE    IR 20 milliGRAM(s) Oral every 4 hours PRN Severe Pain (7 - 10)  zolpidem 5 milliGRAM(s) Oral at bedtime PRN Insomnia      Allergies  Compazine (Unknown)          Vital Signs Last 24 Hrs  T(C): 36.1 (04 Jan 2023 14:15), Max: 36.2 (03 Jan 2023 21:01)  T(F): 97 (04 Jan 2023 14:15), Max: 97.2 (03 Jan 2023 21:01)  HR: 69 (04 Jan 2023 14:15) (69 - 95)  BP: 127/69 (04 Jan 2023 14:15) (127/69 - 177/82)  RR: 18 (04 Jan 2023 14:15) (18 - 18)  SpO2: 92% (04 Jan 2023 05:00) (92% - 93%)    Parameters below as of 03 Jan 2023 21:01  Patient On (Oxygen Delivery Method): room air        PHYSICAL EXAM:  GA : AAOX3, NAD, obesity   HEENT: PERRLA, EOMI  NECK: no JVD, no thyromegaly   CVS: S1 S2 no murmur no rubs no gallop  RESP:  diffuse decreased breath sounds and wheeze, no rhonchi no rales  ABD: Soft, NT, ND, tympanic, no rebound or guarding   : No Barillas, No CVA tenderness   EXT; no pedal edema, no cyanosis  MSK: No ML spinal tenderness, normal ROM   NEURO: AAOX3, no new focal deficits      LABS:                        14.1   14.84 )-----------( 276      ( 03 Jan 2023 09:42 )             44.7     01-03    138  |  99  |  19  ----------------------------<  154<H>  4.8   |  27  |  0.8    Ca    9.4      03 Jan 2023 09:42    TPro  6.7  /  Alb  4.1  /  TBili  0.6  /  DBili  x   /  AST  15  /  ALT  20  /  AlkPhos  96  01-03        
82y F pmhx of DLD, hypothyroid, chronic back pain, hx cerebral aneurysm s/p coiling (2010) p/w upper back pain and SOB.  Pt states that for months she has been having SOB on exertion, she had a cardiac work up with no clear explantation for her dyspnea. She states it has progressively worsened acutely a few days ago.    Today:  Seen at bedside, still complains of upper Back pain.          REVIEW OF SYSTEMS:  SOB, upper Back pain      MEDICATIONS  (STANDING):  amLODIPine   Tablet 2.5 milliGRAM(s) Oral daily  atorvastatin 10 milliGRAM(s) Oral at bedtime  enoxaparin Injectable 40 milliGRAM(s) SubCutaneous every 24 hours  levothyroxine 25 MICROGram(s) Oral daily  predniSONE   Tablet 60 milliGRAM(s) Oral daily    MEDICATIONS  (PRN):  acetaminophen     Tablet .. 650 milliGRAM(s) Oral every 6 hours PRN Temp greater or equal to 38C (100.4F), Mild Pain (1 - 3)  albuterol    90 MICROgram(s) HFA Inhaler 1 Puff(s) Inhalation every 6 hours PRN Shortness of Breath  albuterol/ipratropium for Nebulization 3 milliLiter(s) Nebulizer every 6 hours PRN Shortness of Breath  benzonatate 100 milliGRAM(s) Oral three times a day PRN Cough  morphine  - Injectable 4 milliGRAM(s) IV Push every 4 hours PRN Severe Pain (7 - 10)  traMADol 50 milliGRAM(s) Oral every 4 hours PRN Moderate Pain (4 - 6)  zolpidem 5 milliGRAM(s) Oral at bedtime PRN Insomnia  zolpidem 5 milliGRAM(s) Oral at bedtime PRN Insomnia      Allergies  Compazine (Unknown)        Vital Signs Last 24 Hrs  T(C): 37.3 (29 Dec 2022 13:53), Max: 37.3 (29 Dec 2022 13:53)  T(F): 99.1 (29 Dec 2022 13:53), Max: 99.1 (29 Dec 2022 13:53)  HR: 104 (29 Dec 2022 13:53) (104 - 122)  BP: 138/74 (29 Dec 2022 13:53) (138/74 - 185/99)  RR: 18 (29 Dec 2022 13:53) (16 - 18)  SpO2: 90% (29 Dec 2022 04:49) (90% - 90%)        PHYSICAL EXAM:  GA : AAOX3, NAD, obesity   HEENT: PERRLA, EOMI  NECK: no JVD, no thyromegaly   CVS: S1 S2 no murmur no rubs no gallop  RESP:  diffuse decreased breath sounds and wheeze, no rhonchi no rales  ABD: Soft, NT, ND, tympanic, no rebound or guarding   : No Barillas, No CVA tenderness   EXT; no pedal edema, no cyanosis  MSK: No ML spinal tenderness, normal ROM   NEURO: AAOX3, no new focal deficits      LABS:                        12.3   15.47 )-----------( 255      ( 29 Dec 2022 09:20 )             38.7     12-29    139  |  102  |  24<H>  ----------------------------<  175<H>  4.2   |  25  |  1.0    Ca    9.4      29 Dec 2022 09:20    TPro  6.3  /  Alb  4.3  /  TBili  0.6  /  DBili  x   /  AST  22  /  ALT  21  /  AlkPhos  96  12-29      Urinalysis Basic - ( 28 Dec 2022 11:03 )    Color: Yellow / Appearance: Clear / SG: >=1.030 / pH: x  Gluc: x / Ketone: 40  / Bili: Negative / Urobili: 0.2 mg/dL   Blood: x / Protein: 30 mg/dL / Nitrite: Negative   Leuk Esterase: Negative / RBC: 3-5 /HPF / WBC 1-2 /HPF   Sq Epi: x / Non Sq Epi: Occasional /HPF / Bacteria: Few      
82y F pmhx of DLD, hypothyroid, chronic back pain, hx cerebral aneurysm s/p coiling (2010) p/w upper back pain and SOB.  Pt states that for months she has been having SOB on exertion, she had a cardiac work up with no clear explantation for her dyspnea. She states it has progressively worsened acutely a few days ago.    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 650 milliGRAM(s) Oral every 6 hours  amLODIPine   Tablet 2.5 milliGRAM(s) Oral daily  atorvastatin 10 milliGRAM(s) Oral at bedtime  budesonide 160 MICROgram(s)/formoterol 4.5 MICROgram(s) Inhaler 2 Puff(s) Inhalation two times a day  enoxaparin Injectable 40 milliGRAM(s) SubCutaneous every 24 hours  gabapentin 300 milliGRAM(s) Oral three times a day  levothyroxine 25 MICROGram(s) Oral daily  lidocaine   4% Patch 1 Patch Transdermal daily  polyethylene glycol 3350 17 Gram(s) Oral daily  senna 2 Tablet(s) Oral at bedtime  tiotropium 2.5 MICROgram(s) Inhaler 2 Puff(s) Inhalation daily    MEDICATIONS  (PRN):  albuterol    90 MICROgram(s) HFA Inhaler 1 Puff(s) Inhalation every 6 hours PRN Shortness of Breath  albuterol/ipratropium for Nebulization 3 milliLiter(s) Nebulizer every 6 hours PRN Shortness of Breath  benzonatate 100 milliGRAM(s) Oral three times a day PRN Cough  guaifenesin/dextromethorphan Oral Liquid 10 milliLiter(s) Oral every 6 hours PRN Cough  ondansetron Injectable 4 milliGRAM(s) IV Push four times a day PRN Nausea and/or Vomiting  oxyCODONE    IR 10 milliGRAM(s) Oral every 4 hours PRN Moderate Pain (4 - 6)  oxyCODONE    IR 20 milliGRAM(s) Oral every 4 hours PRN Severe Pain (7 - 10)        Allergies  Compazine (Unknown)    Vital Signs Last 24 Hrs  T(C): 35.7 (10 Juanpablo 2023 04:51), Max: 35.7 (10 Juanpablo 2023 04:51)  T(F): 96.2 (10 Juanpablo 2023 04:51), Max: 96.2 (10 Juanpablo 2023 04:51)  HR: 95 (10 Juanpablo 2023 04:51) (86 - 116)  BP: 150/62 (10 Juanpablo 2023 04:51) (108/67 - 150/62)  BP(mean): --  RR: 18 (10 Juanpablo 2023 04:51) (16 - 18)  SpO2: 93% (09 Jan 2023 22:10) (78% - 93%)    Parameters below as of 09 Jan 2023 22:10  Patient On (Oxygen Delivery Method): nasal cannula  O2 Flow (L/min): 3      PHYSICAL EXAM:    GENERAL : AAOX3, NAD, obesity   HEENT: PERRLA, EOMI  NECK: no JVD, no thyromegaly   CVS: S1 S2 no murmur no rubs no gallop  RESP:  breath sounds and wheeze, no rhonchi no rales  ABD: Soft, NT, ND, tympanic, no rebound or guarding   : No Barillas, No CVA tenderness   EXT; no pedal edema, no cyanosis  MSK: No ML spinal tenderness, normal ROM   NEURO: AAOX3, no new focal deficits      LABS:                          12.2   12.49 )-----------( 226      ( 08 Jan 2023 12:16 )             39.3                           14.1   14.84 )-----------( 276      ( 03 Jan 2023 09:42 )             44.7     01-08    133<L>  |  95<L>  |  14  ----------------------------<  118<H>  4.4   |  25  |  0.9    Ca    9.2      08 Jan 2023 12:16    TPro  6.3  /  Alb  4.0  /  TBili  0.5  /  DBili  x   /  AST  27  /  ALT  18  /  AlkPhos  136<H>  01-08 01-03    138  |  99  |  19  ----------------------------<  154<H>  4.8   |  27  |  0.8    Ca    9.4      03 Jan 2023 09:42    TPro  6.7  /  Alb  4.1  /  TBili  0.6  /  DBili  x   /  AST  15  /  ALT  20  /  AlkPhos  96  01-03      
82y F pmhx of DLD, hypothyroid, chronic back pain, hx cerebral aneurysm s/p coiling (2010) p/w upper back pain and SOB.  Pt states that for months she has been having SOB on exertion, she had a cardiac work up with no clear explantation for her dyspnea. She states it has progressively worsened acutely a few days ago.    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 650 milliGRAM(s) Oral every 6 hours  amLODIPine   Tablet 2.5 milliGRAM(s) Oral daily  atorvastatin 10 milliGRAM(s) Oral at bedtime  budesonide 160 MICROgram(s)/formoterol 4.5 MICROgram(s) Inhaler 2 Puff(s) Inhalation two times a day  enoxaparin Injectable 40 milliGRAM(s) SubCutaneous every 24 hours  gabapentin 300 milliGRAM(s) Oral three times a day  levothyroxine 25 MICROGram(s) Oral daily  lidocaine   4% Patch 1 Patch Transdermal daily  polyethylene glycol 3350 17 Gram(s) Oral daily  senna 2 Tablet(s) Oral at bedtime  tiotropium 2.5 MICROgram(s) Inhaler 2 Puff(s) Inhalation daily    MEDICATIONS  (PRN):  albuterol    90 MICROgram(s) HFA Inhaler 1 Puff(s) Inhalation every 6 hours PRN Shortness of Breath  albuterol/ipratropium for Nebulization 3 milliLiter(s) Nebulizer every 6 hours PRN Shortness of Breath  benzonatate 100 milliGRAM(s) Oral three times a day PRN Cough  guaifenesin/dextromethorphan Oral Liquid 10 milliLiter(s) Oral every 6 hours PRN Cough  ondansetron Injectable 4 milliGRAM(s) IV Push four times a day PRN Nausea and/or Vomiting      Allergies  Compazine (Unknown)    Vital Signs Last 24 Hrs  T(C): 35.7 (12 Jan 2023 05:22), Max: 36 (11 Jan 2023 21:00)  T(F): 96.3 (12 Jan 2023 05:22), Max: 96.8 (11 Jan 2023 21:00)  HR: 127 (12 Jan 2023 05:22) (94 - 127)  BP: 132/90 (12 Jan 2023 05:22) (132/90 - 134/68)  BP(mean): --  RR: 18 (11 Jan 2023 21:00) (18 - 18)  SpO2: --        PHYSICAL EXAM:    GENERAL : AAOX3, NAD, obesity   HEENT: PERRLA, EOMI  NECK: no JVD, no thyromegaly   CVS: S1 S2 no murmur no rubs no gallop  RESP:  breath sounds and wheeze, no rhonchi no rales  ABD: Soft, NT, ND, tympanic, no rebound or guarding   : No Barillas, No CVA tenderness   EXT; no pedal edema, no cyanosis  MSK: No ML spinal tenderness, normal ROM   NEURO: AAOX3, no new focal deficits      LABS:                          12.2   12.49 )-----------( 226      ( 08 Jan 2023 12:16 )             39.3                           14.1   14.84 )-----------( 276      ( 03 Jan 2023 09:42 )             44.7     01-08    133<L>  |  95<L>  |  14  ----------------------------<  118<H>  4.4   |  25  |  0.9    Ca    9.2      08 Jan 2023 12:16    TPro  6.3  /  Alb  4.0  /  TBili  0.5  /  DBili  x   /  AST  27  /  ALT  18  /  AlkPhos  136<H>  01-08      01-03    138  |  99  |  19  ----------------------------<  154<H>  4.8   |  27  |  0.8    Ca    9.4      03 Jan 2023 09:42    TPro  6.7  /  Alb  4.1  /  TBili  0.6  /  DBili  x   /  AST  15  /  ALT  20  /  AlkPhos  96  01-03    
82y F pmhx of DLD, hypothyroid, chronic back pain, hx cerebral aneurysm s/p coiling (2010) p/w upper back pain and SOB.  Pt states that for months she has been having SOB on exertion, she had a cardiac work up with no clear explantation for her dyspnea. She states it has progressively worsened acutely a few days ago.    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 650 milliGRAM(s) Oral every 6 hours  amLODIPine   Tablet 2.5 milliGRAM(s) Oral daily  atorvastatin 10 milliGRAM(s) Oral at bedtime  budesonide 160 MICROgram(s)/formoterol 4.5 MICROgram(s) Inhaler 2 Puff(s) Inhalation two times a day  enoxaparin Injectable 40 milliGRAM(s) SubCutaneous every 24 hours  gabapentin 300 milliGRAM(s) Oral three times a day  levothyroxine 25 MICROGram(s) Oral daily  lidocaine   4% Patch 1 Patch Transdermal daily  polyethylene glycol 3350 17 Gram(s) Oral daily  senna 2 Tablet(s) Oral at bedtime  tiotropium 2.5 MICROgram(s) Inhaler 2 Puff(s) Inhalation daily    MEDICATIONS  (PRN):  albuterol    90 MICROgram(s) HFA Inhaler 1 Puff(s) Inhalation every 6 hours PRN Shortness of Breath  albuterol/ipratropium for Nebulization 3 milliLiter(s) Nebulizer every 6 hours PRN Shortness of Breath  benzonatate 100 milliGRAM(s) Oral three times a day PRN Cough  guaifenesin/dextromethorphan Oral Liquid 10 milliLiter(s) Oral every 6 hours PRN Cough  ondansetron Injectable 4 milliGRAM(s) IV Push four times a day PRN Nausea and/or Vomiting      Allergies  Compazine (Unknown)      MEDICATIONS  (STANDING):  acetaminophen     Tablet .. 650 milliGRAM(s) Oral every 6 hours  amLODIPine   Tablet 2.5 milliGRAM(s) Oral daily  atorvastatin 10 milliGRAM(s) Oral at bedtime  budesonide 160 MICROgram(s)/formoterol 4.5 MICROgram(s) Inhaler 2 Puff(s) Inhalation two times a day  enoxaparin Injectable 40 milliGRAM(s) SubCutaneous every 24 hours  fluticasone propionate 50 MICROgram(s)/spray Nasal Spray 1 Spray(s) Both Nostrils two times a day  gabapentin 300 milliGRAM(s) Oral three times a day  levothyroxine 25 MICROGram(s) Oral daily  lidocaine   4% Patch 1 Patch Transdermal daily  polyethylene glycol 3350 17 Gram(s) Oral daily  senna 2 Tablet(s) Oral at bedtime  tiotropium 2.5 MICROgram(s) Inhaler 2 Puff(s) Inhalation daily    MEDICATIONS  (PRN):  albuterol    90 MICROgram(s) HFA Inhaler 1 Puff(s) Inhalation every 6 hours PRN Shortness of Breath  albuterol/ipratropium for Nebulization 3 milliLiter(s) Nebulizer every 6 hours PRN Shortness of Breath  benzonatate 100 milliGRAM(s) Oral three times a day PRN Cough  guaifenesin/dextromethorphan Oral Liquid 10 milliLiter(s) Oral every 6 hours PRN Cough  metoclopramide 10 milliGRAM(s) Oral every 6 hours PRN nausea /vomiting  oxyCODONE    IR 20 milliGRAM(s) Oral every 4 hours PRN Severe Pain (7 - 10)  temazepam 15 milliGRAM(s) Oral at bedtime PRN Insomnia    Vital Signs Last 24 Hrs  T(C): 35.9 (14 Jan 2023 04:47), Max: 37 (13 Jan 2023 13:55)  T(F): 96.6 (14 Jan 2023 04:47), Max: 98.6 (13 Jan 2023 13:55)  HR: 99 (14 Jan 2023 04:47) (80 - 105)  BP: 139/72 (14 Jan 2023 04:47) (120/71 - 139/72)  BP(mean): --  RR: 18 (14 Jan 2023 04:47) (18 - 20)  SpO2: 96% (14 Jan 2023 04:47) (96% - 97%)    Parameters below as of 14 Jan 2023 04:47  Patient On (Oxygen Delivery Method): nasal cannula        PHYSICAL EXAM:    GENERAL : AAOX3, NAD, obesity   HEENT: PERRLA, EOMI  NECK: no JVD, no thyromegaly   CVS: S1 S2 no murmur no rubs no gallop  RESP:  breath sounds and wheeze, no rhonchi no rales  ABD: Soft, NT, ND, tympanic, no rebound or guarding   : No Barillas, No CVA tenderness   EXT; no pedal edema, no cyanosis  MSK: No ML spinal tenderness, normal ROM   NEURO: AAOX3, no new focal deficits      LABS:                          12.2   12.49 )-----------( 226      ( 08 Jan 2023 12:16 )             39.3                           14.1   14.84 )-----------( 276      ( 03 Jan 2023 09:42 )             44.7     01-08    133<L>  |  95<L>  |  14  ----------------------------<  118<H>  4.4   |  25  |  0.9    Ca    9.2      08 Jan 2023 12:16    TPro  6.3  /  Alb  4.0  /  TBili  0.5  /  DBili  x   /  AST  27  /  ALT  18  /  AlkPhos  136<H>  01-08      01-03    138  |  99  |  19  ----------------------------<  154<H>  4.8   |  27  |  0.8    Ca    9.4      03 Jan 2023 09:42    TPro  6.7  /  Alb  4.1  /  TBili  0.6  /  DBili  x   /  AST  15  /  ALT  20  /  AlkPhos  96  01-03        
  WILFREDO CHO  82y, Female  Allergy: Compazine (Unknown)      LAST 24-Hr EVENTS:  complains of increased cough with clear sputum  VITALS:  T(F): 97.2 (01-01-23 @ 05:53), Max: 97.2 (01-01-23 @ 05:53)  HR: 88 (01-01-23 @ 05:53)  BP: 166/77 (01-01-23 @ 05:53) (148/62 - 166/77)  RR: 16 (01-01-23 @ 05:53)  SpO2: --    PHYSICAL EXAM:    GENERAL: NAD, well-developed  HEAD:  Atraumatic, Normocephalic  NECK: Supple, No JVD  CHEST/LUNG: Scattered rhonchi bilaterally; Occasional wheeze  HEART: Regular rate and rhythm; No murmurs, rubs, or gallops  ABDOMEN: Soft, Nontender, Nondistended; Bowel sounds present  EXTREMITIES:  2+ Peripheral Pulses, No clubbing, cyanosis, or edema        TESTS & MEASUREMENTS:    IN: 0 mL / OUT: 300 mL / NET: -300 mL    IN: 0 mL / OUT: 500 mL / NET: -500 mL                            10.6   10.32 )-----------( 213      ( 31 Dec 2022 08:23 )             33.9       12-31    140  |  104  |  15  ----------------------------<  95  3.6   |  28  |  0.6<L>    Ca    8.5      31 Dec 2022 08:23    TPro  5.2<L>  /  Alb  3.7  /  TBili  0.4  /  DBili  x   /  AST  15  /  ALT  18  /  AlkPhos  78  12-31    LIVER FUNCTIONS - ( 31 Dec 2022 08:23 )  Alb: 3.7 g/dL / Pro: 5.2 g/dL / ALK PHOS: 78 U/L / ALT: 18 U/L / AST: 15 U/L / GGT: x                 Culture - Urine (collected 12-28-22 @ 11:03)  Source: Clean Catch Clean Catch (Midstream)  Final Report (12-29-22 @ 22:24):    No growth    Culture - Blood (collected 12-26-22 @ 16:00)  Source: .Blood Blood  Final Report (12-31-22 @ 22:00):    No Growth Final    Culture - Blood (collected 12-26-22 @ 16:00)  Source: .Blood Blood  Final Report (12-31-22 @ 22:00):    No Growth Final          ABG & VENT SETTINGS: (when applicable)    n/a    RADIOLOGY & ADDITIONAL TESTS:    MEDICATIONS:  MEDICATIONS  (STANDING):  amLODIPine   Tablet 2.5 milliGRAM(s) Oral daily  atorvastatin 10 milliGRAM(s) Oral at bedtime  enoxaparin Injectable 40 milliGRAM(s) SubCutaneous every 24 hours  levothyroxine 25 MICROGram(s) Oral daily  polyethylene glycol 3350 17 Gram(s) Oral daily  predniSONE   Tablet 60 milliGRAM(s) Oral daily  senna 2 Tablet(s) Oral at bedtime    MEDICATIONS  (PRN):  acetaminophen     Tablet .. 650 milliGRAM(s) Oral every 6 hours PRN Temp greater or equal to 38C (100.4F), Mild Pain (1 - 3)  albuterol    90 MICROgram(s) HFA Inhaler 1 Puff(s) Inhalation every 6 hours PRN Shortness of Breath  albuterol/ipratropium for Nebulization 3 milliLiter(s) Nebulizer every 6 hours PRN Shortness of Breath  benzonatate 100 milliGRAM(s) Oral three times a day PRN Cough  guaifenesin/dextromethorphan Oral Liquid 10 milliLiter(s) Oral every 6 hours PRN Cough  LORazepam   Injectable 2 milliGRAM(s) IV Push once PRN Anxiety  morphine  - Injectable 4 milliGRAM(s) IV Push every 4 hours PRN Severe Pain (7 - 10)  traMADol 50 milliGRAM(s) Oral every 4 hours PRN Moderate Pain (4 - 6)  zolpidem 5 milliGRAM(s) Oral at bedtime PRN Insomnia  zolpidem 5 milliGRAM(s) Oral at bedtime PRN Insomnia        
82y F pmhx of DLD, hypothyroid, chronic back pain, hx cerebral aneurysm s/p coiling (2010) p/w upper back pain and SOB.  Pt states that for months she has been having SOB on exertion, she had a cardiac work up with no clear explantation for her dyspnea. She states it has progressively worsened acutely a few days ago.    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 650 milliGRAM(s) Oral every 6 hours  amLODIPine   Tablet 2.5 milliGRAM(s) Oral daily  atorvastatin 10 milliGRAM(s) Oral at bedtime  enoxaparin Injectable 40 milliGRAM(s) SubCutaneous every 24 hours  gabapentin 300 milliGRAM(s) Oral three times a day  levothyroxine 25 MICROGram(s) Oral daily  lidocaine   4% Patch 1 Patch Transdermal daily  polyethylene glycol 3350 17 Gram(s) Oral daily  senna 2 Tablet(s) Oral at bedtime    MEDICATIONS  (PRN):  albuterol    90 MICROgram(s) HFA Inhaler 1 Puff(s) Inhalation every 6 hours PRN Shortness of Breath  albuterol/ipratropium for Nebulization 3 milliLiter(s) Nebulizer every 6 hours PRN Shortness of Breath  benzonatate 100 milliGRAM(s) Oral three times a day PRN Cough  guaifenesin/dextromethorphan Oral Liquid 10 milliLiter(s) Oral every 6 hours PRN Cough  ondansetron Injectable 4 milliGRAM(s) IV Push four times a day PRN Nausea and/or Vomiting  oxyCODONE    IR 10 milliGRAM(s) Oral every 4 hours PRN Moderate Pain (4 - 6)  oxyCODONE    IR 20 milliGRAM(s) Oral every 4 hours PRN Severe Pain (7 - 10)      Allergies  Compazine (Unknown)    MEDICATIONS  (STANDING):  acetaminophen     Tablet .. 650 milliGRAM(s) Oral every 6 hours  amLODIPine   Tablet 2.5 milliGRAM(s) Oral daily  atorvastatin 10 milliGRAM(s) Oral at bedtime  enoxaparin Injectable 40 milliGRAM(s) SubCutaneous every 24 hours  gabapentin 300 milliGRAM(s) Oral three times a day  levothyroxine 25 MICROGram(s) Oral daily  lidocaine   4% Patch 1 Patch Transdermal daily  polyethylene glycol 3350 17 Gram(s) Oral daily  senna 2 Tablet(s) Oral at bedtime    MEDICATIONS  (PRN):  albuterol    90 MICROgram(s) HFA Inhaler 1 Puff(s) Inhalation every 6 hours PRN Shortness of Breath  albuterol/ipratropium for Nebulization 3 milliLiter(s) Nebulizer every 6 hours PRN Shortness of Breath  benzonatate 100 milliGRAM(s) Oral three times a day PRN Cough  guaifenesin/dextromethorphan Oral Liquid 10 milliLiter(s) Oral every 6 hours PRN Cough  ondansetron Injectable 4 milliGRAM(s) IV Push four times a day PRN Nausea and/or Vomiting  oxyCODONE    IR 10 milliGRAM(s) Oral every 4 hours PRN Moderate Pain (4 - 6)  oxyCODONE    IR 20 milliGRAM(s) Oral every 4 hours PRN Severe Pain (7 - 10)        PHYSICAL EXAM:    GENERAL : AAOX3, NAD, obesity   HEENT: PERRLA, EOMI  NECK: no JVD, no thyromegaly   CVS: S1 S2 no murmur no rubs no gallop  RESP:  diffuse decreased breath sounds and wheeze, no rhonchi no rales  ABD: Soft, NT, ND, tympanic, no rebound or guarding   : No Barillas, No CVA tenderness   EXT; no pedal edema, no cyanosis  MSK: No ML spinal tenderness, normal ROM   NEURO: AAOX3, no new focal deficits      LABS:                        14.1   14.84 )-----------( 276      ( 03 Jan 2023 09:42 )             44.7     01-03    138  |  99  |  19  ----------------------------<  154<H>  4.8   |  27  |  0.8    Ca    9.4      03 Jan 2023 09:42    TPro  6.7  /  Alb  4.1  /  TBili  0.6  /  DBili  x   /  AST  15  /  ALT  20  /  AlkPhos  96  01-03    
82y F pmhx of DLD, hypothyroid, chronic back pain, hx cerebral aneurysm s/p coiling (2010) p/w upper back pain and SOB.  Pt states that for months she has been having SOB on exertion, she had a cardiac work up with no clear explantation for her dyspnea. She states it has progressively worsened acutely a few days ago.    Today:  Seen at bedside, no new complaints.          REVIEW OF SYSTEMS:  Upper Back pain, SOB    MEDICATIONS  (STANDING):  amLODIPine   Tablet 2.5 milliGRAM(s) Oral daily  atorvastatin 10 milliGRAM(s) Oral at bedtime  enoxaparin Injectable 40 milliGRAM(s) SubCutaneous every 24 hours  levothyroxine 25 MICROGram(s) Oral daily  polyethylene glycol 3350 17 Gram(s) Oral daily  predniSONE   Tablet 60 milliGRAM(s) Oral daily  senna 2 Tablet(s) Oral at bedtime    MEDICATIONS  (PRN):  acetaminophen     Tablet .. 650 milliGRAM(s) Oral every 6 hours PRN Temp greater or equal to 38C (100.4F), Mild Pain (1 - 3)  albuterol    90 MICROgram(s) HFA Inhaler 1 Puff(s) Inhalation every 6 hours PRN Shortness of Breath  albuterol/ipratropium for Nebulization 3 milliLiter(s) Nebulizer every 6 hours PRN Shortness of Breath  benzonatate 100 milliGRAM(s) Oral three times a day PRN Cough  guaifenesin/dextromethorphan Oral Liquid 10 milliLiter(s) Oral every 6 hours PRN Cough  LORazepam   Injectable 2 milliGRAM(s) IV Push once PRN Anxiety  morphine  - Injectable 4 milliGRAM(s) IV Push every 4 hours PRN Severe Pain (7 - 10)  traMADol 50 milliGRAM(s) Oral every 4 hours PRN Moderate Pain (4 - 6)  zolpidem 5 milliGRAM(s) Oral at bedtime PRN Insomnia  zolpidem 5 milliGRAM(s) Oral at bedtime PRN Insomnia      Allergies  Compazine (Unknown)          Vital Signs Last 24 Hrs  T(C): 36.2 (01 Jan 2023 05:53), Max: 36.2 (01 Jan 2023 05:53)  T(F): 97.2 (01 Jan 2023 05:53), Max: 97.2 (01 Jan 2023 05:53)  HR: 88 (01 Jan 2023 05:53) (88 - 104)  BP: 166/77 (01 Jan 2023 05:53) (148/62 - 166/77)  RR: 16 (01 Jan 2023 05:53) (16 - 16)  SpO2: 95% (01 Jan 2023 09:08) (95% - 95%)    Parameters below as of 01 Jan 2023 09:08  Patient On (Oxygen Delivery Method): nasal cannula  O2 Flow (L/min): 3      PHYSICAL EXAM:  GA : AAOX3, NAD, obesity   HEENT: PERRLA, EOMI  NECK: no JVD, no thyromegaly   CVS: S1 S2 no murmur no rubs no gallop  RESP:  diffuse decreased breath sounds and wheeze, no rhonchi no rales  ABD: Soft, NT, ND, tympanic, no rebound or guarding   : No Barillas, No CVA tenderness   EXT; no pedal edema, no cyanosis  MSK: No ML spinal tenderness, normal ROM   NEURO: AAOX3, no new focal deficits      LABS:                        12.5   14.48 )-----------( 242      ( 01 Jan 2023 09:28 )             40.0     01-01    138  |  100  |  17  ----------------------------<  167<H>  4.5   |  27  |  0.8    Ca    9.4      01 Jan 2023 09:28    TPro  6.0  /  Alb  4.3  /  TBili  0.6  /  DBili  x   /  AST  16  /  ALT  21  /  AlkPhos  91  01-01      
82y F pmhx of DLD, hypothyroid, chronic back pain, hx cerebral aneurysm s/p coiling (2010) p/w upper back pain and SOB.  Pt states that for months she has been having SOB on exertion, she had a cardiac work up with no clear explantation for her dyspnea. She states it has progressively worsened acutely a few days ago.    Today:  Seen at bedside, still complains of SOB and upper Back pain.          REVIEW OF SYSTEMS:  Back pain  SOB      MEDICATIONS  (STANDING):  amLODIPine   Tablet 2.5 milliGRAM(s) Oral daily  atorvastatin 10 milliGRAM(s) Oral at bedtime  enoxaparin Injectable 40 milliGRAM(s) SubCutaneous every 24 hours  levothyroxine 25 MICROGram(s) Oral daily  polyethylene glycol 3350 17 Gram(s) Oral daily  predniSONE   Tablet 60 milliGRAM(s) Oral daily  senna 2 Tablet(s) Oral at bedtime    MEDICATIONS  (PRN):  acetaminophen     Tablet .. 650 milliGRAM(s) Oral every 6 hours PRN Temp greater or equal to 38C (100.4F), Mild Pain (1 - 3)  albuterol    90 MICROgram(s) HFA Inhaler 1 Puff(s) Inhalation every 6 hours PRN Shortness of Breath  albuterol/ipratropium for Nebulization 3 milliLiter(s) Nebulizer every 6 hours PRN Shortness of Breath  benzonatate 100 milliGRAM(s) Oral three times a day PRN Cough  guaifenesin/dextromethorphan Oral Liquid 10 milliLiter(s) Oral every 6 hours PRN Cough  LORazepam   Injectable 2 milliGRAM(s) IV Push once PRN Anxiety  morphine  - Injectable 4 milliGRAM(s) IV Push every 4 hours PRN Severe Pain (7 - 10)  traMADol 50 milliGRAM(s) Oral every 4 hours PRN Moderate Pain (4 - 6)  zolpidem 5 milliGRAM(s) Oral at bedtime PRN Insomnia  zolpidem 5 milliGRAM(s) Oral at bedtime PRN Insomnia      Allergies  Compazine (Unknown)          Vital Signs Last 24 Hrs  T(C): 35.8 (02 Jan 2023 14:03), Max: 36.4 (02 Jan 2023 05:00)  T(F): 96.4 (02 Jan 2023 14:03), Max: 97.6 (02 Jan 2023 05:00)  HR: 95 (02 Jan 2023 14:03) (80 - 95)  BP: 123/62 (02 Jan 2023 14:03) (123/62 - 162/80)  RR: 16 (02 Jan 2023 14:03) (16 - 16)  SpO2: 95% (02 Jan 2023 12:24) (95% - 95%)    Parameters below as of 02 Jan 2023 12:24  Patient On (Oxygen Delivery Method): room air        PHYSICAL EXAM:  GA : AAOX3, NAD, obesity   HEENT: PERRLA, EOMI  NECK: no JVD, no thyromegaly   CVS: S1 S2 no murmur no rubs no gallop  RESP:  diffuse decreased breath sounds and wheeze, no rhonchi no rales  ABD: Soft, NT, ND, tympanic, no rebound or guarding   : No Barillas, No CVA tenderness   EXT; no pedal edema, no cyanosis  MSK: No ML spinal tenderness, normal ROM   NEURO: AAOX3, no new focal deficits      LABS:                        12.9   12.80 )-----------( 273      ( 02 Jan 2023 09:33 )             41.4     01-02    140  |  103  |  18  ----------------------------<  120<H>  4.8   |  26  |  0.8    Ca    9.2      02 Jan 2023 09:33    TPro  6.6  /  Alb  4.1  /  TBili  0.6  /  DBili  x   /  AST  17  /  ALT  21  /  AlkPhos  90  01-02        
82y F pmhx of DLD, hypothyroid, chronic back pain, hx cerebral aneurysm s/p coiling (2010) p/w upper back pain and SOB.  Pt states that for months she has been having SOB on exertion, she had a cardiac work up with no clear explantation for her dyspnea. She states it has progressively worsened acutely a few days ago.    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 650 milliGRAM(s) Oral every 6 hours  amLODIPine   Tablet 2.5 milliGRAM(s) Oral daily  atorvastatin 10 milliGRAM(s) Oral at bedtime  budesonide 160 MICROgram(s)/formoterol 4.5 MICROgram(s) Inhaler 2 Puff(s) Inhalation two times a day  enoxaparin Injectable 40 milliGRAM(s) SubCutaneous every 24 hours  gabapentin 300 milliGRAM(s) Oral three times a day  levothyroxine 25 MICROGram(s) Oral daily  lidocaine   4% Patch 1 Patch Transdermal daily  polyethylene glycol 3350 17 Gram(s) Oral daily  senna 2 Tablet(s) Oral at bedtime  tiotropium 2.5 MICROgram(s) Inhaler 2 Puff(s) Inhalation daily    MEDICATIONS  (PRN):  albuterol    90 MICROgram(s) HFA Inhaler 1 Puff(s) Inhalation every 6 hours PRN Shortness of Breath  albuterol/ipratropium for Nebulization 3 milliLiter(s) Nebulizer every 6 hours PRN Shortness of Breath  benzonatate 100 milliGRAM(s) Oral three times a day PRN Cough  guaifenesin/dextromethorphan Oral Liquid 10 milliLiter(s) Oral every 6 hours PRN Cough  ondansetron Injectable 4 milliGRAM(s) IV Push four times a day PRN Nausea and/or Vomiting  oxyCODONE    IR 10 milliGRAM(s) Oral every 4 hours PRN Moderate Pain (4 - 6)  oxyCODONE    IR 20 milliGRAM(s) Oral every 4 hours PRN Severe Pain (7 - 10)        Allergies  Compazine (Unknown)    Vital Signs Last 24 Hrs  T(C): 35.9 (09 Jan 2023 05:00), Max: 36.1 (08 Jan 2023 14:21)  T(F): 96.7 (09 Jan 2023 05:00), Max: 96.9 (08 Jan 2023 14:21)  HR: 84 (09 Jan 2023 05:00) (84 - 104)  BP: 114/74 (09 Jan 2023 05:00) (114/74 - 133/62)  BP(mean): --  RR: 18 (09 Jan 2023 05:00) (18 - 18)  SpO2: 94% (08 Jan 2023 12:42) (94% - 96%)    Parameters below as of 08 Jan 2023 12:42  Patient On (Oxygen Delivery Method): nasal cannula  O2 Flow (L/min): 2      PHYSICAL EXAM:    GENERAL : AAOX3, NAD, obesity   HEENT: PERRLA, EOMI  NECK: no JVD, no thyromegaly   CVS: S1 S2 no murmur no rubs no gallop  RESP:  breath sounds and wheeze, no rhonchi no rales  ABD: Soft, NT, ND, tympanic, no rebound or guarding   : No Barillas, No CVA tenderness   EXT; no pedal edema, no cyanosis  MSK: No ML spinal tenderness, normal ROM   NEURO: AAOX3, no new focal deficits      LABS:                          12.2   12.49 )-----------( 226      ( 08 Jan 2023 12:16 )             39.3                           14.1   14.84 )-----------( 276      ( 03 Jan 2023 09:42 )             44.7     01-08    133<L>  |  95<L>  |  14  ----------------------------<  118<H>  4.4   |  25  |  0.9    Ca    9.2      08 Jan 2023 12:16    TPro  6.3  /  Alb  4.0  /  TBili  0.5  /  DBili  x   /  AST  27  /  ALT  18  /  AlkPhos  136<H>  01-08 01-03    138  |  99  |  19  ----------------------------<  154<H>  4.8   |  27  |  0.8    Ca    9.4      03 Jan 2023 09:42    TPro  6.7  /  Alb  4.1  /  TBili  0.6  /  DBili  x   /  AST  15  /  ALT  20  /  AlkPhos  96  01-03      
  Interval history and ROS:    Events noted  Confused but non-focal  No respiratory distress noted  C/o pain  Anxious over medications being administered  Evaluated by medical staff and planned for [facility]    MEDICATIONS:  acetaminophen     Tablet .. 650 milliGRAM(s) Oral every 6 hours PRN  albuterol    90 MICROgram(s) HFA Inhaler 1 Puff(s) Inhalation every 6 hours PRN  albuterol/ipratropium for Nebulization 3 milliLiter(s) Nebulizer every 6 hours PRN  amLODIPine   Tablet 2.5 milliGRAM(s) Oral daily  atorvastatin 10 milliGRAM(s) Oral at bedtime  benzonatate 100 milliGRAM(s) Oral three times a day PRN  enoxaparin Injectable 40 milliGRAM(s) SubCutaneous every 24 hours  guaifenesin/dextromethorphan Oral Liquid 10 milliLiter(s) Oral every 6 hours PRN  levothyroxine 25 MICROGram(s) Oral daily  polyethylene glycol 3350 17 Gram(s) Oral daily  predniSONE   Tablet 60 milliGRAM(s) Oral daily  senna 2 Tablet(s) Oral at bedtime  zolpidem 5 milliGRAM(s) Oral at bedtime PRN  zolpidem 5 milliGRAM(s) Oral at bedtime PRN      VITAL SIGNS:  Vital Signs Last 24 Hrs  T(C): 36.8 (03 Jan 2023 05:00), Max: 36.8 (03 Jan 2023 05:00)  T(F): 98.2 (03 Jan 2023 05:00), Max: 98.2 (03 Jan 2023 05:00)  HR: 83 (03 Jan 2023 05:00) (83 - 95)  BP: 162/69 (03 Jan 2023 05:00) (123/62 - 162/69)  RR: 18 (03 Jan 2023 05:00) (16 - 18)  SpO2: 97% (03 Jan 2023 01:06) (93% - 97%)    Parameters below as of 03 Jan 2023 01:06  Patient On (Oxygen Delivery Method): nasal cannula  O2 Flow (L/min): 2      PHYSICAL EXAM:  Awake and alert NAD, obese  Neck supple, no LN  S1 and S2 no murmur  Lungs are clear without wheeze, rhonchi or rales  Abdomen is soft and non tender  There is no edema  Neurologically non focal      LABS:                        12.9   12.80 )-----------( 273      ( 02 Jan 2023 09:33 )             41.4     01-02    140  |  103  |  18  ----------------------------<  120<H>  4.8   |  26  |  0.8    Ca    9.2      02 Jan 2023 09:33    TPro  6.6  /  Alb  4.1  /  TBili  0.6  /  DBili  x   /  AST  17  /  ALT  21  /  AlkPhos  90  01-02    LIVER FUNCTIONS - ( 02 Jan 2023 09:33 )  Alb: 4.1 g/dL / Pro: 6.6 g/dL / ALK PHOS: 90 U/L / ALT: 21 U/L / AST: 17 U/L / GGT: x               RADIOLOGY & ADDITIONAL TESTS:   < from: Xray Chest 1 View- PORTABLE-Urgent (Xray Chest 1 View- PORTABLE-Urgent .) (01.01.23 @ 15:26) >  Low lung volume. Left perihilar linear opacity                
82y F pmhx of DLD, hypothyroid, chronic back pain, hx cerebral aneurysm s/p coiling (2010) p/w upper back pain and SOB.  Pt states that for months she has been having SOB on exertion, she had a cardiac work up with no clear explantation for her dyspnea. She states it has progressively worsened acutely a few days ago.    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 650 milliGRAM(s) Oral every 6 hours  amLODIPine   Tablet 2.5 milliGRAM(s) Oral daily  atorvastatin 10 milliGRAM(s) Oral at bedtime  enoxaparin Injectable 40 milliGRAM(s) SubCutaneous every 24 hours  gabapentin 300 milliGRAM(s) Oral three times a day  levothyroxine 25 MICROGram(s) Oral daily  lidocaine   4% Patch 1 Patch Transdermal daily  polyethylene glycol 3350 17 Gram(s) Oral daily  senna 2 Tablet(s) Oral at bedtime    MEDICATIONS  (PRN):  albuterol    90 MICROgram(s) HFA Inhaler 1 Puff(s) Inhalation every 6 hours PRN Shortness of Breath  albuterol/ipratropium for Nebulization 3 milliLiter(s) Nebulizer every 6 hours PRN Shortness of Breath  benzonatate 100 milliGRAM(s) Oral three times a day PRN Cough  guaifenesin/dextromethorphan Oral Liquid 10 milliLiter(s) Oral every 6 hours PRN Cough  ondansetron Injectable 4 milliGRAM(s) IV Push four times a day PRN Nausea and/or Vomiting  oxyCODONE    IR 10 milliGRAM(s) Oral every 4 hours PRN Moderate Pain (4 - 6)  oxyCODONE    IR 20 milliGRAM(s) Oral every 4 hours PRN Severe Pain (7 - 10)      Allergies  Compazine (Unknown)    Vital Signs Last 24 Hrs  T(C): 36.2 (08 Jan 2023 04:50), Max: 36.7 (07 Jan 2023 13:45)  T(F): 97.1 (08 Jan 2023 04:50), Max: 98 (07 Jan 2023 13:45)  HR: 100 (08 Jan 2023 04:50) (94 - 100)  BP: 165/91 (08 Jan 2023 04:50) (139/67 - 165/91)  BP(mean): --  RR: 18 (08 Jan 2023 04:50) (18 - 18)  SpO2: 91% (07 Jan 2023 15:37) (91% - 91%)    Parameters below as of 07 Jan 2023 15:37  Patient On (Oxygen Delivery Method): nasal cannula  O2 Flow (L/min): 2      PHYSICAL EXAM:    GENERAL : AAOX3, NAD, obesity   HEENT: PERRLA, EOMI  NECK: no JVD, no thyromegaly   CVS: S1 S2 no murmur no rubs no gallop  RESP:  breath sounds and wheeze, no rhonchi no rales  ABD: Soft, NT, ND, tympanic, no rebound or guarding   : No Barillas, No CVA tenderness   EXT; no pedal edema, no cyanosis  MSK: No ML spinal tenderness, normal ROM   NEURO: AAOX3, no new focal deficits      LABS:                        14.1   14.84 )-----------( 276      ( 03 Jan 2023 09:42 )             44.7     01-03    138  |  99  |  19  ----------------------------<  154<H>  4.8   |  27  |  0.8    Ca    9.4      03 Jan 2023 09:42    TPro  6.7  /  Alb  4.1  /  TBili  0.6  /  DBili  x   /  AST  15  /  ALT  20  /  AlkPhos  96  01-03    
82y F pmhx of DLD, hypothyroid, chronic back pain, hx cerebral aneurysm s/p coiling (2010) p/w upper back pain and SOB.  Pt states that for months she has been having SOB on exertion, she had a cardiac work up with no clear explantation for her dyspnea. She states it has progressively worsened acutely a few days ago.    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 650 milliGRAM(s) Oral every 6 hours  amLODIPine   Tablet 2.5 milliGRAM(s) Oral daily  atorvastatin 10 milliGRAM(s) Oral at bedtime  enoxaparin Injectable 40 milliGRAM(s) SubCutaneous every 24 hours  gabapentin 300 milliGRAM(s) Oral three times a day  levothyroxine 25 MICROGram(s) Oral daily  lidocaine   4% Patch 1 Patch Transdermal daily  polyethylene glycol 3350 17 Gram(s) Oral daily  senna 2 Tablet(s) Oral at bedtime    MEDICATIONS  (PRN):  albuterol    90 MICROgram(s) HFA Inhaler 1 Puff(s) Inhalation every 6 hours PRN Shortness of Breath  albuterol/ipratropium for Nebulization 3 milliLiter(s) Nebulizer every 6 hours PRN Shortness of Breath  benzonatate 100 milliGRAM(s) Oral three times a day PRN Cough  guaifenesin/dextromethorphan Oral Liquid 10 milliLiter(s) Oral every 6 hours PRN Cough  ondansetron Injectable 4 milliGRAM(s) IV Push four times a day PRN Nausea and/or Vomiting  oxyCODONE    IR 10 milliGRAM(s) Oral every 4 hours PRN Moderate Pain (4 - 6)  oxyCODONE    IR 20 milliGRAM(s) Oral every 4 hours PRN Severe Pain (7 - 10)      Allergies  Compazine (Unknown)    Vital Signs Last 24 Hrs  T(C): 36.5 (07 Jan 2023 05:19), Max: 37.6 (06 Jan 2023 14:31)  T(F): 97.7 (07 Jan 2023 05:19), Max: 99.6 (06 Jan 2023 14:31)  HR: 103 (07 Jan 2023 05:19) (92 - 121)  BP: 121/59 (07 Jan 2023 05:19) (121/59 - 129/57)  BP(mean): --  RR: 18 (06 Jan 2023 21:11) (18 - 18)  SpO2: 94% (07 Jan 2023 07:41) (94% - 94%)    Parameters below as of 07 Jan 2023 07:41  Patient On (Oxygen Delivery Method): room air        PHYSICAL EXAM:    GENERAL : AAOX3, NAD, obesity   HEENT: PERRLA, EOMI  NECK: no JVD, no thyromegaly   CVS: S1 S2 no murmur no rubs no gallop  RESP:  diffuse decreased breath sounds and wheeze, no rhonchi no rales  ABD: Soft, NT, ND, tympanic, no rebound or guarding   : No Barillas, No CVA tenderness   EXT; no pedal edema, no cyanosis  MSK: No ML spinal tenderness, normal ROM   NEURO: AAOX3, no new focal deficits      LABS:                        14.1   14.84 )-----------( 276      ( 03 Jan 2023 09:42 )             44.7     01-03    138  |  99  |  19  ----------------------------<  154<H>  4.8   |  27  |  0.8    Ca    9.4      03 Jan 2023 09:42    TPro  6.7  /  Alb  4.1  /  TBili  0.6  /  DBili  x   /  AST  15  /  ALT  20  /  AlkPhos  96  01-03    
82y F pmhx of DLD, hypothyroid, chronic back pain, hx cerebral aneurysm s/p coiling (2010) p/w upper back pain and SOB.  Pt states that for months she has been having SOB on exertion, she had a cardiac work up with no clear explantation for her dyspnea. She states it has progressively worsened acutely a few days ago.    Today:  Seen at bedside, AAO x 3.  Episode of confusion last night likely delirium due to combination hospitalization/opiates.  CT head last night unremarkable.        REVIEW OF SYSTEMS:  Upper Back pain      MEDICATIONS  (STANDING):  acetaminophen     Tablet .. 650 milliGRAM(s) Oral every 6 hours  amLODIPine   Tablet 2.5 milliGRAM(s) Oral daily  atorvastatin 10 milliGRAM(s) Oral at bedtime  enoxaparin Injectable 40 milliGRAM(s) SubCutaneous every 24 hours  gabapentin 300 milliGRAM(s) Oral three times a day  levothyroxine 25 MICROGram(s) Oral daily  polyethylene glycol 3350 17 Gram(s) Oral daily  senna 2 Tablet(s) Oral at bedtime    MEDICATIONS  (PRN):  albuterol    90 MICROgram(s) HFA Inhaler 1 Puff(s) Inhalation every 6 hours PRN Shortness of Breath  albuterol/ipratropium for Nebulization 3 milliLiter(s) Nebulizer every 6 hours PRN Shortness of Breath  benzonatate 100 milliGRAM(s) Oral three times a day PRN Cough  guaifenesin/dextromethorphan Oral Liquid 10 milliLiter(s) Oral every 6 hours PRN Cough  oxyCODONE    IR 10 milliGRAM(s) Oral every 4 hours PRN Moderate Pain (4 - 6)  oxyCODONE    IR 20 milliGRAM(s) Oral every 4 hours PRN Severe Pain (7 - 10)  zolpidem 5 milliGRAM(s) Oral at bedtime PRN Insomnia  zolpidem 5 milliGRAM(s) Oral at bedtime PRN Insomnia      Allergies  Compazine (Unknown)          Vital Signs Last 24 Hrs  T(C): 36 (03 Jan 2023 13:15), Max: 36.8 (03 Jan 2023 05:00)  T(F): 96.8 (03 Jan 2023 13:15), Max: 98.2 (03 Jan 2023 05:00)  HR: 104 (03 Jan 2023 13:15) (83 - 104)  BP: 146/88 (03 Jan 2023 13:15) (146/88 - 162/69)  RR: 18 (03 Jan 2023 13:15) (18 - 18)  SpO2: 97% (03 Jan 2023 01:06) (93% - 97%)    Parameters below as of 03 Jan 2023 01:06  Patient On (Oxygen Delivery Method): nasal cannula  O2 Flow (L/min): 2      PHYSICAL EXAM:  GA : AAOX3, NAD, obesity   HEENT: PERRLA, EOMI  NECK: no JVD, no thyromegaly   CVS: S1 S2 no murmur no rubs no gallop  RESP:  diffuse decreased breath sounds and wheeze, no rhonchi no rales  ABD: Soft, NT, ND, tympanic, no rebound or guarding   : No Barillas, No CVA tenderness   EXT; no pedal edema, no cyanosis  MSK: No ML spinal tenderness, normal ROM   NEURO: AAOX3, no new focal deficits    LABS:                        14.1   14.84 )-----------( 276      ( 03 Jan 2023 09:42 )             44.7     01-03    138  |  99  |  19  ----------------------------<  154<H>  4.8   |  27  |  0.8    Ca    9.4      03 Jan 2023 09:42    TPro  6.7  /  Alb  4.1  /  TBili  0.6  /  DBili  x   /  AST  15  /  ALT  20  /  AlkPhos  96  01-03        
82y F pmhx of DLD, hypothyroid, chronic back pain, hx cerebral aneurysm s/p coiling (2010) p/w upper back pain and SOB.  Pt states that for months she has been having SOB on exertion, she had a cardiac work up with no clear explantation for her dyspnea. She states it has progressively worsened acutely a few days ago.    Today:  Seen at bedside, no new complaints.          REVIEW OF SYSTEMS:  Upper Back Pain  SOB      MEDICATIONS  (STANDING):  amLODIPine   Tablet 2.5 milliGRAM(s) Oral daily  atorvastatin 10 milliGRAM(s) Oral at bedtime  enoxaparin Injectable 40 milliGRAM(s) SubCutaneous every 24 hours  levothyroxine 25 MICROGram(s) Oral daily  polyethylene glycol 3350 17 Gram(s) Oral daily  predniSONE   Tablet 60 milliGRAM(s) Oral daily  senna 2 Tablet(s) Oral at bedtime    MEDICATIONS  (PRN):  acetaminophen     Tablet .. 650 milliGRAM(s) Oral every 6 hours PRN Temp greater or equal to 38C (100.4F), Mild Pain (1 - 3)  albuterol    90 MICROgram(s) HFA Inhaler 1 Puff(s) Inhalation every 6 hours PRN Shortness of Breath  albuterol/ipratropium for Nebulization 3 milliLiter(s) Nebulizer every 6 hours PRN Shortness of Breath  benzonatate 100 milliGRAM(s) Oral three times a day PRN Cough  guaifenesin/dextromethorphan Oral Liquid 10 milliLiter(s) Oral every 6 hours PRN Cough  LORazepam   Injectable 2 milliGRAM(s) IV Push once PRN Anxiety  morphine  - Injectable 4 milliGRAM(s) IV Push every 4 hours PRN Severe Pain (7 - 10)  traMADol 50 milliGRAM(s) Oral every 4 hours PRN Moderate Pain (4 - 6)  zolpidem 5 milliGRAM(s) Oral at bedtime PRN Insomnia  zolpidem 5 milliGRAM(s) Oral at bedtime PRN Insomnia      Allergies  Compazine (Unknown)          Vital Signs Last 24 Hrs  T(C): 35.9 (31 Dec 2022 13:17), Max: 36.7 (30 Dec 2022 21:44)  T(F): 96.7 (31 Dec 2022 13:17), Max: 98.1 (30 Dec 2022 21:44)  HR: 104 (31 Dec 2022 13:17) (88 - 104)  BP: 148/62 (31 Dec 2022 13:17) (129/73 - 148/62)  RR: 16 (31 Dec 2022 13:17) (16 - 18)  SpO2: 95% (31 Dec 2022 06:15) (95% - 95%)    Parameters below as of 31 Dec 2022 06:15  Patient On (Oxygen Delivery Method): nasal cannula  O2 Flow (L/min): 3      PHYSICAL EXAM:  GA : AAOX3, NAD, obesity   HEENT: PERRLA, EOMI  NECK: no JVD, no thyromegaly   CVS: S1 S2 no murmur no rubs no gallop  RESP:  diffuse decreased breath sounds and wheeze, no rhonchi no rales  ABD: Soft, NT, ND, tympanic, no rebound or guarding   : No Barillas, No CVA tenderness   EXT; no pedal edema, no cyanosis  MSK: No ML spinal tenderness, normal ROM   NEURO: AAOX3, no new focal deficits        LABS:                        10.6   10.32 )-----------( 213      ( 31 Dec 2022 08:23 )             33.9     12-31    140  |  104  |  15  ----------------------------<  95  3.6   |  28  |  0.6<L>    Ca    8.5      31 Dec 2022 08:23    TPro  5.2<L>  /  Alb  3.7  /  TBili  0.4  /  DBili  x   /  AST  15  /  ALT  18  /  AlkPhos  78  12-31        
82y F pmhx of DLD, hypothyroid, chronic back pain, hx cerebral aneurysm s/p coiling (2010) p/w upper back pain and SOB.  Pt states that for months she has been having SOB on exertion, she had a cardiac work up with no clear explantation for her dyspnea. She states it has progressively worsened acutely a few days ago.    Today:  Seen at bedside, still complains of SOB and upper Back pain.          REVIEW OF SYSTEMS:  SOB, upper Back pain    MEDICATIONS  (STANDING):  amLODIPine   Tablet 2.5 milliGRAM(s) Oral daily  atorvastatin 10 milliGRAM(s) Oral at bedtime  enoxaparin Injectable 40 milliGRAM(s) SubCutaneous every 24 hours  levothyroxine 25 MICROGram(s) Oral daily  predniSONE   Tablet 60 milliGRAM(s) Oral daily    MEDICATIONS  (PRN):  acetaminophen     Tablet .. 650 milliGRAM(s) Oral every 6 hours PRN Temp greater or equal to 38C (100.4F), Mild Pain (1 - 3)  albuterol    90 MICROgram(s) HFA Inhaler 1 Puff(s) Inhalation every 6 hours PRN Shortness of Breath  albuterol/ipratropium for Nebulization 3 milliLiter(s) Nebulizer every 6 hours PRN Shortness of Breath  benzonatate 100 milliGRAM(s) Oral three times a day PRN Cough  LORazepam   Injectable 2 milliGRAM(s) IV Push once PRN Anxiety  morphine  - Injectable 4 milliGRAM(s) IV Push every 4 hours PRN Severe Pain (7 - 10)  traMADol 50 milliGRAM(s) Oral every 4 hours PRN Moderate Pain (4 - 6)  zolpidem 5 milliGRAM(s) Oral at bedtime PRN Insomnia  zolpidem 5 milliGRAM(s) Oral at bedtime PRN Insomnia      Allergies  Compazine (Unknown)          Vital Signs Last 24 Hrs  T(C): 35.9 (29 Dec 2022 21:09), Max: 35.9 (29 Dec 2022 21:09)  T(F): 96.6 (29 Dec 2022 21:09), Max: 96.6 (29 Dec 2022 21:09)  HR: 104 (30 Dec 2022 06:18) (104 - 118)  BP: 143/83 (30 Dec 2022 06:18) (143/83 - 143/94)  RR: 24 (30 Dec 2022 06:18) (18 - 24)  SpO2: 96% (30 Dec 2022 06:18) (94% - 96%)    Parameters below as of 30 Dec 2022 06:18  Patient On (Oxygen Delivery Method): nasal cannula  O2 Flow (L/min): 4      PHYSICAL EXAM:  GA : AAOX3, NAD, obesity   HEENT: PERRLA, EOMI  NECK: no JVD, no thyromegaly   CVS: S1 S2 no murmur no rubs no gallop  RESP:  diffuse decreased breath sounds and wheeze, no rhonchi no rales  ABD: Soft, NT, ND, tympanic, no rebound or guarding   : No Barillas, No CVA tenderness   EXT; no pedal edema, no cyanosis  MSK: No ML spinal tenderness, normal ROM   NEURO: AAOX3, no new focal deficits      LABS:                        12.2   13.85 )-----------( 216      ( 30 Dec 2022 05:47 )             38.2     12-30    139  |  101  |  16  ----------------------------<  76  4.1   |  22  |  0.8    Ca    9.2      30 Dec 2022 05:47    TPro  5.9<L>  /  Alb  4.0  /  TBili  0.7  /  DBili  x   /  AST  26  /  ALT  22  /  AlkPhos  85  12-30        
82y F pmhx of DLD, hypothyroid, chronic back pain, hx cerebral aneurysm s/p coiling (2010) p/w upper back pain and SOB.  Pt states that for months she has been having SOB on exertion, she had a cardiac work up with no clear explantation for her dyspnea. She states it has progressively worsened acutely a few days ago.    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 650 milliGRAM(s) Oral every 6 hours  amLODIPine   Tablet 2.5 milliGRAM(s) Oral daily  atorvastatin 10 milliGRAM(s) Oral at bedtime  budesonide 160 MICROgram(s)/formoterol 4.5 MICROgram(s) Inhaler 2 Puff(s) Inhalation two times a day  enoxaparin Injectable 40 milliGRAM(s) SubCutaneous every 24 hours  gabapentin 300 milliGRAM(s) Oral three times a day  levothyroxine 25 MICROGram(s) Oral daily  lidocaine   4% Patch 1 Patch Transdermal daily  polyethylene glycol 3350 17 Gram(s) Oral daily  senna 2 Tablet(s) Oral at bedtime  tiotropium 2.5 MICROgram(s) Inhaler 2 Puff(s) Inhalation daily    MEDICATIONS  (PRN):  albuterol    90 MICROgram(s) HFA Inhaler 1 Puff(s) Inhalation every 6 hours PRN Shortness of Breath  albuterol/ipratropium for Nebulization 3 milliLiter(s) Nebulizer every 6 hours PRN Shortness of Breath  benzonatate 100 milliGRAM(s) Oral three times a day PRN Cough  guaifenesin/dextromethorphan Oral Liquid 10 milliLiter(s) Oral every 6 hours PRN Cough  ondansetron Injectable 4 milliGRAM(s) IV Push four times a day PRN Nausea and/or Vomiting      Allergies  Compazine (Unknown)    Vital Signs Last 24 Hrs  T(C): 35.8 (11 Jan 2023 04:57), Max: 35.8 (10 Juanpablo 2023 12:59)  T(F): 96.5 (11 Jan 2023 04:57), Max: 96.5 (11 Jan 2023 04:57)  HR: 99 (10 Juanpablo 2023 22:08) (97 - 99)  BP: 130/57 (11 Jan 2023 04:57) (118/64 - 137/78)  BP(mean): --  RR: 18 (11 Jan 2023 04:57) (18 - 18)  SpO2: 92% (10 Juanpablo 2023 11:50) (88% - 92%)    Parameters below as of 10 Juanpablo 2023 11:50  Patient On (Oxygen Delivery Method): nasal cannula  O2 Flow (L/min): 3      PHYSICAL EXAM:    GENERAL : AAOX3, NAD, obesity   HEENT: PERRLA, EOMI  NECK: no JVD, no thyromegaly   CVS: S1 S2 no murmur no rubs no gallop  RESP:  breath sounds and wheeze, no rhonchi no rales  ABD: Soft, NT, ND, tympanic, no rebound or guarding   : No Barillas, No CVA tenderness   EXT; no pedal edema, no cyanosis  MSK: No ML spinal tenderness, normal ROM   NEURO: AAOX3, no new focal deficits      LABS:                          12.2   12.49 )-----------( 226      ( 08 Jan 2023 12:16 )             39.3                           14.1   14.84 )-----------( 276      ( 03 Jan 2023 09:42 )             44.7     01-08    133<L>  |  95<L>  |  14  ----------------------------<  118<H>  4.4   |  25  |  0.9    Ca    9.2      08 Jan 2023 12:16    TPro  6.3  /  Alb  4.0  /  TBili  0.5  /  DBili  x   /  AST  27  /  ALT  18  /  AlkPhos  136<H>  01-08 01-03    138  |  99  |  19  ----------------------------<  154<H>  4.8   |  27  |  0.8    Ca    9.4      03 Jan 2023 09:42    TPro  6.7  /  Alb  4.1  /  TBili  0.6  /  DBili  x   /  AST  15  /  ALT  20  /  AlkPhos  96  01-03      
82y F pmhx of DLD, hypothyroid, chronic back pain, hx cerebral aneurysm s/p coiling (2010) p/w upper back pain and SOB.  Pt states that for months she has been having SOB on exertion, she had a cardiac work up with no clear explantation for her dyspnea. She states it has progressively worsened acutely a few days ago.    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 650 milliGRAM(s) Oral every 6 hours  amLODIPine   Tablet 2.5 milliGRAM(s) Oral daily  atorvastatin 10 milliGRAM(s) Oral at bedtime  budesonide 160 MICROgram(s)/formoterol 4.5 MICROgram(s) Inhaler 2 Puff(s) Inhalation two times a day  enoxaparin Injectable 40 milliGRAM(s) SubCutaneous every 24 hours  gabapentin 300 milliGRAM(s) Oral three times a day  levothyroxine 25 MICROGram(s) Oral daily  lidocaine   4% Patch 1 Patch Transdermal daily  polyethylene glycol 3350 17 Gram(s) Oral daily  senna 2 Tablet(s) Oral at bedtime  tiotropium 2.5 MICROgram(s) Inhaler 2 Puff(s) Inhalation daily    MEDICATIONS  (PRN):  albuterol    90 MICROgram(s) HFA Inhaler 1 Puff(s) Inhalation every 6 hours PRN Shortness of Breath  albuterol/ipratropium for Nebulization 3 milliLiter(s) Nebulizer every 6 hours PRN Shortness of Breath  benzonatate 100 milliGRAM(s) Oral three times a day PRN Cough  guaifenesin/dextromethorphan Oral Liquid 10 milliLiter(s) Oral every 6 hours PRN Cough  ondansetron Injectable 4 milliGRAM(s) IV Push four times a day PRN Nausea and/or Vomiting  oxyCODONE    IR 20 milliGRAM(s) Oral every 4 hours PRN Severe Pain (7 - 10)    Vital Signs Last 24 Hrs  T(C): 37.3 (13 Jan 2023 05:00), Max: 37.3 (13 Jan 2023 05:00)  T(F): 99.1 (13 Jan 2023 05:00), Max: 99.1 (13 Jan 2023 05:00)  HR: 116 (13 Jan 2023 05:00) (99 - 116)  BP: 130/64 (13 Jan 2023 05:00) (113/56 - 130/64)  BP(mean): --  RR: 20 (13 Jan 2023 05:00) (18 - 20)  SpO2: 97% (12 Jan 2023 09:52) (97% - 97%)    Parameters below as of 12 Jan 2023 09:52  Patient On (Oxygen Delivery Method): nasal cannula  O2 Flow (L/min): 3      PHYSICAL EXAM:    GENERAL : AAOX3, NAD, obesity   HEENT: PERRLA, EOMI  NECK: no JVD, no thyromegaly   CVS: S1 S2 no murmur no rubs no gallop  RESP:  breath sounds and wheeze, no rhonchi no rales  ABD: Soft, NT, ND, tympanic, no rebound or guarding   : No Barillas, No CVA tenderness   EXT; no pedal edema, no cyanosis  MSK: No ML spinal tenderness, normal ROM   NEURO: AAOX3, no new focal deficits      LABS:                          12.2   12.49 )-----------( 226      ( 08 Jan 2023 12:16 )             39.3                           14.1   14.84 )-----------( 276      ( 03 Jan 2023 09:42 )             44.7     01-08    133<L>  |  95<L>  |  14  ----------------------------<  118<H>  4.4   |  25  |  0.9    Ca    9.2      08 Jan 2023 12:16    TPro  6.3  /  Alb  4.0  /  TBili  0.5  /  DBili  x   /  AST  27  /  ALT  18  /  AlkPhos  136<H>  01-08      01-03    138  |  99  |  19  ----------------------------<  154<H>  4.8   |  27  |  0.8    Ca    9.4      03 Jan 2023 09:42    TPro  6.7  /  Alb  4.1  /  TBili  0.6  /  DBili  x   /  AST  15  /  ALT  20  /  AlkPhos  96  01-03        
WILFREDO [id number]    Subjective/Interval History   - SOB x 6 months, over the last 6 days worse SOB, with dry cough and pleuritic chest pain.   - 5 day wheezing     ROS  - no abdominal pain .   - no dysuria.     PHYSICAL EXAM  Vital Signs Last 24 Hrs  T(C): 36.6 (27 Dec 2022 14:08), Max: 36.6 (27 Dec 2022 14:08)  T(F): 97.8 (27 Dec 2022 14:08), Max: 97.8 (27 Dec 2022 14:08)  HR: 99 (27 Dec 2022 14:08) (99 - 109)  BP: 129/74 (27 Dec 2022 14:08) (129/74 - 177/93)  BP(mean): --  RR: 28 (26 Dec 2022 18:58) (28 - 28)  SpO2: 100% (26 Dec 2022 18:58) (100% - 100%)    Parameters below as of 26 Dec 2022 18:58  Patient On (Oxygen Delivery Method): nasal cannula  O2 Flow (L/min): 4    GA : AAOX3, NAD, obesity   HEENT: PERRLA, EOMI  NECK: no JVD, no thyromegaly   CVS: S1 S2 no murmur no rubs no gallop  RESP:  diffuse decreased breath sounds and wheeze, no rhonchi no rales  ABD: Soft, NT, ND, tympanic, no rebound or guarding   : No Barillas, No CVA tenderness   EXT; no pedal edema, no cyanosis  MSK: No ML spinal tenderness, normal ROM   NEURO: AAOX3, no new focal deficits     LABS/ IMAGING                        12.4   10.06 )-----------( 212      ( 27 Dec 2022 08:05 )             40.0     CARDIAC MARKERS ( 26 Dec 2022 15:10 )  x     / <0.01 ng/mL / x     / x     / x          12-27    141  |  103  |  17  ----------------------------<  70  3.8   |  23  |  0.7    Ca    9.0      27 Dec 2022 08:05  Mg     2.0     12-26    TPro  6.4  /  Alb  3.9  /  TBili  1.0  /  DBili  x   /  AST  23  /  ALT  14  /  AlkPhos  93  12-26    CAPILLARY BLOOD GLUCOSE          LIVER FUNCTIONS - ( 26 Dec 2022 15:10 )  Alb: 3.9 g/dL / Pro: 6.4 g/dL / ALK PHOS: 93 U/L / ALT: 14 U/L / AST: 23 U/L / GGT: x

## 2023-01-14 NOTE — PROGRESS NOTE ADULT - ASSESSMENT
82y F pmhx of DLD, hypothyroid, chronic back pain, hx cerebral aneurysm s/p coiling (2010) p/w upper back pain and SOB.  Pt states that for months she has been having SOB on exertion, she had a cardiac work up with no clear explantation for her dyspnea. She states it has progressively worsened acutely a few days ago.    Diffuse interstitial opacities - chronic ILD vs atypical infection/viral infection or possible COPD exacerbation  - quit smoking 60 yrs ago smoked 5 yrs 1/2 ppd  - Prednisone  - Procal normal  - ID consult appreciated  - pulmonary consult  - mucinex, incentive spirometry   - RVP neg  -CT angio PE: no PE, slight patchy GGO diffusely   - repeat CXR today    Severe Thoracic compression fracture T12:  - as seen on CT C spine  -MR LS reviewed with neurosurgery, consult appreciated  -PT, pain mgmt consult  -Analgesia PRN    Obesity BMI 35  - wt loss per PCP    Hepatic cyst  - f/u OP     Chronic Back pain   - mgmt as above    Hx/o cerebral aneurysm s/p coiling   - no new deficits.     Hypothyroidism  - cont synthroid    HLD  -cont statin    DVT PX  - lovenox     Full code  
82y F pmhx of DLD, hypothyroid, chronic back pain, hx cerebral aneurysm s/p coiling (2010) p/w upper back pain and SOB.  Pt states that for months she has been having SOB on exertion, she had a cardiac work up with no clear explantation for her dyspnea. She states it has progressively worsened acutely a few days ago.    Diffuse interstitial opacities - chronic ILD vs atypical infection/viral infection or possible COPD exacerbation  - quit smoking 60 yrs ago smoked 5 yrs 1/2 ppd  - Prednisone  - Procal normal  - ID consult appreciated  - pulmonary consult  - mucinex, incentive spirometry   -CT angio PE: no PE, slight patchy GGO diffusely     Severe Thoracic compression fracture T12:  - as seen on CT C spine  -Discussed with neurosurgery, will get MR TS, patient will be seen by neurosurgery when at [place] for MRI  -Analgesia PRN    Obesity BMI 35  - wt loss per PCP    Hepatic cyst  - f/u OP     Chronic back pain   - Will get cervical and thoracic CT as patient states pain is acutely much worse    Hx/o cerebral aneurysm s/p coiling   - no new deficits.     Hypothyroidism  - cont synthroid    HLD  -cont statin    DVT PX  - lovenox     Full code  
82y F pmhx of DLD, hypothyroid, chronic back pain, hx cerebral aneurysm s/p coiling (2010) p/w upper back pain and SOB.  Pt states that for months she has been having SOB on exertion, she had a cardiac work up with no clear explantation for her dyspnea. She states it has progressively worsened acutely a few days ago.    Diffuse interstitial opacities - chronic ILD vs atypical infection/viral infection or possible COPD exacerbation  - quit smoking 60 yrs ago smoked 5 yrs 1/2 ppd  - Prednisone  - Procal normal  - ID consult appreciated  - pulmonary consult  - mucinex, incentive spirometry   -CT angio PE: no PE, slight patchy GGO diffusely     Severe Thoracic compression fracture T12:  - as seen on CT C spine  -MR LS reviewed with neurosurgery, consult appreciated  -PT, pain mgmt consult  -Analgesia PRN    Obesity BMI 35  - wt loss per PCP    Hepatic cyst  - f/u OP     Chronic Back pain   - mgmt as above    Hx/o cerebral aneurysm s/p coiling   - no new deficits.     Hypothyroidism  - cont synthroid    HLD  -cont statin    DVT PX  - lovenox     Full code  
82y F pmhx of DLD, hypothyroid, chronic back pain, hx cerebral aneurysm s/p coiling (2010) p/w upper back pain and SOB.  Pt states that for months she has been having SOB on exertion, she had a cardiac work up with no clear explantation for her dyspnea. She states it has progressively worsened acutely a few days ago.    Diffuse interstitial opacities - chronic ILD vs atypical infection/viral infection or possible COPD exacerbation  - quit smoking 60 yrs ago smoked 5 yrs 1/2 ppd  - Procal normal  - ID consult appreciated  - pulmonary consult appreciated  - mucinex, incentive spirometry   - RVP neg  -CT angio PE: no PE, slight patchy GGO diffusely   - remains on 02    Severe Thoracic compression fracture T12:  - as seen on CT C spine  -MR LS reviewed with neurosurgery, consult appreciated  -PT, pain mgmt consult appreciated  -PM&R consult appreciated-STR  -Analgesia PRN- switched to PO, tolerating    Obesity BMI 35  - wt loss per PCP    Hepatic cyst  - f/u OP     Chronic Back pain   - mgmt as above    Hx/o cerebral aneurysm s/p coiling   - no new deficits.     Hypothyroidism  - cont synthroid    HLD  -cont statin    DVT PX  - lovenox     Full code    DC  to Home with O2 going to daughters       
82y F pmhx of DLD, hypothyroid, chronic back pain, hx cerebral aneurysm s/p coiling (2010) p/w upper back pain and SOB.  Pt states that for months she has been having SOB on exertion, she had a cardiac work up with no clear explantation for her dyspnea. She states it has progressively worsened acutely a few days ago.    Diffuse interstitial opacities - chronic ILD vs atypical infection/viral infection or possible COPD exacerbation  - quit smoking 60 yrs ago smoked 5 yrs 1/2 ppd  - Procal normal  - ID consult appreciated  - pulmonary consult appreciated  - mucinex, incentive spirometry   - RVP neg  -CT angio PE: no PE, slight patchy GGO diffusely   - weaned off O2    Severe Thoracic compression fracture T12:  - as seen on CT C spine  -MR LS reviewed with neurosurgery, consult appreciated  -PT, pain mgmt consult appreciated  -PM&R consult appreciated-STR  -Analgesia PRN- switched to PO, tolerating    Obesity BMI 35  - wt loss per PCP    Hepatic cyst  - f/u OP     Chronic Back pain   - mgmt as above    Hx/o cerebral aneurysm s/p coiling   - no new deficits.     Hypothyroidism  - cont synthroid    HLD  -cont statin    DVT PX  - lovenox     Full code    DC  to STR    
82y F pmhx of DLD, hypothyroid, chronic back pain, hx cerebral aneurysm s/p coiling (2010) p/w upper back pain and SOB.  Pt states that for months she has been having SOB on exertion, she had a cardiac work up with no clear explantation for her dyspnea. She states it has progressively worsened acutely a few days ago.    Diffuse interstitial opacities - chronic ILD vs atypical infection/viral infection or possible COPD exacerbation  - quit smoking 60 yrs ago smoked 5 yrs 1/2 ppd  - Procal normal  - ID consult appreciated  - pulmonary consult appreciated  - mucinex, incentive spirometry   - RVP neg  -CT angio PE: no PE, slight patchy GGO diffusely   - weaned off O2    Severe Thoracic compression fracture T12:  - as seen on CT C spine  -MR LS reviewed with neurosurgery, consult appreciated  -PT, pain mgmt consult appreciated  -PM&R consult appreciated-STR  -Analgesia PRN- switched to PO, tolerating    Obesity BMI 35  - wt loss per PCP    Hepatic cyst  - f/u OP     Chronic Back pain   - mgmt as above    Hx/o cerebral aneurysm s/p coiling   - no new deficits.     Hypothyroidism  - cont synthroid    HLD  -cont statin    DVT PX  - lovenox     Full code    DC  to STR        
MEDICATIONS  (STANDING):  amLODIPine   Tablet 2.5 milliGRAM(s) Oral daily  atorvastatin 10 milliGRAM(s) Oral at bedtime  azithromycin  IVPB 500 milliGRAM(s) IV Intermittent every 24 hours  cefTRIAXone   IVPB 1000 milliGRAM(s) IV Intermittent every 24 hours  enoxaparin Injectable 40 milliGRAM(s) SubCutaneous every 24 hours  levothyroxine 25 MICROGram(s) Oral daily  methylPREDNISolone sodium succinate Injectable 40 milliGRAM(s) IV Push every 8 hours    MEDICATIONS  (PRN):  acetaminophen     Tablet .. 650 milliGRAM(s) Oral every 6 hours PRN Temp greater or equal to 38C (100.4F), Mild Pain (1 - 3)  albuterol    90 MICROgram(s) HFA Inhaler 1 Puff(s) Inhalation every 6 hours PRN Shortness of Breath  albuterol/ipratropium for Nebulization 3 milliLiter(s) Nebulizer every 6 hours PRN Shortness of Breath  morphine  - Injectable 4 milliGRAM(s) IV Push every 4 hours PRN Severe Pain (7 - 10)  traMADol 50 milliGRAM(s) Oral every 4 hours PRN Moderate Pain (4 - 6)  zolpidem 5 milliGRAM(s) Oral at bedtime PRN Insomnia    Assessment /Plan   Diffuse interstitial opacities - chronic ILD vs atypical infection/viral infection or possible COPD exacerbation  - quit smoking 60 yrs ago smoked 5 yrs 1/2 ppd  - start solumedrol 40 mg TID, MDI scheduled, prn nebs, cont ceftriaxone and azithromycin for now.   - Pending PCT  - ID consult  - mucinex, incentive spirometry   -CT angio PE: no PE, slight patchy GGO diffusely     Obesity BMI 35  - wt loss per PCP    Hepatic cyst  - f/u OP     Chronic back pain   - stable     Hx/o cerebral aneurysm s/p coiling   - no new deficits.     Hypothyroidism  - cont synthroid    HLD  -cont statin    DVT PX  - lovenox     Full code    DISPO: Viral pna, or atypical bacterial pneumonia on likely COPD, will need outpatient PFTs at baseline.  pending PCT.  PCT  may be normal in aspiration pna, viral or atypical bacterial pna.  On o2 for comfor.t 
82y F pmhx of DLD, hypothyroid, chronic back pain, hx cerebral aneurysm s/p coiling (2010) p/w upper back pain and SOB.  Pt states that for months she has been having SOB on exertion, she had a cardiac work up with no clear explantation for her dyspnea. She states it has progressively worsened acutely a few days ago.    Diffuse interstitial opacities - chronic ILD vs atypical infection/viral infection or possible COPD exacerbation  - quit smoking 60 yrs ago smoked 5 yrs 1/2 ppd  - Prednisone  - Procal normal  - ID consult appreciated  - mucinex, incentive spirometry   -CT angio PE: no PE, slight patchy GGO diffusely     Obesity BMI 35  - wt loss per PCP    Hepatic cyst  - f/u OP     Chronic back pain   - Will get cervical and thoracic CT as patient states pain is acutely much worse    Hx/o cerebral aneurysm s/p coiling   - no new deficits.     Hypothyroidism  - cont synthroid    HLD  -cont statin    DVT PX  - lovenox     Full code
82y F pmhx of DLD, hypothyroid, chronic back pain, hx cerebral aneurysm s/p coiling (2010) p/w upper back pain and SOB.  Pt states that for months she has been having SOB on exertion, she had a cardiac work up with no clear explantation for her dyspnea. She states it has progressively worsened acutely a few days ago.    Diffuse interstitial opacities - chronic ILD vs atypical infection/viral infection or possible COPD exacerbation  - quit smoking 60 yrs ago smoked 5 yrs 1/2 ppd  - Prednisone  - Procal normal  - ID consult appreciated  - pulmonary consult  - mucinex, incentive spirometry   -CT angio PE: no PE, slight patchy GGO diffusely     Severe Thoracic compression fracture T12:  - as seen on CT C spine  -Discussed with neurosurgery, will get MR TS, patient will be seen by neurosurgery when at [place] for MRI  -Analgesia PRN    Obesity BMI 35  - wt loss per PCP    Hepatic cyst  - f/u OP     Chronic back pain   - Will get cervical and thoracic CT as patient states pain is acutely much worse    Hx/o cerebral aneurysm s/p coiling   - no new deficits.     Hypothyroidism  - cont synthroid    HLD  -cont statin    DVT PX  - lovenox     Full code  
82y F pmhx of DLD, hypothyroid, chronic back pain, hx cerebral aneurysm s/p coiling (2010) p/w upper back pain and SOB.  Pt states that for months she has been having SOB on exertion, she had a cardiac work up with no clear explantation for her dyspnea. She states it has progressively worsened acutely a few days ago.    Diffuse interstitial opacities - chronic ILD vs atypical infection/viral infection or possible COPD exacerbation  - quit smoking 60 yrs ago smoked 5 yrs 1/2 ppd  - Procal normal  - ID consult appreciated  - pulmonary consult appreciated  - mucinex, incentive spirometry   - RVP neg  -CT angio PE: no PE, slight patchy GGO diffusely   - remains on 02    Severe Thoracic compression fracture T12:  - as seen on CT C spine  -MR LS reviewed with neurosurgery, consult appreciated  -PT, pain mgmt consult appreciated  -PM&R consult appreciated-STR  -Analgesia PRN- switched to PO, tolerating    Obesity BMI 35  - wt loss per PCP    Hepatic cyst  - f/u OP     Chronic Back pain   - mgmt as above    Hx/o cerebral aneurysm s/p coiling   - no new deficits.     Hypothyroidism  - cont synthroid    HLD  -cont statin    DVT PX  - lovenox     Full code    DC  to Home with O2    
82y F pmhx of DLD, hypothyroid, chronic back pain, hx cerebral aneurysm s/p coiling (2010) p/w upper back pain and SOB.  Pt states that for months she has been having SOB on exertion, she had a cardiac work up with no clear explantation for her dyspnea. She states it has progressively worsened acutely a few days ago.    Diffuse interstitial opacities - chronic ILD vs atypical infection/viral infection or possible COPD exacerbation  - quit smoking 60 yrs ago smoked 5 yrs 1/2 ppd  - Procal normal  - ID consult appreciated  - pulmonary consult appreciated  - mucinex, incentive spirometry   - RVP neg  -CT angio PE: no PE, slight patchy GGO diffusely   - remains on 02    Severe Thoracic compression fracture T12:  - as seen on CT C spine  -MR LS reviewed with neurosurgery, consult appreciated  -PT, pain mgmt consult appreciated  -PM&R consult appreciated-STR  -Analgesia PRN- switched to PO, tolerating    Obesity BMI 35  - wt loss per PCP    Hepatic cyst  - f/u OP     Chronic Back pain   - mgmt as above    Hx/o cerebral aneurysm s/p coiling   - no new deficits.     Hypothyroidism  - cont synthroid    HLD  -cont statin    DVT PX  - lovenox     Full code    DC  to Home with O2 going to daughters     
82y F pmhx of DLD, hypothyroid, chronic back pain, hx cerebral aneurysm s/p coiling (2010) p/w upper back pain and SOB.  Pt states that for months she has been having SOB on exertion, she had a cardiac work up with no clear explantation for her dyspnea. She states it has progressively worsened acutely a few days ago.    Diffuse interstitial opacities - chronic ILD vs atypical infection/viral infection or possible COPD exacerbation  - quit smoking 60 yrs ago smoked 5 yrs 1/2 ppd  - Procal normal  - ID consult appreciated  - pulmonary consult appreciated  - mucinex, incentive spirometry   - RVP neg  -CT angio PE: no PE, slight patchy GGO diffusely   - remains on 02    Severe Thoracic compression fracture T12:  - as seen on CT C spine  -MR LS reviewed with neurosurgery, consult appreciated  -PT, pain mgmt consult appreciated  -PM&R consult appreciated-STR  -Analgesia PRN- switched to PO, tolerating    Obesity BMI 35  - wt loss per PCP    Hepatic cyst  - f/u OP     Chronic Back pain   - mgmt as above    Hx/o cerebral aneurysm s/p coiling   - no new deficits.     Hypothyroidism  - cont synthroid    HLD  -cont statin    DVT PX  - lovenox     Full code    DC  to Home with O2 going to daughters         
82y F pmhx of DLD, hypothyroid, chronic back pain, hx cerebral aneurysm s/p coiling (2010) p/w upper back pain and SOB.  Pt states that for months she has been having SOB on exertion, she had a cardiac work up with no clear explantation for her dyspnea. She states it has progressively worsened acutely a few days ago.    Diffuse interstitial opacities - chronic ILD vs atypical infection/viral infection or possible COPD exacerbation  - quit smoking 60 yrs ago smoked 5 yrs 1/2 ppd  - Procal normal  - ID consult appreciated  - pulmonary consult appreciated  - mucinex, incentive spirometry   - RVP neg  -CT angio PE: no PE, slight patchy GGO diffusely   - weaned O2  - repeat CXR    Severe Thoracic compression fracture T12:  - as seen on CT C spine  -MR LS reviewed with neurosurgery, consult appreciated  -PT, pain mgmt consult appreciated  -PM&R consult appreciated-STR  -Analgesia PRN- switched to PO only, will adjust as needed    Obesity BMI 35  - wt loss per PCP    Hepatic cyst  - f/u OP     Chronic Back pain   - mgmt as above    Hx/o cerebral aneurysm s/p coiling   - no new deficits.     Hypothyroidism  - cont synthroid    HLD  -cont statin    DVT PX  - lovenox     Full code    DC planning to STR
82y F pmhx of DLD, hypothyroid, chronic back pain, hx cerebral aneurysm s/p coiling (2010) p/w upper back pain and SOB.  Pt states that for months she has been having SOB on exertion, she had a cardiac work up with no clear explantation for her dyspnea. She states it has progressively worsened acutely a few days ago.    Diffuse interstitial opacities - chronic ILD vs atypical infection/viral infection or possible COPD exacerbation  - quit smoking 60 yrs ago smoked 5 yrs 1/2 ppd  - Procal normal  - ID consult appreciated  - pulmonary consult appreciated  - mucinex, incentive spirometry   - RVP neg  -CT angio PE: no PE, slight patchy GGO diffusely   - weaned off O2    Severe Thoracic compression fracture T12:  - as seen on CT C spine  -MR LS reviewed with neurosurgery, consult appreciated  -PT, pain mgmt consult appreciated  -PM&R consult appreciated-STR  -Analgesia PRN- switched to PO, tolerating    Obesity BMI 35  - wt loss per PCP    Hepatic cyst  - f/u OP     Chronic Back pain   - mgmt as above    Hx/o cerebral aneurysm s/p coiling   - no new deficits.     Hypothyroidism  - cont synthroid    HLD  -cont statin    DVT PX  - lovenox     Full code    DC  to STR  
82y F pmhx of DLD, hypothyroid, chronic back pain, hx cerebral aneurysm s/p coiling (2010) p/w upper back pain and SOB.  Pt states that for months she has been having SOB on exertion, she had a cardiac work up with no clear explantation for her dyspnea. She states it has progressively worsened acutely a few days ago.    Diffuse interstitial opacities - chronic ILD vs atypical infection/viral infection or possible COPD exacerbation  - quit smoking 60 yrs ago smoked 5 yrs 1/2 ppd  - Procal normal  - ID consult appreciated  - pulmonary consult appreciated  - mucinex, incentive spirometry   - RVP neg  -CT angio PE: no PE, slight patchy GGO diffusely   - weaned off O2    Severe Thoracic compression fracture T12:  - as seen on CT C spine  -MR LS reviewed with neurosurgery, consult appreciated  -PT, pain mgmt consult appreciated  -PM&R consult appreciated-STR  -Analgesia PRN- switched to PO, tolerating    Obesity BMI 35  - wt loss per PCP    Hepatic cyst  - f/u OP     Chronic Back pain   - mgmt as above    Hx/o cerebral aneurysm s/p coiling   - no new deficits.     Hypothyroidism  - cont synthroid    HLD  -cont statin    DVT PX  - lovenox     Full code    DC  to STR today bed available follow with case management     
IMPRESSION  81 yo female with acute hypoxic respiratory failure due to ILD/COPD with exacerbation  Possible viral infection as trigger  Adequate O2 saturation on 2 lpm NC  Back pain, compression fracture  Confusion but non focal exam    RECOMMENDATIONS  Continue O2 to goal sat 92-96%  Continue bronchodilator therapy  Taper off steroids  [facility]22 showed mild to moderate upper lobe predominant interstitial/linear opacities  PFTs done June 2022 show mild to moderate restriction with decreased TLC and DLCO  Analgesia/pain management  Undergoing evaluation of confusion and anxiety - may be related to effects of medical therapy    Please call with any questions  [phone]  
82y F pmhx of DLD, hypothyroid, chronic back pain, hx cerebral aneurysm s/p coiling (2010) p/w upper back pain and SOB.  Pt states that for months she has been having SOB on exertion, she had a cardiac work up with no clear explantation for her dyspnea. She states it has progressively worsened acutely a few days ago.    Diffuse interstitial opacities - chronic ILD vs atypical infection/viral infection or possible COPD exacerbation  - quit smoking 60 yrs ago smoked 5 yrs 1/2 ppd  - Prednisone  - Procal normal  - ID consult appreciated  - pulmonary consult  - mucinex, incentive spirometry   - RVP neg  -CT angio PE: no PE, slight patchy GGO diffusely   - wean O2  - repeat CXR    Severe Thoracic compression fracture T12:  - as seen on CT C spine  -MR LS reviewed with neurosurgery, consult appreciated  -PT, pain mgmt consult  -PM&R consult  -Analgesia PRN    Obesity BMI 35  - wt loss per PCP    Hepatic cyst  - f/u OP     Chronic Back pain   - mgmt as above    Hx/o cerebral aneurysm s/p coiling   - no new deficits.     Hypothyroidism  - cont synthroid    HLD  -cont statin    DVT PX  - lovenox     Full code
82y F pmhx of DLD, hypothyroid, chronic back pain, hx cerebral aneurysm s/p coiling (2010) p/w upper back pain and SOB.  Pt states that for months she has been having SOB on exertion, she had a cardiac work up with no clear explantation for her dyspnea. She states it has progressively worsened acutely a few days ago.    Diffuse interstitial opacities - chronic ILD vs atypical infection/viral infection or possible COPD exacerbation  - quit smoking 60 yrs ago smoked 5 yrs 1/2 ppd  - Procal normal  - ID consult appreciated  - pulmonary consult appreciated  - mucinex, incentive spirometry   - RVP neg  -CT angio PE: no PE, slight patchy GGO diffusely   - weaned off O2    Severe Thoracic compression fracture T12:  - as seen on CT C spine  -MR LS reviewed with neurosurgery, consult appreciated  -PT, pain mgmt consult appreciated  -PM&R consult appreciated-STR  -Analgesia PRN- switched to PO, tolerating    Obesity BMI 35  - wt loss per PCP    Hepatic cyst  - f/u OP     Chronic Back pain   - mgmt as above    Hx/o cerebral aneurysm s/p coiling   - no new deficits.     Hypothyroidism  - cont synthroid    HLD  -cont statin    DVT PX  - lovenox     Full code    DC  to STR today bed available follow with case management           
82y F pmhx of DLD, hypothyroid, chronic back pain, hx cerebral aneurysm s/p coiling (2010) p/w upper back pain and SOB.  Pt states that for months she has been having SOB on exertion, she had a cardiac work up with no clear explantation for her dyspnea. She states it has progressively worsened acutely a few days ago.    Diffuse interstitial opacities - chronic ILD vs atypical infection/viral infection or possible COPD exacerbation  - quit smoking 60 yrs ago smoked 5 yrs 1/2 ppd  - Procal normal  - ID consult appreciated  - pulmonary consult appreciated  - mucinex, incentive spirometry   - RVP neg  -CT angio PE: no PE, slight patchy GGO diffusely   - weaned off O2    Severe Thoracic compression fracture T12:  - as seen on CT C spine  -MR LS reviewed with neurosurgery, consult appreciated  -PT, pain mgmt consult appreciated  -PM&R consult appreciated-STR  -Analgesia PRN- switched to PO, tolerating    Obesity BMI 35  - wt loss per PCP    Hepatic cyst  - f/u OP     Chronic Back pain   - mgmt as above    Hx/o cerebral aneurysm s/p coiling   - no new deficits.     Hypothyroidism  - cont synthroid    HLD  -cont statin    DVT PX  - lovenox     Full code    DC planning to STR-medically stable
82y F pmhx of DLD, hypothyroid, chronic back pain, hx cerebral aneurysm s/p coiling (2010) p/w upper back pain and SOB.  Pt states that for months she has been having SOB on exertion, she had a cardiac work up with no clear explantation for her dyspnea. She states it has progressively worsened acutely a few days ago.    Diffuse interstitial opacities - chronic ILD vs atypical infection/viral infection or possible COPD exacerbation  - quit smoking 60 yrs ago smoked 5 yrs 1/2 ppd  - Procal normal  - ID consult appreciated  - pulmonary consult appreciated  - mucinex, incentive spirometry   - RVP neg  -CT angio PE: no PE, slight patchy GGO diffusely   - weaned off O2    Severe Thoracic compression fracture T12:  - as seen on CT C spine  -MR LS reviewed with neurosurgery, consult appreciated  -PT, pain mgmt consult appreciated  -PM&R consult appreciated-STR  -Analgesia PRN- switched to PO, tolerating    Obesity BMI 35  - wt loss per PCP    Hepatic cyst  - f/u OP     Chronic Back pain   - mgmt as above    Hx/o cerebral aneurysm s/p coiling   - no new deficits.     Hypothyroidism  - cont synthroid    HLD  -cont statin    DVT PX  - lovenox     Full code    DC planning to STR-medically stable  
acute hypoxic respiratory failure  ILD/COPD with exacerbation  possible viral infection with acute bronchitis  back pain      low flow oxygen, monitor SpO2; assess for home oxygen prior to discharge  bronchodilators  slow prednisone taper  [facility] showed mild to moderate upper lobe predominant interstitial/linear opacities  PFTs done june 2022 show mild to moderate restriction with decreased TLC and DLCO  analgesia/pain management  prognosis guarded

## 2023-01-14 NOTE — CHART NOTE - NSCHARTNOTESELECT_GEN_ALL_CORE
Event Note
Neurosurgery/Event Note
Off Service Note
brief note
brief note
change in MS

## 2023-01-16 ENCOUNTER — INPATIENT (INPATIENT)
Facility: HOSPITAL | Age: 83
LOS: 18 days | Discharge: NURSING, REHAB OR LONG-TERM CARE | End: 2023-02-04
Attending: HOSPITALIST | Admitting: HOSPITALIST
Payer: COMMERCIAL

## 2023-01-16 VITALS
HEIGHT: 61 IN | WEIGHT: 169.98 LBS | TEMPERATURE: 97 F | DIASTOLIC BLOOD PRESSURE: 70 MMHG | RESPIRATION RATE: 24 BRPM | HEART RATE: 103 BPM | OXYGEN SATURATION: 100 % | SYSTOLIC BLOOD PRESSURE: 112 MMHG

## 2023-01-16 DIAGNOSIS — Z90.49 ACQUIRED ABSENCE OF OTHER SPECIFIED PARTS OF DIGESTIVE TRACT: Chronic | ICD-10-CM

## 2023-01-16 DIAGNOSIS — Z98.890 OTHER SPECIFIED POSTPROCEDURAL STATES: Chronic | ICD-10-CM

## 2023-01-16 PROBLEM — E78.5 HYPERLIPIDEMIA, UNSPECIFIED: Chronic | Status: ACTIVE | Noted: 2022-12-26

## 2023-01-16 PROBLEM — E03.9 HYPOTHYROIDISM, UNSPECIFIED: Chronic | Status: ACTIVE | Noted: 2022-12-26

## 2023-01-16 PROBLEM — I67.1 CEREBRAL ANEURYSM, NONRUPTURED: Chronic | Status: ACTIVE | Noted: 2022-12-26

## 2023-01-16 LAB
ALBUMIN SERPL ELPH-MCNC: 3.4 G/DL — LOW (ref 3.5–5.2)
ALP SERPL-CCNC: 119 U/L — HIGH (ref 30–115)
ALT FLD-CCNC: 19 U/L — SIGNIFICANT CHANGE UP (ref 0–41)
ANION GAP SERPL CALC-SCNC: 11 MMOL/L — SIGNIFICANT CHANGE UP (ref 7–14)
APTT BLD: 26.3 SEC — LOW (ref 27–39.2)
AST SERPL-CCNC: 32 U/L — SIGNIFICANT CHANGE UP (ref 0–41)
BASE EXCESS BLDV CALC-SCNC: 4.6 MMOL/L — HIGH (ref -2–3)
BASOPHILS # BLD AUTO: 0.02 K/UL — SIGNIFICANT CHANGE UP (ref 0–0.2)
BASOPHILS NFR BLD AUTO: 0.2 % — SIGNIFICANT CHANGE UP (ref 0–1)
BILIRUB SERPL-MCNC: 0.8 MG/DL — SIGNIFICANT CHANGE UP (ref 0.2–1.2)
BUN SERPL-MCNC: 7 MG/DL — LOW (ref 10–20)
CA-I SERPL-SCNC: 1.05 MMOL/L — LOW (ref 1.15–1.33)
CALCIUM SERPL-MCNC: 8.5 MG/DL — SIGNIFICANT CHANGE UP (ref 8.4–10.5)
CHLORIDE SERPL-SCNC: 100 MMOL/L — SIGNIFICANT CHANGE UP (ref 98–110)
CO2 SERPL-SCNC: 28 MMOL/L — SIGNIFICANT CHANGE UP (ref 17–32)
CREAT SERPL-MCNC: 0.6 MG/DL — LOW (ref 0.7–1.5)
EGFR: 91 ML/MIN/1.73M2 — SIGNIFICANT CHANGE UP
EOSINOPHIL # BLD AUTO: 0.03 K/UL — SIGNIFICANT CHANGE UP (ref 0–0.7)
EOSINOPHIL NFR BLD AUTO: 0.3 % — SIGNIFICANT CHANGE UP (ref 0–8)
FLUAV AG NPH QL: SIGNIFICANT CHANGE UP
FLUBV AG NPH QL: SIGNIFICANT CHANGE UP
GAS PNL BLDV: 134 MMOL/L — LOW (ref 136–145)
GAS PNL BLDV: SIGNIFICANT CHANGE UP
GLUCOSE SERPL-MCNC: 93 MG/DL — SIGNIFICANT CHANGE UP (ref 70–99)
HCO3 BLDV-SCNC: 30 MMOL/L — HIGH (ref 22–29)
HCT VFR BLD CALC: 36.3 % — LOW (ref 37–47)
HCT VFR BLDA CALC: 36 % — LOW (ref 39–51)
HGB BLD CALC-MCNC: 11.9 G/DL — LOW (ref 12.6–17.4)
HGB BLD-MCNC: 11.5 G/DL — LOW (ref 12–16)
IMM GRANULOCYTES NFR BLD AUTO: 0.4 % — HIGH (ref 0.1–0.3)
INR BLD: 0.97 RATIO — SIGNIFICANT CHANGE UP (ref 0.65–1.3)
LACTATE BLDV-MCNC: 1.4 MMOL/L — SIGNIFICANT CHANGE UP (ref 0.5–2)
LYMPHOCYTES # BLD AUTO: 0.85 K/UL — LOW (ref 1.2–3.4)
LYMPHOCYTES # BLD AUTO: 9.5 % — LOW (ref 20.5–51.1)
MAGNESIUM SERPL-MCNC: 1.8 MG/DL — SIGNIFICANT CHANGE UP (ref 1.8–2.4)
MCHC RBC-ENTMCNC: 30.3 PG — SIGNIFICANT CHANGE UP (ref 27–31)
MCHC RBC-ENTMCNC: 31.7 G/DL — LOW (ref 32–37)
MCV RBC AUTO: 95.8 FL — SIGNIFICANT CHANGE UP (ref 81–99)
MONOCYTES # BLD AUTO: 0.61 K/UL — HIGH (ref 0.1–0.6)
MONOCYTES NFR BLD AUTO: 6.8 % — SIGNIFICANT CHANGE UP (ref 1.7–9.3)
NEUTROPHILS # BLD AUTO: 7.4 K/UL — HIGH (ref 1.4–6.5)
NEUTROPHILS NFR BLD AUTO: 82.8 % — HIGH (ref 42.2–75.2)
NRBC # BLD: 0 /100 WBCS — SIGNIFICANT CHANGE UP (ref 0–0)
NT-PROBNP SERPL-SCNC: 592 PG/ML — HIGH (ref 0–300)
PCO2 BLDV: 46 MMHG — HIGH (ref 39–42)
PH BLDV: 7.42 — SIGNIFICANT CHANGE UP (ref 7.32–7.43)
PLATELET # BLD AUTO: 224 K/UL — SIGNIFICANT CHANGE UP (ref 130–400)
PO2 BLDV: 25 MMHG — SIGNIFICANT CHANGE UP
POTASSIUM BLDV-SCNC: 3.3 MMOL/L — LOW (ref 3.5–5.1)
POTASSIUM SERPL-MCNC: 3.7 MMOL/L — SIGNIFICANT CHANGE UP (ref 3.5–5)
POTASSIUM SERPL-SCNC: 3.7 MMOL/L — SIGNIFICANT CHANGE UP (ref 3.5–5)
PROT SERPL-MCNC: 5.6 G/DL — LOW (ref 6–8)
PROTHROM AB SERPL-ACNC: 11 SEC — SIGNIFICANT CHANGE UP (ref 9.95–12.87)
RBC # BLD: 3.79 M/UL — LOW (ref 4.2–5.4)
RBC # FLD: 14 % — SIGNIFICANT CHANGE UP (ref 11.5–14.5)
RSV RNA NPH QL NAA+NON-PROBE: SIGNIFICANT CHANGE UP
SAO2 % BLDV: 44.4 % — SIGNIFICANT CHANGE UP
SARS-COV-2 RNA SPEC QL NAA+PROBE: DETECTED
SODIUM SERPL-SCNC: 139 MMOL/L — SIGNIFICANT CHANGE UP (ref 135–146)
TROPONIN T SERPL-MCNC: <0.01 NG/ML — SIGNIFICANT CHANGE UP
WBC # BLD: 8.95 K/UL — SIGNIFICANT CHANGE UP (ref 4.8–10.8)
WBC # FLD AUTO: 8.95 K/UL — SIGNIFICANT CHANGE UP (ref 4.8–10.8)

## 2023-01-16 PROCEDURE — 71045 X-RAY EXAM CHEST 1 VIEW: CPT | Mod: 26

## 2023-01-16 PROCEDURE — 93970 EXTREMITY STUDY: CPT | Mod: 26

## 2023-01-16 PROCEDURE — 99285 EMERGENCY DEPT VISIT HI MDM: CPT

## 2023-01-16 PROCEDURE — 93010 ELECTROCARDIOGRAM REPORT: CPT

## 2023-01-16 PROCEDURE — 99223 1ST HOSP IP/OBS HIGH 75: CPT

## 2023-01-16 RX ORDER — ENOXAPARIN SODIUM 100 MG/ML
40 INJECTION SUBCUTANEOUS ONCE
Refills: 0 | Status: COMPLETED | OUTPATIENT
Start: 2023-01-16 | End: 2023-01-16

## 2023-01-16 RX ORDER — SENNA PLUS 8.6 MG/1
2 TABLET ORAL AT BEDTIME
Refills: 0 | Status: DISCONTINUED | OUTPATIENT
Start: 2023-01-16 | End: 2023-02-04

## 2023-01-16 RX ORDER — LEVOTHYROXINE SODIUM 125 MCG
25 TABLET ORAL DAILY
Refills: 0 | Status: DISCONTINUED | OUTPATIENT
Start: 2023-01-16 | End: 2023-02-04

## 2023-01-16 RX ORDER — MORPHINE SULFATE 50 MG/1
6 CAPSULE, EXTENDED RELEASE ORAL ONCE
Refills: 0 | Status: DISCONTINUED | OUTPATIENT
Start: 2023-01-16 | End: 2023-01-16

## 2023-01-16 RX ORDER — OXYCODONE HYDROCHLORIDE 5 MG/1
20 TABLET ORAL THREE TIMES A DAY
Refills: 0 | Status: DISCONTINUED | OUTPATIENT
Start: 2023-01-16 | End: 2023-01-17

## 2023-01-16 RX ORDER — LIDOCAINE 4 G/100G
1 CREAM TOPICAL DAILY
Refills: 0 | Status: DISCONTINUED | OUTPATIENT
Start: 2023-01-16 | End: 2023-02-04

## 2023-01-16 RX ORDER — REMDESIVIR 5 MG/ML
200 INJECTION INTRAVENOUS EVERY 24 HOURS
Refills: 0 | Status: COMPLETED | OUTPATIENT
Start: 2023-01-16 | End: 2023-01-16

## 2023-01-16 RX ORDER — HYDROXYZINE HCL 10 MG
25 TABLET ORAL AT BEDTIME
Refills: 0 | Status: DISCONTINUED | OUTPATIENT
Start: 2023-01-16 | End: 2023-01-25

## 2023-01-16 RX ORDER — MORPHINE SULFATE 50 MG/1
4 CAPSULE, EXTENDED RELEASE ORAL ONCE
Refills: 0 | Status: DISCONTINUED | OUTPATIENT
Start: 2023-01-16 | End: 2023-01-16

## 2023-01-16 RX ORDER — REMDESIVIR 5 MG/ML
INJECTION INTRAVENOUS
Refills: 0 | Status: DISCONTINUED | OUTPATIENT
Start: 2023-01-16 | End: 2023-01-24

## 2023-01-16 RX ORDER — AMLODIPINE BESYLATE 2.5 MG/1
2.5 TABLET ORAL DAILY
Refills: 0 | Status: DISCONTINUED | OUTPATIENT
Start: 2023-01-16 | End: 2023-02-04

## 2023-01-16 RX ORDER — ALBUTEROL 90 UG/1
0 AEROSOL, METERED ORAL
Qty: 0 | Refills: 0 | DISCHARGE

## 2023-01-16 RX ORDER — POLYETHYLENE GLYCOL 3350 17 G/17G
17 POWDER, FOR SOLUTION ORAL DAILY
Refills: 0 | Status: DISCONTINUED | OUTPATIENT
Start: 2023-01-16 | End: 2023-02-04

## 2023-01-16 RX ORDER — ALBUTEROL 90 UG/1
2 AEROSOL, METERED ORAL EVERY 6 HOURS
Refills: 0 | Status: DISCONTINUED | OUTPATIENT
Start: 2023-01-16 | End: 2023-01-30

## 2023-01-16 RX ORDER — LEVOTHYROXINE SODIUM 125 MCG
0 TABLET ORAL
Qty: 0 | Refills: 0 | DISCHARGE

## 2023-01-16 RX ORDER — ACETAMINOPHEN 500 MG
650 TABLET ORAL EVERY 6 HOURS
Refills: 0 | Status: DISCONTINUED | OUTPATIENT
Start: 2023-01-16 | End: 2023-01-30

## 2023-01-16 RX ORDER — TIOTROPIUM BROMIDE 18 UG/1
2 CAPSULE ORAL; RESPIRATORY (INHALATION) DAILY
Refills: 0 | Status: DISCONTINUED | OUTPATIENT
Start: 2023-01-16 | End: 2023-02-04

## 2023-01-16 RX ORDER — DEXAMETHASONE 0.5 MG/5ML
6 ELIXIR ORAL ONCE
Refills: 0 | Status: COMPLETED | OUTPATIENT
Start: 2023-01-16 | End: 2023-01-16

## 2023-01-16 RX ORDER — PANTOPRAZOLE SODIUM 20 MG/1
40 TABLET, DELAYED RELEASE ORAL
Refills: 0 | Status: DISCONTINUED | OUTPATIENT
Start: 2023-01-16 | End: 2023-02-04

## 2023-01-16 RX ORDER — GABAPENTIN 400 MG/1
300 CAPSULE ORAL THREE TIMES A DAY
Refills: 0 | Status: DISCONTINUED | OUTPATIENT
Start: 2023-01-16 | End: 2023-02-04

## 2023-01-16 RX ORDER — ATORVASTATIN CALCIUM 80 MG/1
20 TABLET, FILM COATED ORAL AT BEDTIME
Refills: 0 | Status: DISCONTINUED | OUTPATIENT
Start: 2023-01-16 | End: 2023-02-04

## 2023-01-16 RX ORDER — BUDESONIDE AND FORMOTEROL FUMARATE DIHYDRATE 160; 4.5 UG/1; UG/1
2 AEROSOL RESPIRATORY (INHALATION)
Refills: 0 | Status: DISCONTINUED | OUTPATIENT
Start: 2023-01-16 | End: 2023-02-04

## 2023-01-16 RX ORDER — REMDESIVIR 5 MG/ML
100 INJECTION INTRAVENOUS EVERY 24 HOURS
Refills: 0 | Status: DISCONTINUED | OUTPATIENT
Start: 2023-01-17 | End: 2023-01-24

## 2023-01-16 RX ADMIN — OXYCODONE HYDROCHLORIDE 20 MILLIGRAM(S): 5 TABLET ORAL at 21:01

## 2023-01-16 RX ADMIN — BUDESONIDE AND FORMOTEROL FUMARATE DIHYDRATE 2 PUFF(S): 160; 4.5 AEROSOL RESPIRATORY (INHALATION) at 21:01

## 2023-01-16 RX ADMIN — MORPHINE SULFATE 6 MILLIGRAM(S): 50 CAPSULE, EXTENDED RELEASE ORAL at 14:00

## 2023-01-16 RX ADMIN — MORPHINE SULFATE 4 MILLIGRAM(S): 50 CAPSULE, EXTENDED RELEASE ORAL at 15:55

## 2023-01-16 RX ADMIN — GABAPENTIN 300 MILLIGRAM(S): 400 CAPSULE ORAL at 21:02

## 2023-01-16 RX ADMIN — ENOXAPARIN SODIUM 40 MILLIGRAM(S): 100 INJECTION SUBCUTANEOUS at 21:03

## 2023-01-16 RX ADMIN — ATORVASTATIN CALCIUM 20 MILLIGRAM(S): 80 TABLET, FILM COATED ORAL at 17:43

## 2023-01-16 RX ADMIN — MORPHINE SULFATE 6 MILLIGRAM(S): 50 CAPSULE, EXTENDED RELEASE ORAL at 12:51

## 2023-01-16 RX ADMIN — REMDESIVIR 200 MILLIGRAM(S): 5 INJECTION INTRAVENOUS at 20:17

## 2023-01-16 RX ADMIN — Medication 100 MILLIGRAM(S): at 16:14

## 2023-01-16 RX ADMIN — Medication 6 MILLIGRAM(S): at 18:21

## 2023-01-16 NOTE — ED PROVIDER NOTE - NSICDXPASTMEDICALHX_GEN_ALL_CORE_FT
PAST MEDICAL HISTORY:  Brain aneurysm     Compression fracture of spine     Hyperlipemia     Hypothyroid     Presence of IVC filter

## 2023-01-16 NOTE — ED PROVIDER NOTE - CARDIAC, MLM
normal... Well appearing, well nourished, awake, alert, oriented to person, place, time/situation and in no apparent distress. Normal rate, regular rhythm.  Heart sounds S1, S2.  No murmurs, rubs or gallops.

## 2023-01-16 NOTE — PATIENT PROFILE ADULT - NSPROHMSYMPCOND_GEN_A_NUR
Addended by: SAMIR HARGROVE on: 1/19/2020 05:27 PM     Modules accepted: Orders    
cardiovascular/musculoskeletal

## 2023-01-16 NOTE — ED PROVIDER NOTE - CARE PLAN
1 Principal Discharge DX:	2019 novel coronavirus disease (COVID-19)  Secondary Diagnosis:	Weakness  Secondary Diagnosis:	Pain in joint, lower leg  Secondary Diagnosis:	Leg swelling

## 2023-01-16 NOTE — ED PROVIDER NOTE - MUSCULOSKELETAL, MLM
Spine appears normal, range of motion is not limited, no muscle or joint tenderness. mild lower extremity swelling

## 2023-01-16 NOTE — ED PROVIDER NOTE - ATTENDING APP SHARED VISIT CONTRIBUTION OF CARE
82-year-old female past medical history of brain aneurysm, hyperlipidemia, history of interstitial lung disease recently diagnosed, CT 2 compression fracture presents from home via EMS for evaluation of increased shortness of breath.  Patient with recent admission for shortness of breath.  Was discharged on home oxygen 2 days ago.  Visiting nurse saw patient today for the first time and felt that patient had increased work of breathing.  Family at bedside states that patient cannot be home secondary to having difficulty ambulating secondary to pain and are requesting admission for rehab.  On exam patient in NAD, AAOx3, able to speak full clear sentences, lungs CTA B/L, abdomen soft nontender nondistended, positive bilateral pedal edema, OP clear

## 2023-01-16 NOTE — H&P ADULT - NSHPPHYSICALEXAM_GEN_ALL_CORE
VITALS:   T(C): 35.9 (01-16-23 @ 11:55), Max: 35.9 (01-16-23 @ 11:55)  HR: 76 (01-16-23 @ 15:15) (76 - 103)  BP: 117/72 (01-16-23 @ 15:15) (107/56 - 117/72)  RR: 22 (01-16-23 @ 15:15) (22 - 24)  SpO2: 98% (01-16-23 @ 15:15) (98% - 100%) on 3Lnc    GENERAL: NAD, lying in bed comfortably  HEAD:  Atraumatic, normocephalic  EYES: EOMI, PERRLA, conjunctiva and sclera clear  ENT: Moist mucous membranes  HEART: Regular rate and rhythm, no murmurs, rubs, or gallops  LUNGS: Clear to auscultation bilaterally, no crackles, wheezing, or rhonchi. on 3L nc  ABDOMEN: obese, Soft, nontender, nondistended, +BS  EXTREMITIES: pitting edema b/l lower extremities  NERVOUS SYSTEM:  A&Ox3, no focal deficits

## 2023-01-16 NOTE — ED PROVIDER NOTE - CLINICAL SUMMARY MEDICAL DECISION MAKING FREE TEXT BOX
Patient with interstitial disease on oxygen at home.  We will continue oxygen here.  Patient incidentally found to be COVID-positive.  Duplex was obtained and pulmonary report is negative for DVT.  Chest x-ray interpreted by me is unchanged from previous.  Results discussed with patient and family.  Patient will require admission to the hospital at this time.  Patient has a high risk for decompensation due to COVID-19 secondary to underlying disease

## 2023-01-16 NOTE — ED PROVIDER NOTE - OBJECTIVE STATEMENT
patient brought in by family, C/o Dc'd from hospital 2 days ago, Admitted for SOB, with final Dx intersitial lung Dz, Kilo'd with home O2   C/o worsening leg, back pain, leg swelling, fatigue, chills, difficulty ambulating due to pain, Request admission for rehab,

## 2023-01-16 NOTE — PATIENT PROFILE ADULT - FALL HARM RISK - HARM RISK INTERVENTIONS

## 2023-01-16 NOTE — ED ADULT TRIAGE NOTE - CHIEF COMPLAINT QUOTE
BIBA for back pain and SOB. Pt arrived with NRB. EMS states pt was recently discharged from the hospital with a compound fx T12

## 2023-01-16 NOTE — H&P ADULT - NSHPLABSRESULTS_GEN_ALL_CORE
11.5   8.95  )-----------( 224      ( 16 Jan 2023 12:20 )             36.3       01-16    139  |  100  |  7<L>  ----------------------------<  93  3.7   |  28  |  0.6<L>    Ca    8.5      16 Jan 2023 12:20  Mg     1.8     01-16    TPro  5.6<L>  /  Alb  3.4<L>  /  TBili  0.8  /  DBili  x   /  AST  32  /  ALT  19  /  AlkPhos  119<H>  01-16          Magnesium, Serum: 1.8 mg/dL (01-16-23 @ 12:20)    PT/INR - ( 16 Jan 2023 12:20 )   PT: 11.00 sec;   INR: 0.97 ratio         PTT - ( 16 Jan 2023 12:20 )  PTT:26.3 sec    Lactate Trend      CARDIAC MARKERS ( 16 Jan 2023 12:20 )  x     / <0.01 ng/mL / x     / x     / x          < from: [facility] Duplex Lower Ext Vein Scan, Bilat (01.16.23 @ 15:16) >  Impression:  No evidence of deep venous thrombosis or superficial thrombophlebitis in   the bilateral lower extremities. Limited evaluation of the calf veins.      < from: Xray Chest 1 View- PORTABLE-Urgent (01.16.23 @ 12:48) >  IMPRESSION:  Left midlung bandlike opacity. Lung volumes. No focal consolidation   otherwise. No pneumothorax or pleural effusion.  Stable cardiomediastinal silhouette.  Unchanged osseous structures.

## 2023-01-16 NOTE — ED PROVIDER NOTE - NS ED ATTENDING STATEMENT MOD
This was a shared visit with the [facility]. I reviewed and verified the documentation and independently performed the documented:

## 2023-01-16 NOTE — H&P ADULT - ASSESSMENT
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 Patient lives at home alone, and is unable to care for herself. She had a visiting nurse service once since discharge who agreed the patient is unsafe to be living at home. Patient was found to be covid positive upon admission. She states that she has a slight sore throat and had one loose BM today, but does not feel any more short of breath than she she has been since last admission. Patient also states that over the last month, she has been having swelling of both lower legs. She denies chest pain/nausea/vomiting/ abdominal pain. Her back pain has been controlled on 20mg oxycodone.     #covid19  -admit to medicine  - isolation precautions  - RDV  - daily labs  - inflammatory markers  - dexa    #vertebral compression fracture  #back pain   - pain control  - PT consult  - CM/social work    # h/o HTN   c/w amlodipine     # h/o DLD  - c/w statin     #diet - DASH   #DVT ppx    82 year old female with past medical history of DLD, ILD on 2-3L home O2, compression fx thoracic spine, IVC filter, and hypothyroidism presents after recent discharge for shortness of breath and back pain. Patient lives at home alone, and is unable to care for herself. She had a visiting nurse service once since discharge who agreed the patient is unsafe to be living at home. Patient was found to be covid positive upon admission. She states that she has a slight sore throat and had one loose BM today, but does not feel any more short of breath than she she has been since last admission. Patient also states that over the last month, she has been having swelling of both lower legs. She denies chest pain/nausea/vomiting/ abdominal pain. Her back pain has been controlled on 20mg oxycodone.     #covid19  -admit to medicine  - isolation precautions  - RDV  - daily labs  - inflammatory markers  - dexa    #vertebral compression fracture  #back pain   - pain control  - PT consult  - CM/social work    # h/o HTN   c/w amlodipine     # h/o DLD  - c/w statin     #diet - DASH   #DVT ppx     -Plan at recent discharge (Dr. Hilliard was covering for hospitalist service; he is not her PMD) was for STR however apparently patient's insurance lapsed.  She tried to go home with PT program but was not able to care for herself.  Her family is working on insurance and she will need to be admitted here until a safe discharge plan can be put in to place.

## 2023-01-16 NOTE — H&P ADULT - HISTORY OF PRESENT ILLNESS
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 Patient lives at home alone, and is unable to care for herself. She had a visiting nurse service once since discharge who agreed the patient is unsafe to be living at home. Patient was found to be covid positive upon admission. She states that she has a slight sore throat and had one loose BM today, but does not feel any more short of breath than she she has been since last admission. Patient also states that over the last month, she has been having swelling of both lower legs. She denies chest pain/nausea/vomiting/ abdominal pain. Her back pain has been controlled on 20mg oxycodone.

## 2023-01-17 LAB
ALBUMIN SERPL ELPH-MCNC: 3.4 G/DL — LOW (ref 3.5–5.2)
ALP SERPL-CCNC: 113 U/L — SIGNIFICANT CHANGE UP (ref 30–115)
ALT FLD-CCNC: 18 U/L — SIGNIFICANT CHANGE UP (ref 0–41)
ANION GAP SERPL CALC-SCNC: 11 MMOL/L — SIGNIFICANT CHANGE UP (ref 7–14)
AST SERPL-CCNC: 26 U/L — SIGNIFICANT CHANGE UP (ref 0–41)
BASOPHILS # BLD AUTO: 0 K/UL — SIGNIFICANT CHANGE UP (ref 0–0.2)
BASOPHILS NFR BLD AUTO: 0 % — SIGNIFICANT CHANGE UP (ref 0–1)
BILIRUB SERPL-MCNC: 0.6 MG/DL — SIGNIFICANT CHANGE UP (ref 0.2–1.2)
BUN SERPL-MCNC: 11 MG/DL — SIGNIFICANT CHANGE UP (ref 10–20)
CALCIUM SERPL-MCNC: 8 MG/DL — LOW (ref 8.4–10.5)
CHLORIDE SERPL-SCNC: 98 MMOL/L — SIGNIFICANT CHANGE UP (ref 98–110)
CO2 SERPL-SCNC: 27 MMOL/L — SIGNIFICANT CHANGE UP (ref 17–32)
CREAT SERPL-MCNC: 0.7 MG/DL — SIGNIFICANT CHANGE UP (ref 0.7–1.5)
CRP SERPL-MCNC: 54 MG/L — HIGH
D DIMER BLD IA.RAPID-MCNC: 648 NG/ML DDU — HIGH
EGFR: 87 ML/MIN/1.73M2 — SIGNIFICANT CHANGE UP
EOSINOPHIL # BLD AUTO: 0 K/UL — SIGNIFICANT CHANGE UP (ref 0–0.7)
EOSINOPHIL NFR BLD AUTO: 0 % — SIGNIFICANT CHANGE UP (ref 0–8)
ERYTHROCYTE [SEDIMENTATION RATE] IN BLOOD: 35 MM/HR — HIGH (ref 0–20)
FERRITIN SERPL-MCNC: 305 NG/ML — HIGH (ref 15–150)
GLUCOSE SERPL-MCNC: 152 MG/DL — HIGH (ref 70–99)
HCT VFR BLD CALC: 33.2 % — LOW (ref 37–47)
HGB BLD-MCNC: 10.7 G/DL — LOW (ref 12–16)
IMM GRANULOCYTES NFR BLD AUTO: 0.3 % — SIGNIFICANT CHANGE UP (ref 0.1–0.3)
LDH SERPL L TO P-CCNC: 414 — HIGH (ref 50–242)
LYMPHOCYTES # BLD AUTO: 0.5 K/UL — LOW (ref 1.2–3.4)
LYMPHOCYTES # BLD AUTO: 13.7 % — LOW (ref 20.5–51.1)
MCHC RBC-ENTMCNC: 30.7 PG — SIGNIFICANT CHANGE UP (ref 27–31)
MCHC RBC-ENTMCNC: 32.2 G/DL — SIGNIFICANT CHANGE UP (ref 32–37)
MCV RBC AUTO: 95.4 FL — SIGNIFICANT CHANGE UP (ref 81–99)
MONOCYTES # BLD AUTO: 0.16 K/UL — SIGNIFICANT CHANGE UP (ref 0.1–0.6)
MONOCYTES NFR BLD AUTO: 4.4 % — SIGNIFICANT CHANGE UP (ref 1.7–9.3)
NEUTROPHILS # BLD AUTO: 2.98 K/UL — SIGNIFICANT CHANGE UP (ref 1.4–6.5)
NEUTROPHILS NFR BLD AUTO: 81.6 % — HIGH (ref 42.2–75.2)
NRBC # BLD: 0 /100 WBCS — SIGNIFICANT CHANGE UP (ref 0–0)
PLATELET # BLD AUTO: 218 K/UL — SIGNIFICANT CHANGE UP (ref 130–400)
POTASSIUM SERPL-MCNC: 4 MMOL/L — SIGNIFICANT CHANGE UP (ref 3.5–5)
POTASSIUM SERPL-SCNC: 4 MMOL/L — SIGNIFICANT CHANGE UP (ref 3.5–5)
PROCALCITONIN SERPL-MCNC: 0.12 NG/ML — HIGH (ref 0.02–0.1)
PROT SERPL-MCNC: 5.6 G/DL — LOW (ref 6–8)
RBC # BLD: 3.48 M/UL — LOW (ref 4.2–5.4)
RBC # FLD: 13.7 % — SIGNIFICANT CHANGE UP (ref 11.5–14.5)
SODIUM SERPL-SCNC: 136 MMOL/L — SIGNIFICANT CHANGE UP (ref 135–146)
WBC # BLD: 3.65 K/UL — LOW (ref 4.8–10.8)
WBC # FLD AUTO: 3.65 K/UL — LOW (ref 4.8–10.8)

## 2023-01-17 PROCEDURE — 99232 SBSQ HOSP IP/OBS MODERATE 35: CPT

## 2023-01-17 RX ORDER — HYDROMORPHONE HYDROCHLORIDE 2 MG/ML
4 INJECTION INTRAMUSCULAR; INTRAVENOUS; SUBCUTANEOUS EVERY 4 HOURS
Refills: 0 | Status: DISCONTINUED | OUTPATIENT
Start: 2023-01-17 | End: 2023-01-24

## 2023-01-17 RX ORDER — OXYCODONE HYDROCHLORIDE 5 MG/1
20 TABLET ORAL EVERY 4 HOURS
Refills: 0 | Status: DISCONTINUED | OUTPATIENT
Start: 2023-01-17 | End: 2023-01-17

## 2023-01-17 RX ORDER — KETOROLAC TROMETHAMINE 30 MG/ML
15 SYRINGE (ML) INJECTION EVERY 6 HOURS
Refills: 0 | Status: DISCONTINUED | OUTPATIENT
Start: 2023-01-17 | End: 2023-01-18

## 2023-01-17 RX ORDER — HEPARIN SODIUM 5000 [USP'U]/ML
5000 INJECTION INTRAVENOUS; SUBCUTANEOUS EVERY 12 HOURS
Refills: 0 | Status: DISCONTINUED | OUTPATIENT
Start: 2023-01-17 | End: 2023-01-24

## 2023-01-17 RX ADMIN — ATORVASTATIN CALCIUM 20 MILLIGRAM(S): 80 TABLET, FILM COATED ORAL at 21:20

## 2023-01-17 RX ADMIN — Medication 25 MICROGRAM(S): at 06:24

## 2023-01-17 RX ADMIN — HEPARIN SODIUM 5000 UNIT(S): 5000 INJECTION INTRAVENOUS; SUBCUTANEOUS at 17:02

## 2023-01-17 RX ADMIN — HYDROMORPHONE HYDROCHLORIDE 4 MILLIGRAM(S): 2 INJECTION INTRAMUSCULAR; INTRAVENOUS; SUBCUTANEOUS at 22:33

## 2023-01-17 RX ADMIN — OXYCODONE HYDROCHLORIDE 20 MILLIGRAM(S): 5 TABLET ORAL at 17:35

## 2023-01-17 RX ADMIN — TIOTROPIUM BROMIDE 2 PUFF(S): 18 CAPSULE ORAL; RESPIRATORY (INHALATION) at 09:15

## 2023-01-17 RX ADMIN — Medication 25 MILLIGRAM(S): at 01:49

## 2023-01-17 RX ADMIN — BUDESONIDE AND FORMOTEROL FUMARATE DIHYDRATE 2 PUFF(S): 160; 4.5 AEROSOL RESPIRATORY (INHALATION) at 08:06

## 2023-01-17 RX ADMIN — GABAPENTIN 300 MILLIGRAM(S): 400 CAPSULE ORAL at 13:39

## 2023-01-17 RX ADMIN — BUDESONIDE AND FORMOTEROL FUMARATE DIHYDRATE 2 PUFF(S): 160; 4.5 AEROSOL RESPIRATORY (INHALATION) at 21:20

## 2023-01-17 RX ADMIN — REMDESIVIR 200 MILLIGRAM(S): 5 INJECTION INTRAVENOUS at 21:24

## 2023-01-17 RX ADMIN — Medication 650 MILLIGRAM(S): at 01:49

## 2023-01-17 RX ADMIN — GABAPENTIN 300 MILLIGRAM(S): 400 CAPSULE ORAL at 21:21

## 2023-01-17 RX ADMIN — OXYCODONE HYDROCHLORIDE 20 MILLIGRAM(S): 5 TABLET ORAL at 03:18

## 2023-01-17 RX ADMIN — HYDROMORPHONE HYDROCHLORIDE 4 MILLIGRAM(S): 2 INJECTION INTRAMUSCULAR; INTRAVENOUS; SUBCUTANEOUS at 21:34

## 2023-01-17 RX ADMIN — Medication 100 MILLIGRAM(S): at 13:40

## 2023-01-17 RX ADMIN — PANTOPRAZOLE SODIUM 40 MILLIGRAM(S): 20 TABLET, DELAYED RELEASE ORAL at 06:24

## 2023-01-17 RX ADMIN — OXYCODONE HYDROCHLORIDE 20 MILLIGRAM(S): 5 TABLET ORAL at 11:11

## 2023-01-17 RX ADMIN — SENNA PLUS 2 TABLET(S): 8.6 TABLET ORAL at 21:22

## 2023-01-17 RX ADMIN — Medication 100 MILLIGRAM(S): at 22:13

## 2023-01-17 RX ADMIN — GABAPENTIN 300 MILLIGRAM(S): 400 CAPSULE ORAL at 06:24

## 2023-01-17 NOTE — PHYSICAL THERAPY INITIAL EVALUATION ADULT - SPECIFY REASON(S)
Having reviewed chart and discussed with Dr. Malhotra, it is recommended to use TLSO with PT. Will hold until brace arrives.

## 2023-01-17 NOTE — CONSULT NOTE ADULT - NSCONSULTADDITIONALINFOA_GEN_ALL_CORE
1. Continue Acetaminophen as Rx.  2. Substitute Hydromorphone for Oxycodone. D/C Oxycodone and start Hydromorphone at 4mg PO q 4 hours, PRN.  3. Continue Gabapentin as Rx.  4. Start Toradol 15 mg PO q 6 hours, as needed for breakthrough pain for no longer than 3 days.

## 2023-01-17 NOTE — CHART NOTE - NSCHARTNOTEFT_GEN_A_CORE
Called by Dr. Malhotra from [facility] for patient with < from: MR Thoracic Spine No Cont (12.30.22 @ 17:34) >    Redemonstrated multilevel compression deformities ofthe thoracic spine:  -Mild compression deformity of T4, likely chronic.  -Moderate compression deformity of T5 with trace retropulsion of the   vertebral cortex, likely subacute.  -Mild compression deformity of T9, likely subacute.  -Mild-moderate compression deformity of T11, likely chronic.  -Severe compression deformity of T12 with mild retropulsion of the   posterior cortex, likely acute on chronic. This results in mild   flattening of the ventral cord.    Well-defined focal cord signal abnormality at the level of T5 could   reflect myelomalacia versus focal cord edema possibly associated with   trace retropulsion of T5. Intramedullary mass is less likely but cannot   be completely excluded.    Multilevel thoracic spondylosis as above, more notable at T7-8 with   mild-moderate spinal stenosis with focal indentation in the right ventral   cord secondary to T7 right paracentral focal bony spurring versus   calcified superiorly extending T7-8 disc extrusion. No cord edema.    T12-L1 mild spinal stenosis associated with retropulsion of T12 posterior   cortex, and severe left/moderate right foraminal stenosis.    < end of copied text >      Pt readmitted for intractable pain. Neurosurgery consulted for possible Intervention.    Will D/W with Dr. Hwang Called by Dr. Malhotra from [facility] for patient with < from: MR Thoracic Spine No Cont (12.30.22 @ 17:34) >    Redemonstrated multilevel compression deformities ofthe thoracic spine:  -Mild compression deformity of T4, likely chronic.  -Moderate compression deformity of T5 with trace retropulsion of the   vertebral cortex, likely subacute.  -Mild compression deformity of T9, likely subacute.  -Mild-moderate compression deformity of T11, likely chronic.  -Severe compression deformity of T12 with mild retropulsion of the   posterior cortex, likely acute on chronic. This results in mild   flattening of the ventral cord.    Well-defined focal cord signal abnormality at the level of T5 could   reflect myelomalacia versus focal cord edema possibly associated with   trace retropulsion of T5. Intramedullary mass is less likely but cannot   be completely excluded.    Multilevel thoracic spondylosis as above, more notable at T7-8 with   mild-moderate spinal stenosis with focal indentation in the right ventral   cord secondary to T7 right paracentral focal bony spurring versus   calcified superiorly extending T7-8 disc extrusion. No cord edema.    T12-L1 mild spinal stenosis associated with retropulsion of T12 posterior   cortex, and severe left/moderate right foraminal stenosis.    < end of copied text >      Pt readmitted for intractable pain. Neurosurgery consulted for possible Intervention.    Will D/W with Dr. Hwang      Neurosurgery Attending Addendum:     Recommend continued medical management and pain control    Patient is covid positive, and had elevated D-Dimer- not a candidate for elective neurosurgical intervention at this time.  However, recommend medical optimization, work-up of active medical problems, supportive care for covid infection.      Recommend pain management consultation and PT/OT.      If not improved, recommend medical optimization/clearance, cardiology clearance and transfer to [facility] on 1/23/23 to undergo T5, T9, T12 kyphoplasty on 1/25/23 if patient agreeable to neurosurgical intervention. Called by Dr. Malhotra from [facility] for patient with < from: MR Thoracic Spine No Cont (12.30.22 @ 17:34) >    Redemonstrated multilevel compression deformities ofthe thoracic spine:  -Mild compression deformity of T4, likely chronic.  -Moderate compression deformity of T5 with trace retropulsion of the   vertebral cortex, likely subacute.  -Mild compression deformity of T9, likely subacute.  -Mild-moderate compression deformity of T11, likely chronic.  -Severe compression deformity of T12 with mild retropulsion of the   posterior cortex, likely acute on chronic. This results in mild   flattening of the ventral cord.    Well-defined focal cord signal abnormality at the level of T5 could   reflect myelomalacia versus focal cord edema possibly associated with   trace retropulsion of T5. Intramedullary mass is less likely but cannot   be completely excluded.    Multilevel thoracic spondylosis as above, more notable at T7-8 with   mild-moderate spinal stenosis with focal indentation in the right ventral   cord secondary to T7 right paracentral focal bony spurring versus   calcified superiorly extending T7-8 disc extrusion. No cord edema.    T12-L1 mild spinal stenosis associated with retropulsion of T12 posterior   cortex, and severe left/moderate right foraminal stenosis.    < end of copied text >      Pt readmitted for intractable pain. Neurosurgery consulted for possible Intervention.    Will D/W with Dr. Hwang      Neurosurgery Attending Addendum:     Recommend continued medical management and pain control    Patient is covid positive, and had elevated D-Dimer- not a candidate for elective neurosurgical intervention at this time.  However, recommend medical optimization, work-up of active medical problems, supportive care for covid infection.  Recommend PE/dvt w/u for elevated d-dimer, cardiology consult for elevated BNP.    Recommend pain management consultation and PT/OT.      If not improved, recommend medical optimization/clearance, cardiology clearance and if medically optimized and cleared to transfer to [facility] on 1/23/23 to undergo T5, T9, T12 kyphoplasty on 1/25/23 if patient agreeable to neurosurgical intervention. Called by Dr. Malhotra from [facility] for patient with < from: MR Thoracic Spine No Cont (12.30.22 @ 17:34) >    Redemonstrated multilevel compression deformities of the thoracic spine:  -Mild compression deformity of T4, likely chronic.  -Moderate compression deformity of T5 with trace retropulsion of the   vertebral cortex, likely subacute.  -Mild compression deformity of T9, likely subacute.  -Mild-moderate compression deformity of T11, likely chronic.  -Severe compression deformity of T12 with mild retropulsion of the   posterior cortex, likely acute on chronic. This results in mild   flattening of the ventral cord.    Well-defined focal cord signal abnormality at the level of T5 could   reflect myelomalacia versus focal cord edema possibly associated with   trace retropulsion of T5. Intramedullary mass is less likely but cannot   be completely excluded.    Multilevel thoracic spondylosis as above, more notable at T7-8 with   mild-moderate spinal stenosis with focal indentation in the right ventral   cord secondary to T7 right paracentral focal bony spurring versus   calcified superiorly extending T7-8 disc extrusion. No cord edema.    T12-L1 mild spinal stenosis associated with retropulsion of T12 posterior   cortex, and severe left/moderate right foraminal stenosis.    < end of copied text >      Pt readmitted for intractable pain. Neurosurgery consulted for possible Intervention.    Will D/W with Dr. Hwang      Neurosurgery Attending Addendum:     Recommend continued medical management and pain control if patient remains with good strength of bilateral upper and lower extremities.    Patient is covid positive, and had elevated D-Dimer- not a candidate for elective neurosurgical intervention at this time.  However, recommend medical optimization, work-up of active medical problems, supportive care for covid infection.  Recommend PE/dvt w/u for elevated d-dimer, cardiology consult for elevated BNP.    Recommend pain management consultation and PT/OT.      If not improved, recommend medical optimization/clearance, cardiology clearance and if medically optimized and cleared to transfer to [facility] on 1/23/23 to undergo T5, T9, T12 kyphoplasty on 1/25/23 if patient agreeable to neurosurgical intervention.

## 2023-01-17 NOTE — PROGRESS NOTE ADULT - ASSESSMENT
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 Patient lives at home alone, and is unable to care for herself. She had a visiting nurse service once since discharge who agreed the patient is unsafe to be living at home. Patient was found to be covid positive upon admission. She states that she has a slight sore throat and had one loose BM today, but does not feel any more short of breath than she she has been since last admission. Patient also states that over the last month, she has been having swelling of both lower legs. She denies chest pain/nausea/vomiting/ abdominal pain. Her back pain has been controlled on 20mg oxycodone.     # COVID19  # Recent atypical infection/viral infection vs possible COPD exacerbation  - quit smoking 60 yrs ago smoked 5 yrs 1/2 ppd  - previous admission: CT angio PE: no PE, slight patchy GGO diffusely   - Left midlung bandlike opacity. Lung volumes. No focal consolidation otherwise. No pneumothorax or pleural effusion.  PLAN  - isolation precautions  - RDV  - daily labs  - inflammatory markers  - dexa  - O2 supplement if needed    # Severe Thoracic compression fracture T12  # Severe back pain, limited ROM, unable to ambulate due to pain 2/2 severe compression fx  - MRI T-spine: Redemonstrated multilevel compression deformities of the thoracic spine: Mild compression deformity of T4, likely chronic. Moderate compression deformity of T5 with trace retropulsion of the vertebral cortex, likely subacute. Mild compression deformity of T9, likely subacute. Mild-moderate compression deformity of T11, likely chronic. Severe compression deformity of T12 with mild retropulsion of the posterior cortex, likely acute on chronic. This results in mild flattening of the ventral cord. Well-defined focal cord signal abnormality at the level of T5 could reflect myelomalacia versus focal cord edema possibly associated with trace retropulsion of T5. Intramedullary mass is less likely but cannot be completely excluded. Multilevel thoracic spondylosis (T7-8 with mild-moderate spinal stenosis with focal indentation in the right ventral cord secondary to T7 right paracentral focal bony spurring versus calcified superiorly extending T7-8 disc extrusion. No cord edema.T12-L1 mild spinal stenosis associated with retropulsion of T12 posterior cortex, and severe left/moderate right foraminal stenosis)  PLAN  - 12/31: Neuro surg eval: pain management No acute Neurosurgical intervention at this time . Recommend PT/OT and 6-8 weeks outpatient PT.  Recommend inpatient/outpatient pain management follow-up.  Follow up  in the office as an outpatient with Dr Hwang after above conservative measures if persistent pain  - Will get neurosurgery re-eval (patient not able to ambulate, still experiencing severe back pain despite pain management  - will get TLSO brace  - PT eval   - will order pain management eval   - currently on Oxycodone 20mg Q4H PRN  - gabapentin 300mg TID  - CM/social work  - was for STR however apparently patient's insurance lapsed.  She tried to go home with PT program but was not able to care for herself.  Her family is working on insurance and she will need to be admitted here until a safe discharge plan can be put in to place.    # Obesity BMI 35  - wt loss per PCP    # Hepatic cyst  - f/u OP     # Hx/o cerebral aneurysm s/p coiling   - no new deficits.     # Hypothyroidism  - cont synthroid    # h/o HTN   - c/w amlodipine     # h/o DLD  - c/w statin     diet - DASH   DVT ppx: heparin    Progress Note Handoff  Pending Consults: neurosurgery, PT  Pending Tests: none  Pending Results: clinical improvement  Family Discussion: Patient  Disposition: Home__Xacute__/SNF______/Other_____/Unknown at this time_____  Spent over 50 min reviewing chart, speaking with patient/family and on coordinating patient care during interdisciplinary rounds

## 2023-01-17 NOTE — CONSULT NOTE ADULT - SUBJECTIVE AND OBJECTIVE BOX
83 y/o female with PMH of High Cholesterol, Hypothyroidism and DVTs. Pt is on O2, via NC at home. She was D/C home on the 13th after pain management for back pain. Noncompliant with her pain meds., pt's pain worsened and she was readmitted on the 16th. Prior MRI discloses pathology from stenosis to spondylitis to compression deformities from T5 to L1(See Report). On exam, A and O X 4, pain scale is 7.5/10, on O2 via NC, VSS. On Oxycodone Gabapentin And Tylenol.

## 2023-01-17 NOTE — PROGRESS NOTE ADULT - SUBJECTIVE AND OBJECTIVE BOX
WILFREDO CHO  82y  Female      Patient is a 82y old  Female who presents with a chief complaint of     INTERVAL HPI/OVERNIGHT EVENTS:  Patient seen and examined earlier this morning; patient denies any new complaints. She reports of cough. Reports that every time she coughs, her back pain gets worse. Otherwise, no other new complaints.       REVIEW OF SYSTEMS:  CONSTITUTIONAL: No fever, weight loss, or fatigue  EYES: No eye pain, visual disturbances, or discharge  ENMT:  No difficulty hearing, tinnitus, vertigo  NECK: No pain or stiffness  RESPIRATORY: No cough, wheezing, chills or hemoptysis; +shortness of breath  CARDIOVASCULAR: No chest pain, palpitations, dizziness, or leg swelling  GASTROINTESTINAL: No abdominal or epigastric pain. No nausea, vomiting, or hematemesis; No diarrhea or constipation.  GENITOURINARY: No dysuria, frequency, hematuria, or incontinence  NEUROLOGICAL: No headaches, memory loss, loss of strength, numbness, or tremors  MUSCULOSKELETAL: +severe back pain  PSYCHIATRIC: No depression, anxiety, mood swings, or difficulty sleeping      T(C): 36.1 (01-17-23 @ 11:05), Max: 36.2 (01-17-23 @ 06:20)  HR: 90 (01-17-23 @ 06:20) (72 - 90)  BP: 121/72 (01-17-23 @ 11:05) (107/56 - 121/72)  RR: 20 (01-17-23 @ 11:05) (18 - 22)  SpO2: 99% (01-17-23 @ 09:00) (98% - 99%)    PHYSICAL EXAM:  GENERAL: NAD, obese  HEAD:  Atraumatic, Normocephalic  EYES: EOMI, PERRLA, conjunctiva and sclera clear  ENMT: No tonsillar erythema, exudates, or enlargement; Moist mucous membranes  NECK: Supple, No JVD, Normal thyroid  NERVOUS SYSTEM:  Alert & Oriented X3, Good concentration; Motor Strength 5/5 B/L upper and lower extremities  CHEST/LUNG: Clear to percussion bilaterally; No rales, rhonchi, wheezing, or rubs  HEART: Regular rate and rhythm; No murmurs, rubs, or gallops  ABDOMEN: Soft, Nontender, Nondistended; Bowel sounds present  EXTREMITIES:  2+ Peripheral Pulses, No clubbing, cyanosis, or edema  BACK: ROM limited due to pain      Consultant(s) Notes Reviewed:  [x ] YES  [ ] NO  Care Discussed with Consultants/Other Providers [ x] YES  [ ] NO    LAB:                        10.7   3.65  )-----------( 218      ( 17 Jan 2023 07:57 )             33.2     01-17    136  |  98  |  11  ----------------------------<  152<H>  4.0   |  27  |  0.7    Ca    8.0<L>      17 Jan 2023 07:57  Mg     1.8     01-16    TPro  5.6<L>  /  Alb  3.4<L>  /  TBili  0.6  /  DBili  x   /  AST  26  /  ALT  18  /  AlkPhos  113  01-17    LIVER FUNCTIONS - ( 17 Jan 2023 07:57 )  Alb: 3.4 g/dL / Pro: 5.6 g/dL / ALK PHOS: 113 U/L / ALT: 18 U/L / AST: 26 U/L / GGT: x           CARDIAC MARKERS ( 16 Jan 2023 12:20 )  x     / <0.01 ng/mL / x     / x     / x                  Drug Dosing Weight  Height (cm): 154.9 (16 Jan 2023 11:55)  Weight (kg): 77.1 (16 Jan 2023 11:55)  BMI (kg/m2): 32.1 (16 Jan 2023 11:55)  BSA (m2): 1.76 (16 Jan 2023 11:55)    CAPILLARY BLOOD GLUCOSE        I&O's Summary        RADIOLOGY & ADDITIONAL TESTS:  Imaging Personally Reviewed:  [x] YES  [ ] NO    HEALTH ISSUES - PROBLEM Dx:          MEDS:  acetaminophen     Tablet .. 650 milliGRAM(s) Oral every 6 hours PRN  albuterol    90 MICROgram(s) HFA Inhaler 2 Puff(s) Inhalation every 6 hours PRN  amLODIPine   Tablet 2.5 milliGRAM(s) Oral daily  atorvastatin 20 milliGRAM(s) Oral at bedtime  benzonatate 100 milliGRAM(s) Oral every 8 hours  budesonide 160 MICROgram(s)/formoterol 4.5 MICROgram(s) Inhaler 2 Puff(s) Inhalation two times a day  gabapentin 300 milliGRAM(s) Oral three times a day  heparin   Injectable 5000 Unit(s) SubCutaneous every 12 hours  hydrOXYzine hydrochloride 25 milliGRAM(s) Oral at bedtime PRN  levothyroxine 25 MICROGram(s) Oral daily  lidocaine   4% Patch 1 Patch Transdermal daily  oxyCODONE    IR 20 milliGRAM(s) Oral every 4 hours PRN  pantoprazole    Tablet 40 milliGRAM(s) Oral before breakfast  polyethylene glycol 3350 17 Gram(s) Oral daily  remdesivir  IVPB   IV Intermittent   remdesivir  IVPB 100 milliGRAM(s) IV Intermittent every 24 hours  senna 2 Tablet(s) Oral at bedtime  tiotropium 2.5 MICROgram(s) Inhaler 2 Puff(s) Inhalation daily

## 2023-01-18 LAB
ALBUMIN SERPL ELPH-MCNC: 3.4 G/DL — LOW (ref 3.5–5.2)
ALP SERPL-CCNC: 103 U/L — SIGNIFICANT CHANGE UP (ref 30–115)
ALT FLD-CCNC: 17 U/L — SIGNIFICANT CHANGE UP (ref 0–41)
ANION GAP SERPL CALC-SCNC: 9 MMOL/L — SIGNIFICANT CHANGE UP (ref 7–14)
AST SERPL-CCNC: 22 U/L — SIGNIFICANT CHANGE UP (ref 0–41)
BILIRUB SERPL-MCNC: 0.5 MG/DL — SIGNIFICANT CHANGE UP (ref 0.2–1.2)
BUN SERPL-MCNC: 12 MG/DL — SIGNIFICANT CHANGE UP (ref 10–20)
CALCIUM SERPL-MCNC: 8 MG/DL — LOW (ref 8.4–10.5)
CHLORIDE SERPL-SCNC: 98 MMOL/L — SIGNIFICANT CHANGE UP (ref 98–110)
CO2 SERPL-SCNC: 31 MMOL/L — SIGNIFICANT CHANGE UP (ref 17–32)
CREAT SERPL-MCNC: 0.8 MG/DL — SIGNIFICANT CHANGE UP (ref 0.7–1.5)
EGFR: 74 ML/MIN/1.73M2 — SIGNIFICANT CHANGE UP
GLUCOSE SERPL-MCNC: 95 MG/DL — SIGNIFICANT CHANGE UP (ref 70–99)
HCT VFR BLD CALC: 33.5 % — LOW (ref 37–47)
HGB BLD-MCNC: 10.5 G/DL — LOW (ref 12–16)
MCHC RBC-ENTMCNC: 30.5 PG — SIGNIFICANT CHANGE UP (ref 27–31)
MCHC RBC-ENTMCNC: 31.3 G/DL — LOW (ref 32–37)
MCV RBC AUTO: 97.4 FL — SIGNIFICANT CHANGE UP (ref 81–99)
NRBC # BLD: 0 /100 WBCS — SIGNIFICANT CHANGE UP (ref 0–0)
PLATELET # BLD AUTO: 239 K/UL — SIGNIFICANT CHANGE UP (ref 130–400)
POTASSIUM SERPL-MCNC: 4.2 MMOL/L — SIGNIFICANT CHANGE UP (ref 3.5–5)
POTASSIUM SERPL-SCNC: 4.2 MMOL/L — SIGNIFICANT CHANGE UP (ref 3.5–5)
PROT SERPL-MCNC: 5.3 G/DL — LOW (ref 6–8)
RBC # BLD: 3.44 M/UL — LOW (ref 4.2–5.4)
RBC # FLD: 14.1 % — SIGNIFICANT CHANGE UP (ref 11.5–14.5)
SODIUM SERPL-SCNC: 138 MMOL/L — SIGNIFICANT CHANGE UP (ref 135–146)
WBC # BLD: 7.11 K/UL — SIGNIFICANT CHANGE UP (ref 4.8–10.8)
WBC # FLD AUTO: 7.11 K/UL — SIGNIFICANT CHANGE UP (ref 4.8–10.8)

## 2023-01-18 PROCEDURE — 99232 SBSQ HOSP IP/OBS MODERATE 35: CPT

## 2023-01-18 RX ORDER — KETOROLAC TROMETHAMINE 30 MG/ML
15 SYRINGE (ML) INJECTION EVERY 6 HOURS
Refills: 0 | Status: DISCONTINUED | OUTPATIENT
Start: 2023-01-18 | End: 2023-01-18

## 2023-01-18 RX ADMIN — POLYETHYLENE GLYCOL 3350 17 GRAM(S): 17 POWDER, FOR SOLUTION ORAL at 12:06

## 2023-01-18 RX ADMIN — GABAPENTIN 300 MILLIGRAM(S): 400 CAPSULE ORAL at 05:19

## 2023-01-18 RX ADMIN — HYDROMORPHONE HYDROCHLORIDE 4 MILLIGRAM(S): 2 INJECTION INTRAMUSCULAR; INTRAVENOUS; SUBCUTANEOUS at 18:16

## 2023-01-18 RX ADMIN — TIOTROPIUM BROMIDE 2 PUFF(S): 18 CAPSULE ORAL; RESPIRATORY (INHALATION) at 09:30

## 2023-01-18 RX ADMIN — Medication 25 MICROGRAM(S): at 05:18

## 2023-01-18 RX ADMIN — HYDROMORPHONE HYDROCHLORIDE 4 MILLIGRAM(S): 2 INJECTION INTRAMUSCULAR; INTRAVENOUS; SUBCUTANEOUS at 05:29

## 2023-01-18 RX ADMIN — AMLODIPINE BESYLATE 2.5 MILLIGRAM(S): 2.5 TABLET ORAL at 05:19

## 2023-01-18 RX ADMIN — HEPARIN SODIUM 5000 UNIT(S): 5000 INJECTION INTRAVENOUS; SUBCUTANEOUS at 17:02

## 2023-01-18 RX ADMIN — PANTOPRAZOLE SODIUM 40 MILLIGRAM(S): 20 TABLET, DELAYED RELEASE ORAL at 05:20

## 2023-01-18 RX ADMIN — Medication 100 MILLIGRAM(S): at 13:23

## 2023-01-18 RX ADMIN — HEPARIN SODIUM 5000 UNIT(S): 5000 INJECTION INTRAVENOUS; SUBCUTANEOUS at 05:19

## 2023-01-18 RX ADMIN — BUDESONIDE AND FORMOTEROL FUMARATE DIHYDRATE 2 PUFF(S): 160; 4.5 AEROSOL RESPIRATORY (INHALATION) at 09:30

## 2023-01-18 RX ADMIN — GABAPENTIN 300 MILLIGRAM(S): 400 CAPSULE ORAL at 13:24

## 2023-01-18 RX ADMIN — Medication 100 MILLIGRAM(S): at 05:18

## 2023-01-18 NOTE — PHYSICAL THERAPY INITIAL EVALUATION ADULT - PATIENT/FAMILY/SIGNIFICANT OTHER GOALS STATEMENT, PT EVAL
I want to get stronger and back to my daily activities
I want to get stronger and back to my daily activities

## 2023-01-18 NOTE — PROGRESS NOTE ADULT - SUBJECTIVE AND OBJECTIVE BOX
WILFREDO CHO  82y  Female      Patient is a 82y old  Female who presents with a chief complaint of     INTERVAL HPI/OVERNIGHT EVENTS:  Patient seen and examined earlier this morning; patient denies any new complaints. She reports of severe back pain, reports pain medication is helping. Otherwise, no other new complaints.       REVIEW OF SYSTEMS:  CONSTITUTIONAL: No fever, weight loss, or fatigue  EYES: No eye pain, visual disturbances, or discharge  ENMT:  No difficulty hearing, tinnitus, vertigo  NECK: No pain or stiffness  RESPIRATORY: No cough, wheezing, chills or hemoptysis; +shortness of breath  CARDIOVASCULAR: No chest pain, palpitations, dizziness, or leg swelling  GASTROINTESTINAL: No abdominal or epigastric pain. No nausea, vomiting, or hematemesis; No diarrhea or constipation.  GENITOURINARY: No dysuria, frequency, hematuria, or incontinence  NEUROLOGICAL: No headaches, memory loss, loss of strength, numbness, or tremors  MUSCULOSKELETAL: +severe back pain  PSYCHIATRIC: No depression, anxiety, mood swings, or difficulty sleeping      T(C): 36.1 (01-17-23 @ 11:05), Max: 36.2 (01-17-23 @ 06:20)  HR: 90 (01-17-23 @ 06:20) (72 - 90)  BP: 121/72 (01-17-23 @ 11:05) (107/56 - 121/72)  RR: 20 (01-17-23 @ 11:05) (18 - 22)  SpO2: 99% (01-17-23 @ 09:00) (98% - 99%)    PHYSICAL EXAM:  GENERAL: NAD, obese  HEAD:  Atraumatic, Normocephalic  EYES: EOMI, PERRLA, conjunctiva and sclera clear  ENMT: No tonsillar erythema, exudates, or enlargement; Moist mucous membranes  NECK: Supple, No JVD, Normal thyroid  NERVOUS SYSTEM:  Alert & Oriented X3, Good concentration; Motor Strength 5/5 B/L upper and lower extremities  CHEST/LUNG: Clear to percussion bilaterally; No rales, rhonchi, wheezing, or rubs  HEART: Regular rate and rhythm; No murmurs, rubs, or gallops  ABDOMEN: Soft, Nontender, Nondistended; Bowel sounds present  EXTREMITIES:  2+ Peripheral Pulses, No clubbing, cyanosis, or edema  BACK: ROM limited due to pain      Consultant(s) Notes Reviewed:  [x ] YES  [ ] NO  Care Discussed with Consultants/Other Providers [ x] YES  [ ] NO    LAB:                        10.5   7.11  )-----------( 239      ( 18 Jan 2023 05:30 )             33.5   01-18    138  |  98  |  12  ----------------------------<  95  4.2   |  31  |  0.8    Ca    8.0<L>      18 Jan 2023 05:30    TPro  5.3<L>  /  Alb  3.4<L>  /  TBili  0.5  /  DBili  x   /  AST  22  /  ALT  17  /  AlkPhos  103  01-18          Drug Dosing Weight  Height (cm): 154.9 (16 Jan 2023 11:55)  Weight (kg): 77.1 (16 Jan 2023 11:55)  BMI (kg/m2): 32.1 (16 Jan 2023 11:55)  BSA (m2): 1.76 (16 Jan 2023 11:55)    CAPILLARY BLOOD GLUCOSE        I&O's Summary        RADIOLOGY & ADDITIONAL TESTS:  Imaging Personally Reviewed:  [x] YES  [ ] NO    HEALTH ISSUES - PROBLEM Dx:        MEDICATIONS  (STANDING):  amLODIPine   Tablet 2.5 milliGRAM(s) Oral daily  atorvastatin 20 milliGRAM(s) Oral at bedtime  benzonatate 100 milliGRAM(s) Oral every 8 hours  budesonide 160 MICROgram(s)/formoterol 4.5 MICROgram(s) Inhaler 2 Puff(s) Inhalation two times a day  gabapentin 300 milliGRAM(s) Oral three times a day  heparin   Injectable 5000 Unit(s) SubCutaneous every 12 hours  levothyroxine 25 MICROGram(s) Oral daily  lidocaine   4% Patch 1 Patch Transdermal daily  pantoprazole    Tablet 40 milliGRAM(s) Oral before breakfast  polyethylene glycol 3350 17 Gram(s) Oral daily  remdesivir  IVPB   IV Intermittent   remdesivir  IVPB 100 milliGRAM(s) IV Intermittent every 24 hours  senna 2 Tablet(s) Oral at bedtime  tiotropium 2.5 MICROgram(s) Inhaler 2 Puff(s) Inhalation daily    MEDICATIONS  (PRN):  acetaminophen     Tablet .. 650 milliGRAM(s) Oral every 6 hours PRN Temp greater or equal to 38C (100.4F), Mild Pain (1 - 3)  albuterol    90 MICROgram(s) HFA Inhaler 2 Puff(s) Inhalation every 6 hours PRN Bronchospasm  HYDROmorphone   Tablet 4 milliGRAM(s) Oral every 4 hours PRN Severe Pain (7 - 10)  hydrOXYzine hydrochloride 25 milliGRAM(s) Oral at bedtime PRN Restlessness  ketorolac   Injectable 15 milliGRAM(s) IV Push every 6 hours PRN breakthrough pain

## 2023-01-18 NOTE — PROGRESS NOTE ADULT - ASSESSMENT
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 Patient lives at home alone, and is unable to care for herself. She had a visiting nurse service once since discharge who agreed the patient is unsafe to be living at home. Patient was found to be covid positive upon admission. She states that she has a slight sore throat and had one loose BM today, but does not feel any more short of breath than she she has been since last admission. Patient also states that over the last month, she has been having swelling of both lower legs. She denies chest pain/nausea/vomiting/ abdominal pain. Her back pain has been controlled on 20mg oxycodone.     # COVID19  # Recent atypical infection/viral infection vs possible COPD exacerbation  - quit smoking 60 yrs ago smoked 5 yrs 1/2 ppd  - previous admission: CT angio PE: no PE, slight patchy GGO diffusely   - Left midlung bandlike opacity. Lung volumes. No focal consolidation otherwise. No pneumothorax or pleural effusion.  PLAN  - isolation precautions  - RDV  - daily labs  - inflammatory markers  - dexa  - O2 supplement if needed  - incentive spiromter    # Severe Thoracic compression fracture T12  # Severe back pain, limited ROM, difficulty with ambulation due to pain 2/2 severe compression fx  - MRI T-spine: multilevel compression deformities of the thoracic spine: Mild compression deformity of T4, likely chronic. Moderate compression deformity of T5 with trace retropulsion of the vertebral cortex, likely subacute. Mild compression deformity of T9, likely subacute. Mild-moderate compression deformity of T11, likely chronic. Severe compression deformity of T12 with mild retropulsion of the posterior cortex, likely acute on chronic. This results in mild flattening of the ventral cord. Well-defined focal cord signal abnormality at the level of T5 could reflect myelomalacia versus focal cord edema possibly associated with trace retropulsion of T5. Intramedullary mass is less likely but cannot be completely excluded. Multilevel thoracic spondylosis (T7-8 with mild-moderate spinal stenosis with focal indentation in the right ventral cord secondary to T7 right paracentral focal bony spurring versus calcified superiorly extending T7-8 disc extrusion. No cord edema.T12-L1 mild spinal stenosis associated with retropulsion of T12 posterior cortex, and severe left/moderate right foraminal stenosis)  PLAN  - Neurosurgery re-eval appreciated: recommended continued medical management and pain control if patient remains with good strength of bilateral upper and lower extremities -> PT/OT eval  - Per neurosurgery,  if not improved, recommend medical optimization/clearance, cardiology clearance and if medically optimized and cleared to transfer to [facility] on 1/23/23 to undergo T5, T9, T12 kyphoplasty on 1/25/23- will get cardio and pulm eval for risk stratification (suspecting patient likely will need surg next week; patient would like kyphoplasty to be done while she is inpatient)   - Per conversation with pain management: patient likely needs neurosurg intervention inpatient; recommended to obtain pre-op risk stratification from pulm and cardio while patient is inhouse  - Pain management eval appreciated: dilaudid PRN, gabapentin, toradol PRN for breakthrough pain  - TLSO brace and PT eval  - CM/social work  - was for STR however apparently patient's insurance lapsed.  She tried to go home with PT program but was not able to care for herself.  Her family is working on insurance and she will need to be admitted here until a safe discharge plan can be put in to place.    # Obesity BMI 35  - wt loss per PCP    # Hepatic cyst  - f/u OP     # Hx/o cerebral aneurysm s/p coiling   - no new deficits.     # Hypothyroidism  - cont synthroid    # h/o HTN   - c/w amlodipine     # h/o DLD  - c/w statin     Diet - DASH   DVT ppx: heparin    Progress Note Handoff  Pending Consults: cardio and pulm  Pending Tests: none  Pending Results: clinical improvement  Family Discussion: Patient  Disposition: Home__Xacute__/SNF______/Other_____/Unknown at this time_____  Spent over 50 min reviewing chart, speaking with patient/family and on coordinating patient care during interdisciplinary rounds

## 2023-01-19 LAB
ALBUMIN SERPL ELPH-MCNC: 3.5 G/DL — SIGNIFICANT CHANGE UP (ref 3.5–5.2)
ALP SERPL-CCNC: 108 U/L — SIGNIFICANT CHANGE UP (ref 30–115)
ALT FLD-CCNC: 16 U/L — SIGNIFICANT CHANGE UP (ref 0–41)
ANION GAP SERPL CALC-SCNC: 8 MMOL/L — SIGNIFICANT CHANGE UP (ref 7–14)
AST SERPL-CCNC: 19 U/L — SIGNIFICANT CHANGE UP (ref 0–41)
BILIRUB SERPL-MCNC: 0.6 MG/DL — SIGNIFICANT CHANGE UP (ref 0.2–1.2)
BUN SERPL-MCNC: 15 MG/DL — SIGNIFICANT CHANGE UP (ref 10–20)
CALCIUM SERPL-MCNC: 8.4 MG/DL — SIGNIFICANT CHANGE UP (ref 8.4–10.5)
CHLORIDE SERPL-SCNC: 100 MMOL/L — SIGNIFICANT CHANGE UP (ref 98–110)
CO2 SERPL-SCNC: 30 MMOL/L — SIGNIFICANT CHANGE UP (ref 17–32)
CREAT SERPL-MCNC: 0.7 MG/DL — SIGNIFICANT CHANGE UP (ref 0.7–1.5)
EGFR: 87 ML/MIN/1.73M2 — SIGNIFICANT CHANGE UP
GLUCOSE SERPL-MCNC: 90 MG/DL — SIGNIFICANT CHANGE UP (ref 70–99)
HCT VFR BLD CALC: 36.9 % — LOW (ref 37–47)
HGB BLD-MCNC: 11.6 G/DL — LOW (ref 12–16)
MCHC RBC-ENTMCNC: 30.6 PG — SIGNIFICANT CHANGE UP (ref 27–31)
MCHC RBC-ENTMCNC: 31.4 G/DL — LOW (ref 32–37)
MCV RBC AUTO: 97.4 FL — SIGNIFICANT CHANGE UP (ref 81–99)
NRBC # BLD: 0 /100 WBCS — SIGNIFICANT CHANGE UP (ref 0–0)
PLATELET # BLD AUTO: 221 K/UL — SIGNIFICANT CHANGE UP (ref 130–400)
POTASSIUM SERPL-MCNC: 4 MMOL/L — SIGNIFICANT CHANGE UP (ref 3.5–5)
POTASSIUM SERPL-SCNC: 4 MMOL/L — SIGNIFICANT CHANGE UP (ref 3.5–5)
PROT SERPL-MCNC: 5.5 G/DL — LOW (ref 6–8)
RBC # BLD: 3.79 M/UL — LOW (ref 4.2–5.4)
RBC # FLD: 14.3 % — SIGNIFICANT CHANGE UP (ref 11.5–14.5)
SODIUM SERPL-SCNC: 138 MMOL/L — SIGNIFICANT CHANGE UP (ref 135–146)
WBC # BLD: 6.56 K/UL — SIGNIFICANT CHANGE UP (ref 4.8–10.8)
WBC # FLD AUTO: 6.56 K/UL — SIGNIFICANT CHANGE UP (ref 4.8–10.8)

## 2023-01-19 PROCEDURE — 99232 SBSQ HOSP IP/OBS MODERATE 35: CPT

## 2023-01-19 PROCEDURE — 99222 1ST HOSP IP/OBS MODERATE 55: CPT

## 2023-01-19 RX ADMIN — Medication 650 MILLIGRAM(S): at 23:04

## 2023-01-19 RX ADMIN — Medication 25 MICROGRAM(S): at 05:22

## 2023-01-19 RX ADMIN — HYDROMORPHONE HYDROCHLORIDE 4 MILLIGRAM(S): 2 INJECTION INTRAMUSCULAR; INTRAVENOUS; SUBCUTANEOUS at 21:19

## 2023-01-19 RX ADMIN — Medication 100 MILLIGRAM(S): at 05:22

## 2023-01-19 RX ADMIN — HYDROMORPHONE HYDROCHLORIDE 4 MILLIGRAM(S): 2 INJECTION INTRAMUSCULAR; INTRAVENOUS; SUBCUTANEOUS at 16:57

## 2023-01-19 RX ADMIN — BUDESONIDE AND FORMOTEROL FUMARATE DIHYDRATE 2 PUFF(S): 160; 4.5 AEROSOL RESPIRATORY (INHALATION) at 07:55

## 2023-01-19 RX ADMIN — AMLODIPINE BESYLATE 2.5 MILLIGRAM(S): 2.5 TABLET ORAL at 05:22

## 2023-01-19 RX ADMIN — GABAPENTIN 300 MILLIGRAM(S): 400 CAPSULE ORAL at 13:01

## 2023-01-19 RX ADMIN — HYDROMORPHONE HYDROCHLORIDE 4 MILLIGRAM(S): 2 INJECTION INTRAMUSCULAR; INTRAVENOUS; SUBCUTANEOUS at 16:01

## 2023-01-19 RX ADMIN — HYDROMORPHONE HYDROCHLORIDE 4 MILLIGRAM(S): 2 INJECTION INTRAMUSCULAR; INTRAVENOUS; SUBCUTANEOUS at 08:59

## 2023-01-19 RX ADMIN — Medication 100 MILLIGRAM(S): at 13:02

## 2023-01-19 RX ADMIN — SENNA PLUS 2 TABLET(S): 8.6 TABLET ORAL at 21:16

## 2023-01-19 RX ADMIN — GABAPENTIN 300 MILLIGRAM(S): 400 CAPSULE ORAL at 21:15

## 2023-01-19 RX ADMIN — POLYETHYLENE GLYCOL 3350 17 GRAM(S): 17 POWDER, FOR SOLUTION ORAL at 11:19

## 2023-01-19 RX ADMIN — TIOTROPIUM BROMIDE 2 PUFF(S): 18 CAPSULE ORAL; RESPIRATORY (INHALATION) at 07:55

## 2023-01-19 RX ADMIN — GABAPENTIN 300 MILLIGRAM(S): 400 CAPSULE ORAL at 05:22

## 2023-01-19 RX ADMIN — ATORVASTATIN CALCIUM 20 MILLIGRAM(S): 80 TABLET, FILM COATED ORAL at 21:16

## 2023-01-19 RX ADMIN — PANTOPRAZOLE SODIUM 40 MILLIGRAM(S): 20 TABLET, DELAYED RELEASE ORAL at 05:22

## 2023-01-19 RX ADMIN — HEPARIN SODIUM 5000 UNIT(S): 5000 INJECTION INTRAVENOUS; SUBCUTANEOUS at 17:03

## 2023-01-19 RX ADMIN — Medication 100 MILLIGRAM(S): at 21:16

## 2023-01-19 RX ADMIN — REMDESIVIR 200 MILLIGRAM(S): 5 INJECTION INTRAVENOUS at 21:15

## 2023-01-19 RX ADMIN — HYDROMORPHONE HYDROCHLORIDE 4 MILLIGRAM(S): 2 INJECTION INTRAMUSCULAR; INTRAVENOUS; SUBCUTANEOUS at 02:24

## 2023-01-19 NOTE — CONSULT NOTE ADULT - SUBJECTIVE AND OBJECTIVE BOX
CARDIOLOGY CONSULT NOTE     CHIEF COMPLAINT/REASON FOR CONSULT:    HPI:  82 year old female with past medical history of DLD, ILD on 2-3L home O2, compression fx thoracic spine, IVC filter, and hypothyroidism presents after recent discharge for shortness of breath and back pain. Patient lives at home alone, and is unable to care for herself. She had a visiting nurse service once since discharge who agreed the patient is unsafe to be living at home. Patient was found to be covid positive upon admission. She states that she has a slight sore throat and had one loose BM today, but does not feel any more short of breath than she she has been since last admission. Patient also states that over the last month, she has been having swelling of both lower legs. She denies chest pain/nausea/vomiting/ abdominal pain. Her back pain has been controlled on 20mg oxycodone.  (16 Jan 2023 16:43)      PAST MEDICAL & SURGICAL HISTORY:  Hypothyroid      Hyperlipemia      Brain aneurysm      Compression fracture of spine      Presence of IVC filter      S/P appendectomy      S/P coil embolization of cerebral aneurysm          Cardiac Risks:   [ ]HTN, [ ] DM, [ ] Smoking, [ ] FH,  [ ] Lipids        MEDICATIONS:  MEDICATIONS  (STANDING):  amLODIPine   Tablet 2.5 milliGRAM(s) Oral daily  atorvastatin 20 milliGRAM(s) Oral at bedtime  benzonatate 100 milliGRAM(s) Oral every 8 hours  budesonide 160 MICROgram(s)/formoterol 4.5 MICROgram(s) Inhaler 2 Puff(s) Inhalation two times a day  gabapentin 300 milliGRAM(s) Oral three times a day  heparin   Injectable 5000 Unit(s) SubCutaneous every 12 hours  levothyroxine 25 MICROGram(s) Oral daily  lidocaine   4% Patch 1 Patch Transdermal daily  pantoprazole    Tablet 40 milliGRAM(s) Oral before breakfast  polyethylene glycol 3350 17 Gram(s) Oral daily  remdesivir  IVPB   IV Intermittent   remdesivir  IVPB 100 milliGRAM(s) IV Intermittent every 24 hours  senna 2 Tablet(s) Oral at bedtime  tiotropium 2.5 MICROgram(s) Inhaler 2 Puff(s) Inhalation daily      FAMILY HISTORY:      SOCIAL HISTORY:      Allergies    Compazine (Unknown)        	    REVIEW OF SYSTEMS:  CONSTITUTIONAL: No fever, weight loss, or fatigue  EYES: No eye pain, visual disturbances, or discharge  ENMT:  No difficulty hearing, tinnitus, vertigo; No sinus or throat pain  NECK: No pain or stiffness  RESPIRATORY: No cough, wheezing, chills or hemoptysis; No Shortness of Breath  CARDIOVASCULAR: No chest pain, palpitations, passing out, dizziness, or leg swelling  GASTROINTESTINAL: No abdominal or epigastric pain. No nausea, vomiting, or hematemesis; No diarrhea or constipation. No melena or hematochezia.  GENITOURINARY: No dysuria, frequency, hematuria, or incontinence  NEUROLOGICAL: No headaches, memory loss, loss of strength, numbness, or tremors  SKIN: No itching, burning, rashes, or lesions   	    [ ] All others negative	  [ ] Unable to obtain    PHYSICAL EXAM:  T(C): 36.9 (01-19-23 @ 06:26), Max: 36.9 (01-19-23 @ 06:26)  HR: 79 (01-19-23 @ 06:26) (78 - 81)  BP: 135/77 (01-19-23 @ 06:26) (111/57 - 135/77)  RR: 16 (01-19-23 @ 06:26) (16 - 18)  SpO2: 97% (01-19-23 @ 06:26) (97% - 97%)  Wt(kg): --  I&O's Summary      Appearance: Normal	  Psychiatry: A & O x 3, Mood & affect appropriate  HEENT:   Normal oral mucosa, PERRL, EOMI	  Lymphatic: No lymphadenopathy  Cardiovascular: Normal S1 S2,RRR, No JVD, No murmurs  Respiratory: Lungs clear to auscultation	  Gastrointestinal:  Soft, Non-tender, + BS	  Skin: No rashes, No ecchymoses, No cyanosis	  Neurologic: Non-focal  Extremities: Normal range of motion, No clubbing, cyanosis or edema  Vascular: Peripheral pulses palpable 2+ bilaterally      ECG:  	< from: 12 Lead ECG (01.16.23 @ 12:14) >  Diagnosis Line Sinus rhythm with Fusion complexes  Left axis deviation  Low voltage QRS  Inferior infarct , age undetermined  Cannot rule out Anterior infarct , age undetermined  Abnormal ECG    Confirmed by Brodie Rosas ([id number]) on 1/17/2023 9:10:28 AM    < end of copied text >        Stress  Adenocard thallium 6/22 no ischemia        	  LABS:	 	    CARDIAC MARKERS:                                    11.6   6.56  )-----------( 221      ( 19 Jan 2023 08:21 )             36.9     01-19    138  |  100  |  15  ----------------------------<  90  4.0   |  30  |  0.7    Ca    8.4      19 Jan 2023 08:21    TPro  5.5<L>  /  Alb  3.5  /  TBili  0.6  /  DBili  x   /  AST  19  /  ALT  16  /  AlkPhos  108  01-19          IMP/PLAN:

## 2023-01-19 NOTE — PROGRESS NOTE ADULT - ASSESSMENT
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 Patient lives at home alone, and is unable to care for herself. She had a visiting nurse service once since discharge who agreed the patient is unsafe to be living at home. Patient was found to be covid positive upon admission. She states that she has a slight sore throat and had one loose BM today, but does not feel any more short of breath than she she has been since last admission. Patient also states that over the last month, she has been having swelling of both lower legs. She denies chest pain/nausea/vomiting/ abdominal pain. Her back pain has been controlled on 20mg oxycodone.     COVID19  Recent atypical infection/viral infection vs possible COPD exacerbation  quit smoking 60 yrs ago smoked 5 yrs 1/2 ppd  previous admission: CT angio PE: no PE, slight patchy GGO diffusely   Left midlung bandlike opacity. Lung volumes. No focal consolidation otherwise. No pneumothorax or pleural effusion.  - isolation precautions  - RDV  - dexa  - monitor labs  - O2 supplement if needed  - incentive spirometry    Severe Thoracic compression fracture T12 complicated by worsening severe back pain, limited ROM, difficulty with ambulation due to pain 2/2 severe compression fx  MRI T-spine: multilevel compression deformities of the thoracic spine  Neurosurgery re-eval appreciated: recommended continued medical management and pain control if patient remains with good strength of bilateral upper and lower extremities -> PT/OT eval  Per neurosurgery,  if not improved, recommend medical optimization/clearance, cardiology clearance and if medically optimized and cleared to transfer to [facility] on 1/23/23 to undergo T5, T9, T12 kyphoplasty on 1/25/23- will get cardio and pulm eval for risk stratification (suspecting patient likely will need surg next week; patient would like kyphoplasty to be done while she is inpatient)   - Per conversation with pain management: patient likely needs neurosurg intervention inpatient; recommended to obtain pre-op risk stratification from pulm and cardio while patient is inhouse  - Pain management eval appreciated: dilaudid PRN, gabapentin, toradol PRN for breakthrough pain  - TLSO brace and PT eval  - CM/social work  - was for STR however apparently patient's insurance lapsed.  She tried to go home with PT program but was not able to care for herself.  Her family is working on insurance and she will need to be admitted here until a safe discharge plan can be put in to place.    Obesity BMI 35  - wt loss per PCP    Hepatic cyst  - f/u OP     Hx/o cerebral aneurysm s/p coiling   - no new deficits.     Hypothyroidism  - cont synthroid    h/o HTN   - c/w amlodipine     h/o DLD  - c/w statin     Diet - DASH   DVT ppx: heparin

## 2023-01-19 NOTE — CONSULT NOTE ADULT - ASSESSMENT
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 Patient may need back surgery. Cardiac w/u neg. She is hypoxic on o2 . Cardiac risk surgery if needed intermediate, Consider pulmonary F/U Dr Cr

## 2023-01-19 NOTE — PROGRESS NOTE ADULT - SUBJECTIVE AND OBJECTIVE BOX
PROGRESS NOTE:   Anival Haile MD  Available on teams    Patient is a 82y old  Female who presents with a chief complaint of Worsening back pain (17 Jan 2023 17:29)      SUBJECTIVE / OVERNIGHT EVENTS:  Seen for follow up for back pain  Reports continue back pain and some shortness of breath  Denies fever, chills, fatigue, chest pain, abdominal pain, nausea/ vomiting or diarrhea    ADDITIONAL REVIEW OF SYSTEMS:    MEDICATIONS  (STANDING):  amLODIPine   Tablet 2.5 milliGRAM(s) Oral daily  atorvastatin 20 milliGRAM(s) Oral at bedtime  benzonatate 100 milliGRAM(s) Oral every 8 hours  budesonide 160 MICROgram(s)/formoterol 4.5 MICROgram(s) Inhaler 2 Puff(s) Inhalation two times a day  gabapentin 300 milliGRAM(s) Oral three times a day  heparin   Injectable 5000 Unit(s) SubCutaneous every 12 hours  levothyroxine 25 MICROGram(s) Oral daily  lidocaine   4% Patch 1 Patch Transdermal daily  pantoprazole    Tablet 40 milliGRAM(s) Oral before breakfast  polyethylene glycol 3350 17 Gram(s) Oral daily  remdesivir  IVPB   IV Intermittent   remdesivir  IVPB 100 milliGRAM(s) IV Intermittent every 24 hours  senna 2 Tablet(s) Oral at bedtime  tiotropium 2.5 MICROgram(s) Inhaler 2 Puff(s) Inhalation daily    MEDICATIONS  (PRN):  acetaminophen     Tablet .. 650 milliGRAM(s) Oral every 6 hours PRN Temp greater or equal to 38C (100.4F), Mild Pain (1 - 3)  albuterol    90 MICROgram(s) HFA Inhaler 2 Puff(s) Inhalation every 6 hours PRN Bronchospasm  HYDROmorphone   Tablet 4 milliGRAM(s) Oral every 4 hours PRN Severe Pain (7 - 10)  hydrOXYzine hydrochloride 25 milliGRAM(s) Oral at bedtime PRN Restlessness  ketorolac   Injectable 15 milliGRAM(s) IV Push every 6 hours PRN breakthrough pain      CAPILLARY BLOOD GLUCOSE        I&O's Summary      PHYSICAL EXAM:  Vital Signs Last 24 Hrs  T(C): 36.9 (19 Jan 2023 06:26), Max: 36.9 (19 Jan 2023 06:26)  T(F): 98.5 (19 Jan 2023 06:26), Max: 98.5 (19 Jan 2023 06:26)  HR: 79 (19 Jan 2023 06:26) (78 - 81)  BP: 135/77 (19 Jan 2023 06:26) (111/57 - 135/77)  BP(mean): --  RR: 16 (19 Jan 2023 06:26) (16 - 18)  SpO2: 97% (19 Jan 2023 06:26) (97% - 97%)    Parameters below as of 19 Jan 2023 06:26  Patient On (Oxygen Delivery Method): nasal cannula  O2 Flow (L/min): 4      PHYSICAL EXAM:  GENERAL: NAD, obese  HEAD:  Atraumatic, Normocephalic  EYES: EOMI, PERRLA, conjunctiva and sclera clear  ENMT: No tonsillar erythema, exudates, or enlargement; Moist mucous membranes  NECK: Supple, No JVD, Normal thyroid  NERVOUS SYSTEM:  Alert & Oriented X3, Good concentration; Motor Strength 5/5 B/L upper and lower extremities  CHEST/LUNG: Clear to percussion bilaterally; No rales, rhonchi, wheezing, or rubs  HEART: Regular rate and rhythm; No murmurs, rubs, or gallops  ABDOMEN: Soft, Nontender, Nondistended; Bowel sounds present  EXTREMITIES:  2+ Peripheral Pulses, No clubbing, cyanosis, or edema  BACK: ROM limited due to pain      LABS:                        11.6   6.56  )-----------( 221      ( 19 Jan 2023 08:21 )             36.9     01-19    138  |  100  |  15  ----------------------------<  90  4.0   |  30  |  0.7    Ca    8.4      19 Jan 2023 08:21    TPro  5.5<L>  /  Alb  3.5  /  TBili  0.6  /  DBili  x   /  AST  19  /  ALT  16  /  AlkPhos  108  01-19                RADIOLOGY & ADDITIONAL TESTS:  Results Reviewed:   Imaging Personally Reviewed:  Electrocardiogram Personally Reviewed:    COORDINATION OF CARE:  Care Discussed with Consultants/Other Providers [Y/N]:  Prior or Outpatient Records Reviewed [Y/N]:

## 2023-01-20 DIAGNOSIS — M48.54XA COLLAPSED VERTEBRA, NOT ELSEWHERE CLASSIFIED, THORACIC REGION, INITIAL ENCOUNTER FOR FRACTURE: ICD-10-CM

## 2023-01-20 DIAGNOSIS — E03.9 HYPOTHYROIDISM, UNSPECIFIED: ICD-10-CM

## 2023-01-20 DIAGNOSIS — B34.9 VIRAL INFECTION, UNSPECIFIED: ICD-10-CM

## 2023-01-20 DIAGNOSIS — Z88.8 ALLERGY STATUS TO OTHER DRUGS, MEDICAMENTS AND BIOLOGICAL SUBSTANCES STATUS: ICD-10-CM

## 2023-01-20 DIAGNOSIS — R06.02 SHORTNESS OF BREATH: ICD-10-CM

## 2023-01-20 DIAGNOSIS — J44.1 CHRONIC OBSTRUCTIVE PULMONARY DISEASE WITH (ACUTE) EXACERBATION: ICD-10-CM

## 2023-01-20 DIAGNOSIS — J96.01 ACUTE RESPIRATORY FAILURE WITH HYPOXIA: ICD-10-CM

## 2023-01-20 DIAGNOSIS — Z87.891 PERSONAL HISTORY OF NICOTINE DEPENDENCE: ICD-10-CM

## 2023-01-20 DIAGNOSIS — E78.5 HYPERLIPIDEMIA, UNSPECIFIED: ICD-10-CM

## 2023-01-20 DIAGNOSIS — E66.9 OBESITY, UNSPECIFIED: ICD-10-CM

## 2023-01-20 DIAGNOSIS — M54.9 DORSALGIA, UNSPECIFIED: ICD-10-CM

## 2023-01-20 DIAGNOSIS — K76.89 OTHER SPECIFIED DISEASES OF LIVER: ICD-10-CM

## 2023-01-20 DIAGNOSIS — Z79.890 HORMONE REPLACEMENT THERAPY: ICD-10-CM

## 2023-01-20 DIAGNOSIS — G89.29 OTHER CHRONIC PAIN: ICD-10-CM

## 2023-01-20 DIAGNOSIS — J84.9 INTERSTITIAL PULMONARY DISEASE, UNSPECIFIED: ICD-10-CM

## 2023-01-20 PROCEDURE — 99232 SBSQ HOSP IP/OBS MODERATE 35: CPT

## 2023-01-20 RX ORDER — FUROSEMIDE 40 MG
20 TABLET ORAL DAILY
Refills: 0 | Status: DISCONTINUED | OUTPATIENT
Start: 2023-01-20 | End: 2023-02-04

## 2023-01-20 RX ADMIN — GABAPENTIN 300 MILLIGRAM(S): 400 CAPSULE ORAL at 21:24

## 2023-01-20 RX ADMIN — HYDROMORPHONE HYDROCHLORIDE 4 MILLIGRAM(S): 2 INJECTION INTRAMUSCULAR; INTRAVENOUS; SUBCUTANEOUS at 13:36

## 2023-01-20 RX ADMIN — HEPARIN SODIUM 5000 UNIT(S): 5000 INJECTION INTRAVENOUS; SUBCUTANEOUS at 06:52

## 2023-01-20 RX ADMIN — Medication 100 MILLIGRAM(S): at 01:05

## 2023-01-20 RX ADMIN — HEPARIN SODIUM 5000 UNIT(S): 5000 INJECTION INTRAVENOUS; SUBCUTANEOUS at 17:22

## 2023-01-20 RX ADMIN — HYDROMORPHONE HYDROCHLORIDE 4 MILLIGRAM(S): 2 INJECTION INTRAMUSCULAR; INTRAVENOUS; SUBCUTANEOUS at 00:59

## 2023-01-20 RX ADMIN — POLYETHYLENE GLYCOL 3350 17 GRAM(S): 17 POWDER, FOR SOLUTION ORAL at 11:25

## 2023-01-20 RX ADMIN — TIOTROPIUM BROMIDE 2 PUFF(S): 18 CAPSULE ORAL; RESPIRATORY (INHALATION) at 07:49

## 2023-01-20 RX ADMIN — REMDESIVIR 200 MILLIGRAM(S): 5 INJECTION INTRAVENOUS at 20:26

## 2023-01-20 RX ADMIN — PANTOPRAZOLE SODIUM 40 MILLIGRAM(S): 20 TABLET, DELAYED RELEASE ORAL at 06:52

## 2023-01-20 RX ADMIN — AMLODIPINE BESYLATE 2.5 MILLIGRAM(S): 2.5 TABLET ORAL at 06:52

## 2023-01-20 RX ADMIN — GABAPENTIN 300 MILLIGRAM(S): 400 CAPSULE ORAL at 13:35

## 2023-01-20 RX ADMIN — Medication 25 MICROGRAM(S): at 06:52

## 2023-01-20 RX ADMIN — HYDROMORPHONE HYDROCHLORIDE 4 MILLIGRAM(S): 2 INJECTION INTRAMUSCULAR; INTRAVENOUS; SUBCUTANEOUS at 09:40

## 2023-01-20 RX ADMIN — GABAPENTIN 300 MILLIGRAM(S): 400 CAPSULE ORAL at 06:52

## 2023-01-20 RX ADMIN — SENNA PLUS 2 TABLET(S): 8.6 TABLET ORAL at 21:25

## 2023-01-20 RX ADMIN — ATORVASTATIN CALCIUM 20 MILLIGRAM(S): 80 TABLET, FILM COATED ORAL at 21:24

## 2023-01-20 RX ADMIN — BUDESONIDE AND FORMOTEROL FUMARATE DIHYDRATE 2 PUFF(S): 160; 4.5 AEROSOL RESPIRATORY (INHALATION) at 21:26

## 2023-01-20 NOTE — CONSULT NOTE ADULT - ASSESSMENT
IMPRESSION:  83 yo female ex smoker with a history of O2 dependent COPD/ILD  She does not appear to be hypercapnic based on most recent VBG  She is on chronic opioid narcotic for back pain which can contribute to hypoventilation and hypoxemia  Admitted for worsening back pain and inability to care for herself at home  Chronic exertional dyspnea with normalized oximetry on 4 lpm NC O2  COVID POSITIVE  Bilateral edema, possible indicate of pulmonary hypertension due to hypoxemic lung disease vs congestive heart failure  Multilevel thoracic vertebrae compression fractures being considered for kyphoplasty  Cardiac workup negative as per Dr Charlton (nuclear stress test June 2022 negative for ischemia)    PLAN:  Continue supplemental O2 to maintain oxygen saturation of 92-96%  Complete course of Remdesivir for COVID  Follow inflammatory markers  Consider ID follow up  Continue Symbicort and Spiriva as ordered  Continue SC heparin for DVT prophylaxis  2D echo, if not already done     Her respiratory status does not appear to be worse in comparison to her prior recent admission, however, the concurrent new COVID diagnosis can potentially exacerbate her chronic respiratory insufficiency and so, there is a possibility that her pulmonary function may improve to some degree as she recovered from COVID.    If surgical intervention is urgently necessary, the patient would appear to be as optimally managed from a respiratory standpoint as possible  She would be at intermediate risk for post operative pulmonary complications which would include respiratory insufficiency, worsening hypoxemia and hypercapnia, atelectasis, prolonged need for ventilatory support as well and VTE, although the patient already has an IVC filter for unclear reasons  Begin incentive spirometry training now and continue postoperatively  Adequate analgesia  Continue DVT prophylaxis  Aspiration precautions  Progressive mobility and ambulation as able    Please call with any questions  [phone]

## 2023-01-20 NOTE — PROGRESS NOTE ADULT - ASSESSMENT
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 Patient lives at home alone, and is unable to care for herself. She had a visiting nurse service once since discharge who agreed the patient is unsafe to be living at home. Patient was found to be covid positive upon admission. She states that she has a slight sore throat and had one loose BM today, but does not feel any more short of breath than she she has been since last admission. Patient also states that over the last month, she has been having swelling of both lower legs. She denies chest pain/nausea/vomiting/ abdominal pain. Her back pain has been controlled on 20mg oxycodone.     COVID19  Recent atypical infection/viral infection vs possible COPD exacerbation  quit smoking 60 yrs ago smoked 5 yrs 1/2 ppd  previous admission: CT angio PE: no PE, slight patchy GGO diffusely   Left midlung bandlike opacity. Lung volumes. No focal consolidation otherwise. No pneumothorax or pleural effusion.  - isolation precautions  - RDV  - dexa  - monitor labs  - O2 supplement if needed  - incentive spirometry    Severe Thoracic compression fracture T12 complicated by worsening severe back pain, limited ROM, difficulty with ambulation due to pain 2/2 severe compression fx  MRI T-spine: multilevel compression deformities of the thoracic spine  Neurosurgery re-eval appreciated: recommended continued medical management and pain control if patient remains with good strength of bilateral upper and lower extremities -> PT/OT eval  Per neurosurgery,  if not improved, recommend medical optimization/clearance, cardiology clearance and if medically optimized and cleared to transfer to [facility] on 1/23/23 to undergo T5, T9, T12 kyphoplasty on 1/25/23- will get cardio and pulm eval for risk stratification (suspecting patient likely will need surg next week; patient would like kyphoplasty to be done while she is inpatient)   - Per conversation with pain management: patient likely needs neurosurg intervention inpatient; recommended to obtain pre-op risk stratification from pulm and cardio while patient is inhouse  - Pain management eval appreciated: dilaudid PRN, gabapentin, toradol PRN for breakthrough pain  - TLSO brace and PT eval  - CM/social work  - was for STR however apparently patient's insurance lapsed.  She tried to go home with PT program but was not able to care for herself.  Her family is working on insurance and she will need to be admitted here until a safe discharge plan can be put in to place.    Obesity BMI 35  - wt loss per PCP    Hepatic cyst  - f/u OP     Hx/o cerebral aneurysm s/p coiling   - no new deficits.     Hypothyroidism  - cont synthroid    h/o HTN   - c/w amlodipine    B/l LE edema  -evaled by cardio  -start Lasix 20mg PO     h/o DLD  - c/w statin     Diet - DASH   DVT ppx: heparin

## 2023-01-20 NOTE — CONSULT NOTE ADULT - SUBJECTIVE AND OBJECTIVE BOX
Patient is a 83 yo female who presents with a chief complaint of worsening back pain (17 Jan 2023 17:29)    HPI:  82 year old female, followed by Dr Enriqueta Landis, who presented to the ED after recently being discharged with a complaint of shortness of breath and back pain.   Patient lives at home alone, and is unable to care for herself.   She had a visiting nurse service once since discharge who agreed the patient is unsafe to be living at home.  She was found to be COVID POSITIVE upon admission.  She states that she has a slight sore throat but does not feel any more short of breath than she has been since last admission.   She also states that over the last month, she has been having swelling of both lower legs.   She denies chest pain/nausea/vomiting/ abdominal pain.  She has a pertinent medical history of DLD, COPD/ILD on 2-3L home O2, compression fx thoracic spine, s/p IVC filter, and hypothyroidism     Her back pain has been controlled on 20mg oxycodone.    (16 Jan 2023 16:43)    Seen today by me  She complains of back pain and bilateral leg pain  On NC O2  No new or worsening dyspnea    PAST MEDICAL & SURGICAL HISTORY:  COPD/ILD on home O2  Hypothyroid  Hyperlipemia  Brain aneurysm-S/P coil embolization of cerebral aneurysm  Compression fracture of spine  Presence of IVC filter  S/P appendectomy    Smoking History:  Former smoker    Review of Systems:  Back pain  Leg pains and edema  Chronic exertional dyspnea  Sore throat  No fever chills GI or  complaints    Allergies:  Compazine (Unknown)    MEDICATIONS  (STANDING):  amLODIPine   Tablet 2.5 milliGRAM(s) Oral daily  atorvastatin 20 milliGRAM(s) Oral at bedtime  benzonatate 100 milliGRAM(s) Oral every 8 hours  SYMBICORT budesonide 160 MICROgram(s)/formoterol 4.5 MICROgram(s) Inhaler 2 Puff(s) Inhalation two times a day  gabapentin 300 milliGRAM(s) Oral three times a day  heparin   Injectable 5000 Unit(s) SubCutaneous every 12 hours  levothyroxine 25 MICROGram(s) Oral daily  lidocaine   4% Patch 1 Patch Transdermal daily  pantoprazole    Tablet 40 milliGRAM(s) Oral before breakfast  polyethylene glycol 3350 17 Gram(s) Oral daily  remdesivir  IVPB 100 milliGRAM(s) IV Intermittent every 24 hours  senna 2 Tablet(s) Oral at bedtime  tiotropium 2.5 MICROgram(s) Inhaler 2 Puff(s) Inhalation daily    MEDICATIONS  (PRN):  acetaminophen     Tablet .. 650 milliGRAM(s) Oral every 6 hours PRN Temp greater or equal to 38C (100.4F), Mild Pain (1 - 3)  albuterol    90 MICROgram(s) HFA Inhaler 2 Puff(s) Inhalation every 6 hours PRN Bronchospasm  HYDROmorphone   Tablet 4 milliGRAM(s) Oral every 4 hours PRN Severe Pain (7 - 10)  hydrOXYzine hydrochloride 25 milliGRAM(s) Oral at bedtime PRN Restlessness  ketorolac   Injectable 15 milliGRAM(s) IV Push every 6 hours PRN breakthrough pain    PHYSICAL EXAM  Vital Signs Last 24 Hrs  T(C): 35.9 (20 Jan 2023 05:22), Max: 35.9 (20 Jan 2023 05:22)  T(F): 96.7 (20 Jan 2023 05:22), Max: 96.7 (20 Jan 2023 05:22)  HR: 86 (20 Jan 2023 05:22) (83 - 96)  BP: 133/61 (20 Jan 2023 05:22) (108/70 - 152/70)  RR: 18 (20 Jan 2023 05:22) (18 - 18)  SpO2: 97% (19 Jan 2023 23:30) (97% - 100%) on 4 lpm NC    Parameters below as of 19 Jan 2023 23:30  Patient On (Oxygen Delivery Method): nasal cannula  O2 Flow (L/min): 4    Awake and alert NAD  Neck supple, no LN  S1 and S2 no murmur  Lungs bibasilar crackles some rhonchi  Abdomen is soft and non tender  There is bilateral LE edema  Neurologically non focal      LABS:                        11.6   6.56  )-----------( 221      ( 19 Jan 2023 08:21 )             36.9                                               01-19    138  |  100  |  15  ----------------------------<  90  4.0   |  30  |  0.7    Ca    8.4      19 Jan 2023 08:21    TPro  5.5<L>  /  Alb  3.5  /  TBili  0.6  /  DBili  x   /  AST  19  /  ALT  16  /  AlkPhos  108  01-19    Blood Gas Profile - Venous (01.16.23 @ 12:17)  7.42 / 46 / 25                                    RADIOGRAPHS:  < from: Xray Chest 1 View- PORTABLE-Urgent (01.16.23 @ 12:48) >  Left midlung bandlike opacity.   Lung volumes. No focal consolidation otherwise.   No pneumothorax or pleural effusion.  Stable cardiomediastinal silhouette.  Unchanged osseous structures.    < from: [facility] Duplex Lower Ext Vein Scan, Bilat (01.16.23 @ 15:16) >  No evidence of deep venous thrombosis or superficial thrombophlebitis in   the bilateral lower extremities. Limited evaluation of the calf veins.    Older Studies  < from: CT Angio Chest PE Protocol w/ IV Cont (12.26.22 @ 17:56) >  No evidence of a pulmonary embolism.  Diffuse bilateral interstitial opacities noted.   No focal consolidation pleural effusion or pneumothorax.   No bronchiectasis or honeycombing.   Findings may be on the basis of atypical/viral infection versus chronic interstitial lung disease.   Clinical correlation is advised.   Recommend a three-month follow-up chest CT, high resolution protocol.  7.0 x 7.0 cm right hepatic dome simple appearing cystic lesion.   Recommend a 12 month follow-up right upper quadrant ultrasound to assess for stability.    < from: MR Thoracic Spine No Cont (12.30.22 @ 17:34) >  Multilevel compression deformities of the thoracic spine:  T4, T5, T9, T11, T12  -Severe compression deformity of T12 with mild retropulsion of the   posterior cortex, likely acute on chronic. This results in mild   flattening of the ventral cord.  Multilevel thoracic spondylosis  Mild-moderate spinal stenosis

## 2023-01-20 NOTE — PROGRESS NOTE ADULT - SUBJECTIVE AND OBJECTIVE BOX
PROGRESS NOTE:   Anival Haile MD  Available on teams    Patient is a 82y old  Female who presents with a chief complaint of Worsening back pain (17 Jan 2023 17:29)      SUBJECTIVE / OVERNIGHT EVENTS:  Seen for follow up for back pain  Reports no new complaints  Reports lower extremity swelling that started a few weeks ago  Denies fever, chills, fatigue, chest pain, abdominal pain, nausea/ vomiting or diarrhea    ADDITIONAL REVIEW OF SYSTEMS:    MEDICATIONS  (STANDING):  amLODIPine   Tablet 2.5 milliGRAM(s) Oral daily  atorvastatin 20 milliGRAM(s) Oral at bedtime  benzonatate 100 milliGRAM(s) Oral every 8 hours  budesonide 160 MICROgram(s)/formoterol 4.5 MICROgram(s) Inhaler 2 Puff(s) Inhalation two times a day  gabapentin 300 milliGRAM(s) Oral three times a day  heparin   Injectable 5000 Unit(s) SubCutaneous every 12 hours  levothyroxine 25 MICROGram(s) Oral daily  lidocaine   4% Patch 1 Patch Transdermal daily  pantoprazole    Tablet 40 milliGRAM(s) Oral before breakfast  polyethylene glycol 3350 17 Gram(s) Oral daily  remdesivir  IVPB   IV Intermittent   remdesivir  IVPB 100 milliGRAM(s) IV Intermittent every 24 hours  senna 2 Tablet(s) Oral at bedtime  tiotropium 2.5 MICROgram(s) Inhaler 2 Puff(s) Inhalation daily    MEDICATIONS  (PRN):  acetaminophen     Tablet .. 650 milliGRAM(s) Oral every 6 hours PRN Temp greater or equal to 38C (100.4F), Mild Pain (1 - 3)  albuterol    90 MICROgram(s) HFA Inhaler 2 Puff(s) Inhalation every 6 hours PRN Bronchospasm  HYDROmorphone   Tablet 4 milliGRAM(s) Oral every 4 hours PRN Severe Pain (7 - 10)  hydrOXYzine hydrochloride 25 milliGRAM(s) Oral at bedtime PRN Restlessness  ketorolac   Injectable 15 milliGRAM(s) IV Push every 6 hours PRN breakthrough pain      CAPILLARY BLOOD GLUCOSE        I&O's Summary      PHYSICAL EXAM:  Vital Signs Last 24 Hrs  T(C): 36.9 (19 Jan 2023 06:26), Max: 36.9 (19 Jan 2023 06:26)  T(F): 98.5 (19 Jan 2023 06:26), Max: 98.5 (19 Jan 2023 06:26)  HR: 79 (19 Jan 2023 06:26) (78 - 81)  BP: 135/77 (19 Jan 2023 06:26) (111/57 - 135/77)  BP(mean): --  RR: 16 (19 Jan 2023 06:26) (16 - 18)  SpO2: 97% (19 Jan 2023 06:26) (97% - 97%)    Parameters below as of 19 Jan 2023 06:26  Patient On (Oxygen Delivery Method): nasal cannula  O2 Flow (L/min): 4      PHYSICAL EXAM:  GENERAL: NAD, obese  HEAD:  Atraumatic, Normocephalic  EYES: EOMI, PERRLA, conjunctiva and sclera clear  ENMT: No tonsillar erythema, exudates, or enlargement; Moist mucous membranes  NECK: Supple, No JVD, Normal thyroid  NERVOUS SYSTEM:  Alert & Oriented X3, Good concentration; Motor Strength 5/5 B/L upper and lower extremities  CHEST/LUNG: Clear to percussion bilaterally; No rales, rhonchi, wheezing, or rubs  HEART: Regular rate and rhythm; No murmurs, rubs, or gallops  ABDOMEN: Soft, Nontender, Nondistended; Bowel sounds present  EXTREMITIES:  2-3+ edema  BACK: ROM limited due to pain      LABS:                        11.6   6.56  )-----------( 221      ( 19 Jan 2023 08:21 )             36.9     01-19    138  |  100  |  15  ----------------------------<  90  4.0   |  30  |  0.7    Ca    8.4      19 Jan 2023 08:21    TPro  5.5<L>  /  Alb  3.5  /  TBili  0.6  /  DBili  x   /  AST  19  /  ALT  16  /  AlkPhos  108  01-19                RADIOLOGY & ADDITIONAL TESTS:  Results Reviewed:   Imaging Personally Reviewed:  Electrocardiogram Personally Reviewed:    COORDINATION OF CARE:  Care Discussed with Consultants/Other Providers [Y/N]:  Prior or Outpatient Records Reviewed [Y/N]:

## 2023-01-21 LAB — SARS-COV-2 RNA SPEC QL NAA+PROBE: DETECTED

## 2023-01-21 PROCEDURE — 99232 SBSQ HOSP IP/OBS MODERATE 35: CPT

## 2023-01-21 RX ADMIN — POLYETHYLENE GLYCOL 3350 17 GRAM(S): 17 POWDER, FOR SOLUTION ORAL at 12:47

## 2023-01-21 RX ADMIN — PANTOPRAZOLE SODIUM 40 MILLIGRAM(S): 20 TABLET, DELAYED RELEASE ORAL at 06:03

## 2023-01-21 RX ADMIN — Medication 20 MILLIGRAM(S): at 06:03

## 2023-01-21 RX ADMIN — AMLODIPINE BESYLATE 2.5 MILLIGRAM(S): 2.5 TABLET ORAL at 06:03

## 2023-01-21 RX ADMIN — HYDROMORPHONE HYDROCHLORIDE 4 MILLIGRAM(S): 2 INJECTION INTRAMUSCULAR; INTRAVENOUS; SUBCUTANEOUS at 06:16

## 2023-01-21 RX ADMIN — TIOTROPIUM BROMIDE 2 PUFF(S): 18 CAPSULE ORAL; RESPIRATORY (INHALATION) at 07:55

## 2023-01-21 RX ADMIN — HEPARIN SODIUM 5000 UNIT(S): 5000 INJECTION INTRAVENOUS; SUBCUTANEOUS at 17:47

## 2023-01-21 RX ADMIN — ATORVASTATIN CALCIUM 20 MILLIGRAM(S): 80 TABLET, FILM COATED ORAL at 21:23

## 2023-01-21 RX ADMIN — HYDROMORPHONE HYDROCHLORIDE 4 MILLIGRAM(S): 2 INJECTION INTRAMUSCULAR; INTRAVENOUS; SUBCUTANEOUS at 17:47

## 2023-01-21 RX ADMIN — Medication 25 MICROGRAM(S): at 06:03

## 2023-01-21 RX ADMIN — GABAPENTIN 300 MILLIGRAM(S): 400 CAPSULE ORAL at 13:33

## 2023-01-21 RX ADMIN — HYDROMORPHONE HYDROCHLORIDE 4 MILLIGRAM(S): 2 INJECTION INTRAMUSCULAR; INTRAVENOUS; SUBCUTANEOUS at 22:35

## 2023-01-21 RX ADMIN — GABAPENTIN 300 MILLIGRAM(S): 400 CAPSULE ORAL at 06:03

## 2023-01-21 RX ADMIN — BUDESONIDE AND FORMOTEROL FUMARATE DIHYDRATE 2 PUFF(S): 160; 4.5 AEROSOL RESPIRATORY (INHALATION) at 07:56

## 2023-01-21 RX ADMIN — HYDROMORPHONE HYDROCHLORIDE 4 MILLIGRAM(S): 2 INJECTION INTRAMUSCULAR; INTRAVENOUS; SUBCUTANEOUS at 22:03

## 2023-01-21 RX ADMIN — BUDESONIDE AND FORMOTEROL FUMARATE DIHYDRATE 2 PUFF(S): 160; 4.5 AEROSOL RESPIRATORY (INHALATION) at 20:22

## 2023-01-21 RX ADMIN — HEPARIN SODIUM 5000 UNIT(S): 5000 INJECTION INTRAVENOUS; SUBCUTANEOUS at 06:03

## 2023-01-21 RX ADMIN — GABAPENTIN 300 MILLIGRAM(S): 400 CAPSULE ORAL at 21:23

## 2023-01-21 RX ADMIN — SENNA PLUS 2 TABLET(S): 8.6 TABLET ORAL at 21:23

## 2023-01-21 RX ADMIN — HYDROMORPHONE HYDROCHLORIDE 4 MILLIGRAM(S): 2 INJECTION INTRAMUSCULAR; INTRAVENOUS; SUBCUTANEOUS at 13:38

## 2023-01-21 NOTE — PROGRESS NOTE ADULT - ASSESSMENT
chronic hypoxic respiratory failure  covid-19 infection  copd/ild  vertebral compression fractures      low flow oxygen, monitor SpO2  albuterol prn  continue ICS/LABA/LAMA  continue course of remdesivir  analgesia  incentive spirometry  gi/dvt prophylaxis

## 2023-01-21 NOTE — PROGRESS NOTE ADULT - SUBJECTIVE AND OBJECTIVE BOX
PROGRESS NOTE:   Anival Haile MD  Available on teams    Patient is a 82y old  Female who presents with a chief complaint of Worsening back pain (17 Jan 2023 17:29)      SUBJECTIVE / OVERNIGHT EVENTS:  Seen for follow up for back pain  Reports no new complaints  Reports that pain is tolerable between when laying in bed  Denies fever, chills, fatigue, chest pain, abdominal pain, nausea/ vomiting or diarrhea    ADDITIONAL REVIEW OF SYSTEMS:    MEDICATIONS  (STANDING):  amLODIPine   Tablet 2.5 milliGRAM(s) Oral daily  atorvastatin 20 milliGRAM(s) Oral at bedtime  benzonatate 100 milliGRAM(s) Oral every 8 hours  budesonide 160 MICROgram(s)/formoterol 4.5 MICROgram(s) Inhaler 2 Puff(s) Inhalation two times a day  furosemide    Tablet 20 milliGRAM(s) Oral daily  gabapentin 300 milliGRAM(s) Oral three times a day  heparin   Injectable 5000 Unit(s) SubCutaneous every 12 hours  levothyroxine 25 MICROGram(s) Oral daily  lidocaine   4% Patch 1 Patch Transdermal daily  pantoprazole    Tablet 40 milliGRAM(s) Oral before breakfast  polyethylene glycol 3350 17 Gram(s) Oral daily  remdesivir  IVPB   IV Intermittent   remdesivir  IVPB 100 milliGRAM(s) IV Intermittent every 24 hours  senna 2 Tablet(s) Oral at bedtime  tiotropium 2.5 MICROgram(s) Inhaler 2 Puff(s) Inhalation daily    MEDICATIONS  (PRN):  acetaminophen     Tablet .. 650 milliGRAM(s) Oral every 6 hours PRN Temp greater or equal to 38C (100.4F), Mild Pain (1 - 3)  albuterol    90 MICROgram(s) HFA Inhaler 2 Puff(s) Inhalation every 6 hours PRN Bronchospasm  HYDROmorphone   Tablet 4 milliGRAM(s) Oral every 4 hours PRN Severe Pain (7 - 10)  hydrOXYzine hydrochloride 25 milliGRAM(s) Oral at bedtime PRN Restlessness  ketorolac   Injectable 15 milliGRAM(s) IV Push every 6 hours PRN breakthrough pain        CAPILLARY BLOOD GLUCOSE        I&O's Summary      PHYSICAL EXAM:  Vital Signs Last 24 Hrs  T(C): 36.9 (19 Jan 2023 06:26), Max: 36.9 (19 Jan 2023 06:26)  T(F): 98.5 (19 Jan 2023 06:26), Max: 98.5 (19 Jan 2023 06:26)  HR: 79 (19 Jan 2023 06:26) (78 - 81)  BP: 135/77 (19 Jan 2023 06:26) (111/57 - 135/77)  BP(mean): --  RR: 16 (19 Jan 2023 06:26) (16 - 18)  SpO2: 97% (19 Jan 2023 06:26) (97% - 97%)    Parameters below as of 19 Jan 2023 06:26  Patient On (Oxygen Delivery Method): nasal cannula  O2 Flow (L/min): 4      PHYSICAL EXAM:  GENERAL: NAD, obese  HEAD:  Atraumatic, Normocephalic  EYES: EOMI, PERRLA, conjunctiva and sclera clear  ENMT: No tonsillar erythema, exudates, or enlargement; Moist mucous membranes  NECK: Supple, No JVD, Normal thyroid  NERVOUS SYSTEM:  Alert & Oriented X3, Good concentration; Motor Strength 5/5 B/L upper and lower extremities  CHEST/LUNG: Clear to percussion bilaterally; No rales, rhonchi, wheezing, or rubs  HEART: Regular rate and rhythm; No murmurs, rubs, or gallops  ABDOMEN: Soft, Nontender, Nondistended; Bowel sounds present  EXTREMITIES:  2-3+ edema  BACK: ROM limited due to pain      LABS:                        11.6   6.56  )-----------( 221      ( 19 Jan 2023 08:21 )             36.9     01-19    138  |  100  |  15  ----------------------------<  90  4.0   |  30  |  0.7    Ca    8.4      19 Jan 2023 08:21    TPro  5.5<L>  /  Alb  3.5  /  TBili  0.6  /  DBili  x   /  AST  19  /  ALT  16  /  AlkPhos  108  01-19                RADIOLOGY & ADDITIONAL TESTS:  Results Reviewed:   Imaging Personally Reviewed:  Electrocardiogram Personally Reviewed:    COORDINATION OF CARE:  Care Discussed with Consultants/Other Providers [Y/N]:  Prior or Outpatient Records Reviewed [Y/N]:

## 2023-01-21 NOTE — PROGRESS NOTE ADULT - SUBJECTIVE AND OBJECTIVE BOX
WILFREDO CHO  82y, Female  Allergy: Compazine (Unknown)      LAST 24-Hr EVENTS:  complains of back pain; breathing not bad at rest; denies significant cough  VITALS:  T(F): 98.5 (01-21-23 @ 04:56), Max: 98.5 (01-21-23 @ 04:56)  HR: 88 (01-21-23 @ 04:56)  BP: 115/72 (01-21-23 @ 04:56) (111/62 - 119/56)  RR: 18 (01-21-23 @ 04:56)  SpO2: 100% (01-20-23 @ 20:45)low flow oxygen    PHYSICAL EXAM:    GENERAL: NAD, well-developed  HEAD:  Atraumatic, Normocephalic  NECK: Supple, No JVD  CHEST/LUNG: Clear to auscultation bilaterally; No wheeze  HEART: Regular rate and rhythm; No murmurs, rubs, or gallops  ABDOMEN: Soft, Nontender, Nondistended; Bowel sounds present  EXTREMITIES:  2+ Peripheral Pulses, No clubbing, cyanosis, or edema        TESTS & MEASUREMENTS:    IN: 0 mL / OUT: 1150 mL / NET: -1150 mL    IN: 0 mL / OUT: 500 mL / NET: -500 mL                            ABG & VENT SETTINGS: (when applicable)    n/a    RADIOLOGY & ADDITIONAL TESTS:    MEDICATIONS:  MEDICATIONS  (STANDING):  amLODIPine   Tablet 2.5 milliGRAM(s) Oral daily  atorvastatin 20 milliGRAM(s) Oral at bedtime  benzonatate 100 milliGRAM(s) Oral every 8 hours  budesonide 160 MICROgram(s)/formoterol 4.5 MICROgram(s) Inhaler 2 Puff(s) Inhalation two times a day  furosemide    Tablet 20 milliGRAM(s) Oral daily  gabapentin 300 milliGRAM(s) Oral three times a day  heparin   Injectable 5000 Unit(s) SubCutaneous every 12 hours  levothyroxine 25 MICROGram(s) Oral daily  lidocaine   4% Patch 1 Patch Transdermal daily  pantoprazole    Tablet 40 milliGRAM(s) Oral before breakfast  polyethylene glycol 3350 17 Gram(s) Oral daily  remdesivir  IVPB   IV Intermittent   remdesivir  IVPB 100 milliGRAM(s) IV Intermittent every 24 hours  senna 2 Tablet(s) Oral at bedtime  tiotropium 2.5 MICROgram(s) Inhaler 2 Puff(s) Inhalation daily    MEDICATIONS  (PRN):  acetaminophen     Tablet .. 650 milliGRAM(s) Oral every 6 hours PRN Temp greater or equal to 38C (100.4F), Mild Pain (1 - 3)  albuterol    90 MICROgram(s) HFA Inhaler 2 Puff(s) Inhalation every 6 hours PRN Bronchospasm  HYDROmorphone   Tablet 4 milliGRAM(s) Oral every 4 hours PRN Severe Pain (7 - 10)  hydrOXYzine hydrochloride 25 milliGRAM(s) Oral at bedtime PRN Restlessness  ketorolac   Injectable 15 milliGRAM(s) IV Push every 6 hours PRN breakthrough pain

## 2023-01-21 NOTE — PROGRESS NOTE ADULT - ASSESSMENT
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 Patient lives at home alone, and is unable to care for herself. She had a visiting nurse service once since discharge who agreed the patient is unsafe to be living at home. Patient was found to be covid positive upon admission. She states that she has a slight sore throat and had one loose BM today, but does not feel any more short of breath than she she has been since last admission. Patient also states that over the last month, she has been having swelling of both lower legs. She denies chest pain/nausea/vomiting/ abdominal pain. Her back pain has been controlled on 20mg oxycodone.     COVID19  Recent atypical infection/viral infection vs possible COPD exacerbation  quit smoking 60 yrs ago smoked 5 yrs 1/2 ppd  previous admission: CT angio PE: no PE, slight patchy GGO diffusely   Left midlung bandlike opacity. Lung volumes. No focal consolidation otherwise. No pneumothorax or pleural effusion.  - isolation precautions  - Complete RDV and dexa course  - monitor labs  - O2 supplement if needed  - incentive spirometry    Severe Thoracic compression fracture T12 complicated by worsening severe back pain, limited ROM, difficulty with ambulation due to pain 2/2 severe compression fx  MRI T-spine: multilevel compression deformities of the thoracic spine  Neurosurgery re-eval appreciated: recommended continued medical management and pain control if patient remains with good strength of bilateral upper and lower extremities -> PT/OT eval  Per neurosurgery,  if not improved, recommend medical optimization/clearance, cardiology clearance and if medically optimized and cleared to [facility] to undergo T5, T9, T12 kyphoplasty on 1/25/23- will get cardio and pulm eval for risk stratification (suspecting patient likely will need surg next week; patient would like kyphoplasty to be done while she is inpatient)   - Per conversation with pain management: patient likely needs neurosurg intervention inpatient; recommended to obtain pre-op risk stratification from pulm and cardio while patient is inhouse  - Pain management eval appreciated: dilaudid PRN, gabapentin, toradol PRN for breakthrough pain  - TLSO brace and PT eval  - CM/social work  - was for STR however apparently patient's insurance lapsed.  She tried to go home with PT program but was not able to care for herself.  Her family is working on insurance and she will need to be admitted here until a safe discharge plan can be put in to place.    Obesity BMI 35  - wt loss per PCP    Hepatic cyst  - f/u OP     Hx/o cerebral aneurysm s/p coiling   - no new deficits.     Hypothyroidism  - cont synthroid    h/o HTN   - c/w amlodipine    B/l LE edema  -evaled by cardio  -c/w Lasix 20mg PO     h/o DLD  - c/w statin     Diet - DASH   DVT ppx: heparin

## 2023-01-22 PROCEDURE — 99232 SBSQ HOSP IP/OBS MODERATE 35: CPT

## 2023-01-22 RX ADMIN — BUDESONIDE AND FORMOTEROL FUMARATE DIHYDRATE 2 PUFF(S): 160; 4.5 AEROSOL RESPIRATORY (INHALATION) at 20:30

## 2023-01-22 RX ADMIN — HYDROMORPHONE HYDROCHLORIDE 4 MILLIGRAM(S): 2 INJECTION INTRAMUSCULAR; INTRAVENOUS; SUBCUTANEOUS at 21:10

## 2023-01-22 RX ADMIN — TIOTROPIUM BROMIDE 2 PUFF(S): 18 CAPSULE ORAL; RESPIRATORY (INHALATION) at 07:56

## 2023-01-22 RX ADMIN — HYDROMORPHONE HYDROCHLORIDE 4 MILLIGRAM(S): 2 INJECTION INTRAMUSCULAR; INTRAVENOUS; SUBCUTANEOUS at 02:41

## 2023-01-22 RX ADMIN — HYDROMORPHONE HYDROCHLORIDE 4 MILLIGRAM(S): 2 INJECTION INTRAMUSCULAR; INTRAVENOUS; SUBCUTANEOUS at 11:27

## 2023-01-22 RX ADMIN — POLYETHYLENE GLYCOL 3350 17 GRAM(S): 17 POWDER, FOR SOLUTION ORAL at 11:22

## 2023-01-22 RX ADMIN — HEPARIN SODIUM 5000 UNIT(S): 5000 INJECTION INTRAVENOUS; SUBCUTANEOUS at 05:29

## 2023-01-22 RX ADMIN — BUDESONIDE AND FORMOTEROL FUMARATE DIHYDRATE 2 PUFF(S): 160; 4.5 AEROSOL RESPIRATORY (INHALATION) at 07:56

## 2023-01-22 RX ADMIN — GABAPENTIN 300 MILLIGRAM(S): 400 CAPSULE ORAL at 21:11

## 2023-01-22 RX ADMIN — Medication 20 MILLIGRAM(S): at 05:28

## 2023-01-22 RX ADMIN — HYDROMORPHONE HYDROCHLORIDE 4 MILLIGRAM(S): 2 INJECTION INTRAMUSCULAR; INTRAVENOUS; SUBCUTANEOUS at 11:29

## 2023-01-22 RX ADMIN — HYDROMORPHONE HYDROCHLORIDE 4 MILLIGRAM(S): 2 INJECTION INTRAMUSCULAR; INTRAVENOUS; SUBCUTANEOUS at 06:45

## 2023-01-22 RX ADMIN — GABAPENTIN 300 MILLIGRAM(S): 400 CAPSULE ORAL at 05:28

## 2023-01-22 RX ADMIN — SENNA PLUS 2 TABLET(S): 8.6 TABLET ORAL at 21:11

## 2023-01-22 RX ADMIN — LIDOCAINE 1 PATCH: 4 CREAM TOPICAL at 11:20

## 2023-01-22 RX ADMIN — LIDOCAINE 1 PATCH: 4 CREAM TOPICAL at 17:14

## 2023-01-22 RX ADMIN — LIDOCAINE 1 PATCH: 4 CREAM TOPICAL at 23:30

## 2023-01-22 RX ADMIN — ATORVASTATIN CALCIUM 20 MILLIGRAM(S): 80 TABLET, FILM COATED ORAL at 21:10

## 2023-01-22 RX ADMIN — GABAPENTIN 300 MILLIGRAM(S): 400 CAPSULE ORAL at 13:15

## 2023-01-22 RX ADMIN — PANTOPRAZOLE SODIUM 40 MILLIGRAM(S): 20 TABLET, DELAYED RELEASE ORAL at 06:04

## 2023-01-22 RX ADMIN — HEPARIN SODIUM 5000 UNIT(S): 5000 INJECTION INTRAVENOUS; SUBCUTANEOUS at 17:13

## 2023-01-22 RX ADMIN — Medication 25 MICROGRAM(S): at 05:29

## 2023-01-22 NOTE — PROGRESS NOTE ADULT - ASSESSMENT
chronic hypoxic respiratory failure  covid-19 infection  copd/ILD  acute cor pulmonale  thoracic vertebral compression fractures/back pain    continue oxygen per pulse oximetry  bronchodilators  symbicort/spiriva  finish course of remdesivir  increase lasix as appropriate  patient remains in acceptable pulmonary condition for GETA/kyphoplasty at intermediate risk  continue incentive spirometry/mobilize as tolerated  analgesia  prognosis guarded

## 2023-01-22 NOTE — PROGRESS NOTE ADULT - SUBJECTIVE AND OBJECTIVE BOX
WILFREDO CHO  82y, Female  Allergy: Compazine (Unknown)      LAST 24-Hr EVENTS:  complains of leg swelling  VITALS:  T(F): 96.5 (01-22-23 @ 05:22), Max: 97.2 (01-21-23 @ 21:03)  HR: 94 (01-22-23 @ 05:22)  BP: 106/62 (01-22-23 @ 05:22) (106/62 - 118/74)  RR: 16 (01-22-23 @ 05:22)  SpO2: 100% (01-22-23 @ 05:22)low flow oxygen    PHYSICAL EXAM:    GENERAL: NAD, well-developed  HEAD:  Atraumatic, Normocephalic  NECK: Supple, No JVD  CHEST/LUNG: Clear to auscultation bilaterally; No wheeze  HEART: Regular rate and rhythm; No murmurs, rubs, or gallops  ABDOMEN: Soft, Nontender, Nondistended; Bowel sounds present  EXTREMITIES:  2+ Peripheral Pulses, No clubbing, cyanosis; 2 + dependent edema        TESTS & MEASUREMENTS:    IN: 0 mL / OUT: 500 mL / NET: -500 mL                            ABG & VENT SETTINGS: (when applicable)    n/a    RADIOLOGY & ADDITIONAL TESTS:    MEDICATIONS:  MEDICATIONS  (STANDING):  amLODIPine   Tablet 2.5 milliGRAM(s) Oral daily  atorvastatin 20 milliGRAM(s) Oral at bedtime  benzonatate 100 milliGRAM(s) Oral every 8 hours  budesonide 160 MICROgram(s)/formoterol 4.5 MICROgram(s) Inhaler 2 Puff(s) Inhalation two times a day  furosemide    Tablet 20 milliGRAM(s) Oral daily  gabapentin 300 milliGRAM(s) Oral three times a day  heparin   Injectable 5000 Unit(s) SubCutaneous every 12 hours  levothyroxine 25 MICROGram(s) Oral daily  lidocaine   4% Patch 1 Patch Transdermal daily  pantoprazole    Tablet 40 milliGRAM(s) Oral before breakfast  polyethylene glycol 3350 17 Gram(s) Oral daily  remdesivir  IVPB   IV Intermittent   remdesivir  IVPB 100 milliGRAM(s) IV Intermittent every 24 hours  senna 2 Tablet(s) Oral at bedtime  tiotropium 2.5 MICROgram(s) Inhaler 2 Puff(s) Inhalation daily    MEDICATIONS  (PRN):  acetaminophen     Tablet .. 650 milliGRAM(s) Oral every 6 hours PRN Temp greater or equal to 38C (100.4F), Mild Pain (1 - 3)  albuterol    90 MICROgram(s) HFA Inhaler 2 Puff(s) Inhalation every 6 hours PRN Bronchospasm  HYDROmorphone   Tablet 4 milliGRAM(s) Oral every 4 hours PRN Severe Pain (7 - 10)  hydrOXYzine hydrochloride 25 milliGRAM(s) Oral at bedtime PRN Restlessness  ketorolac   Injectable 15 milliGRAM(s) IV Push every 6 hours PRN breakthrough pain

## 2023-01-22 NOTE — PROGRESS NOTE ADULT - ASSESSMENT
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 Patient lives at home alone, and is unable to care for herself. She had a visiting nurse service once since discharge who agreed the patient is unsafe to be living at home. Patient was found to be covid positive upon admission. She states that she has a slight sore throat and had one loose BM today, but does not feel any more short of breath than she she has been since last admission. Patient also states that over the last month, she has been having swelling of both lower legs. She denies chest pain/nausea/vomiting/ abdominal pain. Her back pain has been controlled on 20mg oxycodone.     COVID19  Recent atypical infection/viral infection vs possible COPD exacerbation  quit smoking 60 yrs ago smoked 5 yrs 1/2 ppd  previous admission: CT angio PE: no PE, slight patchy GGO diffusely   Left midlung bandlike opacity. Lung volumes. No focal consolidation otherwise. No pneumothorax or pleural effusion.  2-3L home O2, back on baseline requirement  s/p RDV and dose of decadron  - isolation precautions  - monitor labs  - O2 supplement if needed  - incentive spirometry    Severe Thoracic compression fracture T12 complicated by worsening severe back pain, limited ROM, difficulty with ambulation due to pain 2/2 severe compression fx  MRI T-spine: multilevel compression deformities of the thoracic spine  Neurosurgery re-eval appreciated: recommended continued medical management and pain control if patient remains with good strength of bilateral upper and lower extremities -> PT/OT eval  Per neurosurgery,  if not improved, recommend medical optimization/clearance, cardiology clearance and if medically optimized and cleared to transfer to [facility] on 1/23/23 to undergo T5, T9, T12 kyphoplasty on 1/25/23- will get cardio and pulm eval for risk stratification (suspecting patient likely will need surg next week; patient would like kyphoplasty to be done while she is inpatient)   - Per conversation with pain management: patient likely needs neurosurg intervention inpatient; recommended to obtain pre-op risk stratification from pulm and cardio while patient is inhouse  - Pain management eval appreciated: dilaudid PRN, gabapentin, toradol PRN for breakthrough pain  - TLSO brace and PT eval  - CM/social work  - was for STR however apparently patient's insurance lapsed.  She tried to go home with PT program but was not able to care for herself.  Her family is working on insurance and she will need to be admitted here until a safe discharge plan can be put in to place.    Obesity BMI 35  - wt loss per PCP    Hepatic cyst  - f/u OP     Hx/o cerebral aneurysm s/p coiling   - no new deficits.     Hypothyroidism  - cont synthroid    h/o HTN   - c/w amlodipine    B/l LE edema  -evaled by cardio  -c/w Lasix 20mg PO     h/o DLD  - c/w statin     Diet - DASH   DVT ppx: heparin

## 2023-01-22 NOTE — CHART NOTE - NSCHARTNOTEFT_GEN_A_CORE
Patient is tentatively scheduled for wednesday 1/25 please clear patient medically / & cards if needed prior to transfer thank you Patient is tentatively scheduled for wednesday 1/25 please clear patient medically / & cards if needed prior to transfer thank you, please get pulmonary input per covid status thank you Patient is tentatively scheduled for wednesday 1/25 please clear patient medically / & cards prior to transfer thank you, please get pulmonary input per covid status thank you

## 2023-01-22 NOTE — PROGRESS NOTE ADULT - SUBJECTIVE AND OBJECTIVE BOX
PROGRESS NOTE:   Anival Haile MD  Available on teams    Patient is a 82y old  Female who presents with a chief complaint of Worsening back pain (17 Jan 2023 17:29)      SUBJECTIVE / OVERNIGHT EVENTS:  Seen for follow up for back pain  Reports no new complaints  Denies fever, chills, fatigue, chest pain, abdominal pain, nausea/ vomiting or diarrhea  Oxygen improved    ADDITIONAL REVIEW OF SYSTEMS:    MEDICATIONS  (STANDING):  amLODIPine   Tablet 2.5 milliGRAM(s) Oral daily  atorvastatin 20 milliGRAM(s) Oral at bedtime  benzonatate 100 milliGRAM(s) Oral every 8 hours  budesonide 160 MICROgram(s)/formoterol 4.5 MICROgram(s) Inhaler 2 Puff(s) Inhalation two times a day  furosemide    Tablet 20 milliGRAM(s) Oral daily  gabapentin 300 milliGRAM(s) Oral three times a day  heparin   Injectable 5000 Unit(s) SubCutaneous every 12 hours  levothyroxine 25 MICROGram(s) Oral daily  lidocaine   4% Patch 1 Patch Transdermal daily  pantoprazole    Tablet 40 milliGRAM(s) Oral before breakfast  polyethylene glycol 3350 17 Gram(s) Oral daily  remdesivir  IVPB   IV Intermittent   remdesivir  IVPB 100 milliGRAM(s) IV Intermittent every 24 hours  senna 2 Tablet(s) Oral at bedtime  tiotropium 2.5 MICROgram(s) Inhaler 2 Puff(s) Inhalation daily    MEDICATIONS  (PRN):  acetaminophen     Tablet .. 650 milliGRAM(s) Oral every 6 hours PRN Temp greater or equal to 38C (100.4F), Mild Pain (1 - 3)  albuterol    90 MICROgram(s) HFA Inhaler 2 Puff(s) Inhalation every 6 hours PRN Bronchospasm  HYDROmorphone   Tablet 4 milliGRAM(s) Oral every 4 hours PRN Severe Pain (7 - 10)  hydrOXYzine hydrochloride 25 milliGRAM(s) Oral at bedtime PRN Restlessness  ketorolac   Injectable 15 milliGRAM(s) IV Push every 6 hours PRN breakthrough pain        CAPILLARY BLOOD GLUCOSE        I&O's Summary      PHYSICAL EXAM:  Vital Signs Last 24 Hrs  T(C): 35.8 (22 Jan 2023 05:22), Max: 36.2 (21 Jan 2023 21:03)  T(F): 96.5 (22 Jan 2023 05:22), Max: 97.2 (21 Jan 2023 21:03)  HR: 94 (22 Jan 2023 05:22) (94 - 106)  BP: 106/62 (22 Jan 2023 05:22) (106/62 - 118/74)  BP(mean): --  RR: 16 (22 Jan 2023 05:22) (16 - 18)  SpO2: 100% (22 Jan 2023 05:22) (97% - 100%)    Parameters below as of 21 Jan 2023 21:30  Patient On (Oxygen Delivery Method): nasal cannula  O2 Flow (L/min): 3      PHYSICAL EXAM:  GENERAL: NAD, obese  HEAD:  Atraumatic, Normocephalic  EYES: EOMI, PERRLA, conjunctiva and sclera clear  ENMT: No tonsillar erythema, exudates, or enlargement; Moist mucous membranes  NECK: Supple, No JVD, Normal thyroid  NERVOUS SYSTEM:  Alert & Oriented X3, Good concentration; Motor Strength 5/5 B/L upper and lower extremities  CHEST/LUNG: Clear to percussion bilaterally; No rales, rhonchi, wheezing, or rubs  HEART: Regular rate and rhythm; No murmurs, rubs, or gallops  ABDOMEN: Soft, Nontender, Nondistended; Bowel sounds present  EXTREMITIES:  2-3+ edema  BACK: ROM limited due to pain      LABS:                        11.6   6.56  )-----------( 221      ( 19 Jan 2023 08:21 )             36.9     01-19    138  |  100  |  15  ----------------------------<  90  4.0   |  30  |  0.7    Ca    8.4      19 Jan 2023 08:21    TPro  5.5<L>  /  Alb  3.5  /  TBili  0.6  /  DBili  x   /  AST  19  /  ALT  16  /  AlkPhos  108  01-19                RADIOLOGY & ADDITIONAL TESTS:  Results Reviewed:   Imaging Personally Reviewed:  Electrocardiogram Personally Reviewed:    COORDINATION OF CARE:  Care Discussed with Consultants/Other Providers [Y/N]:  Prior or Outpatient Records Reviewed [Y/N]:

## 2023-01-23 PROCEDURE — 99232 SBSQ HOSP IP/OBS MODERATE 35: CPT

## 2023-01-23 RX ADMIN — ATORVASTATIN CALCIUM 20 MILLIGRAM(S): 80 TABLET, FILM COATED ORAL at 21:50

## 2023-01-23 RX ADMIN — AMLODIPINE BESYLATE 2.5 MILLIGRAM(S): 2.5 TABLET ORAL at 05:10

## 2023-01-23 RX ADMIN — SENNA PLUS 2 TABLET(S): 8.6 TABLET ORAL at 21:50

## 2023-01-23 RX ADMIN — Medication 25 MICROGRAM(S): at 05:10

## 2023-01-23 RX ADMIN — PANTOPRAZOLE SODIUM 40 MILLIGRAM(S): 20 TABLET, DELAYED RELEASE ORAL at 06:40

## 2023-01-23 RX ADMIN — Medication 20 MILLIGRAM(S): at 05:12

## 2023-01-23 RX ADMIN — GABAPENTIN 300 MILLIGRAM(S): 400 CAPSULE ORAL at 11:54

## 2023-01-23 RX ADMIN — HYDROMORPHONE HYDROCHLORIDE 4 MILLIGRAM(S): 2 INJECTION INTRAMUSCULAR; INTRAVENOUS; SUBCUTANEOUS at 04:05

## 2023-01-23 RX ADMIN — GABAPENTIN 300 MILLIGRAM(S): 400 CAPSULE ORAL at 05:10

## 2023-01-23 RX ADMIN — HYDROMORPHONE HYDROCHLORIDE 4 MILLIGRAM(S): 2 INJECTION INTRAMUSCULAR; INTRAVENOUS; SUBCUTANEOUS at 10:17

## 2023-01-23 RX ADMIN — HEPARIN SODIUM 5000 UNIT(S): 5000 INJECTION INTRAVENOUS; SUBCUTANEOUS at 17:11

## 2023-01-23 RX ADMIN — BUDESONIDE AND FORMOTEROL FUMARATE DIHYDRATE 2 PUFF(S): 160; 4.5 AEROSOL RESPIRATORY (INHALATION) at 22:18

## 2023-01-23 RX ADMIN — HYDROMORPHONE HYDROCHLORIDE 4 MILLIGRAM(S): 2 INJECTION INTRAMUSCULAR; INTRAVENOUS; SUBCUTANEOUS at 17:13

## 2023-01-23 RX ADMIN — HEPARIN SODIUM 5000 UNIT(S): 5000 INJECTION INTRAVENOUS; SUBCUTANEOUS at 05:11

## 2023-01-23 RX ADMIN — GABAPENTIN 300 MILLIGRAM(S): 400 CAPSULE ORAL at 21:50

## 2023-01-23 RX ADMIN — HYDROMORPHONE HYDROCHLORIDE 4 MILLIGRAM(S): 2 INJECTION INTRAMUSCULAR; INTRAVENOUS; SUBCUTANEOUS at 03:34

## 2023-01-23 NOTE — PROGRESS NOTE ADULT - SUBJECTIVE AND OBJECTIVE BOX
PROGRESS NOTE:   Anival Haile MD  Available on teams    Patient is a 82y old  Female who presents with a chief complaint of Worsening back pain (17 Jan 2023 17:29)      SUBJECTIVE / OVERNIGHT EVENTS:  Seen for follow up for back pain  Reports no new complaints  Denies fever, chills, fatigue, chest pain, abdominal pain, nausea/ vomiting or diarrhea  Oxygen stable    ADDITIONAL REVIEW OF SYSTEMS:    MEDICATIONS  (STANDING):  amLODIPine   Tablet 2.5 milliGRAM(s) Oral daily  atorvastatin 20 milliGRAM(s) Oral at bedtime  benzonatate 100 milliGRAM(s) Oral every 8 hours  budesonide 160 MICROgram(s)/formoterol 4.5 MICROgram(s) Inhaler 2 Puff(s) Inhalation two times a day  furosemide    Tablet 20 milliGRAM(s) Oral daily  gabapentin 300 milliGRAM(s) Oral three times a day  heparin   Injectable 5000 Unit(s) SubCutaneous every 12 hours  levothyroxine 25 MICROGram(s) Oral daily  lidocaine   4% Patch 1 Patch Transdermal daily  pantoprazole    Tablet 40 milliGRAM(s) Oral before breakfast  polyethylene glycol 3350 17 Gram(s) Oral daily  remdesivir  IVPB   IV Intermittent   remdesivir  IVPB 100 milliGRAM(s) IV Intermittent every 24 hours  senna 2 Tablet(s) Oral at bedtime  tiotropium 2.5 MICROgram(s) Inhaler 2 Puff(s) Inhalation daily    MEDICATIONS  (PRN):  acetaminophen     Tablet .. 650 milliGRAM(s) Oral every 6 hours PRN Temp greater or equal to 38C (100.4F), Mild Pain (1 - 3)  albuterol    90 MICROgram(s) HFA Inhaler 2 Puff(s) Inhalation every 6 hours PRN Bronchospasm  HYDROmorphone   Tablet 4 milliGRAM(s) Oral every 4 hours PRN Severe Pain (7 - 10)  hydrOXYzine hydrochloride 25 milliGRAM(s) Oral at bedtime PRN Restlessness  ketorolac   Injectable 15 milliGRAM(s) IV Push every 6 hours PRN breakthrough pain        CAPILLARY BLOOD GLUCOSE        I&O's Summary      PHYSICAL EXAM:  Vital Signs Last 24 Hrs  T(C): 36.2 (23 Jan 2023 05:51), Max: 36.2 (22 Jan 2023 21:03)  T(F): 97.1 (23 Jan 2023 05:51), Max: 97.2 (22 Jan 2023 21:03)  HR: 100 (23 Jan 2023 05:51) (100 - 113)  BP: 116/80 (23 Jan 2023 05:51) (116/80 - 130/65)  BP(mean): --  RR: 16 (23 Jan 2023 05:51) (16 - 17)  SpO2: 94% (23 Jan 2023 07:55) (94% - 95%)    Parameters below as of 23 Jan 2023 07:55  Patient On (Oxygen Delivery Method): nasal cannula  O2 Flow (L/min): 4.5        PHYSICAL EXAM:  GENERAL: NAD, obese  HEAD:  Atraumatic, Normocephalic  EYES: EOMI, PERRLA, conjunctiva and sclera clear  ENMT: No tonsillar erythema, exudates, or enlargement; Moist mucous membranes  NECK: Supple, No JVD, Normal thyroid  NERVOUS SYSTEM:  Alert & Oriented X3, Good concentration; Motor Strength 5/5 B/L upper and lower extremities  CHEST/LUNG: Clear to percussion bilaterally; No rales, rhonchi, wheezing, or rubs  HEART: Regular rate and rhythm; No murmurs, rubs, or gallops  ABDOMEN: Soft, Nontender, Nondistended; Bowel sounds present  EXTREMITIES:  2-3+ edema  BACK: ROM limited due to pain      LABS:                        11.6   6.56  )-----------( 221      ( 19 Jan 2023 08:21 )             36.9     01-19    138  |  100  |  15  ----------------------------<  90  4.0   |  30  |  0.7    Ca    8.4      19 Jan 2023 08:21    TPro  5.5<L>  /  Alb  3.5  /  TBili  0.6  /  DBili  x   /  AST  19  /  ALT  16  /  AlkPhos  108  01-19                RADIOLOGY & ADDITIONAL TESTS:  Results Reviewed:   Imaging Personally Reviewed:  Electrocardiogram Personally Reviewed:    COORDINATION OF CARE:  Care Discussed with Consultants/Other Providers [Y/N]:  Prior or Outpatient Records Reviewed [Y/N]:

## 2023-01-23 NOTE — PROGRESS NOTE ADULT - ASSESSMENT
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 Patient lives at home alone, and is unable to care for herself. She had a visiting nurse service once since discharge who agreed the patient is unsafe to be living at home. Patient was found to be covid positive upon admission. She states that she has a slight sore throat and had one loose BM today, but does not feel any more short of breath than she she has been since last admission. Patient also states that over the last month, she has been having swelling of both lower legs. She denies chest pain/nausea/vomiting/ abdominal pain. Her back pain has been controlled on 20mg oxycodone.     COVID19  Recent atypical infection/viral infection vs possible COPD exacerbation  quit smoking 60 yrs ago smoked 5 yrs 1/2 ppd  previous admission: CT angio PE: no PE, slight patchy GGO diffusely   Left midlung bandlike opacity. Lung volumes. No focal consolidation otherwise. No pneumothorax or pleural effusion.  2-3L home O2, back on baseline requirement  s/p RDV and dose of decadron  - isolation precautions  - monitor labs  - O2 supplement if needed  - incentive spirometry    Severe Thoracic compression fracture T12 complicated by worsening severe back pain, limited ROM, difficulty with ambulation due to pain 2/2 severe compression fx  MRI T-spine: multilevel compression deformities of the thoracic spine  Neurosurgery re-eval appreciated: recommended continued medical management and pain control if patient remains with good strength of bilateral upper and lower extremities -> PT/OT eval  Per neurosurgery,  if not improved, recommend medical optimization/clearance, cardiology clearance and if medically optimized and cleared to transfer to [facility] on 1/23/23 to undergo T5, T9, T12 kyphoplasty on 1/25/23- will get cardio and pulm eval for risk stratification (suspecting patient likely will need surg next week; patient would like kyphoplasty to be done while she is inpatient)   - Per conversation with pain management: patient likely needs neurosurg intervention inpatient; recommended to obtain pre-op risk stratification from pulm and cardio while patient is inhouse  - Cardiology and pulmonary clearance(in addition to covid status) complete  - Pain management eval appreciated: dilaudid PRN, gabapentin, toradol PRN for breakthrough pain  - TLSO brace and PT eval  - CM/social work  - was for STR however apparently patient's insurance lapsed.  She tried to go home with PT program but was not able to care for herself.  Her family is working on insurance and she will need to be admitted here until a safe discharge plan can be put in to place.    Obesity BMI 35  - wt loss per PCP    Hepatic cyst  - f/u OP     Hx/o cerebral aneurysm s/p coiling   - no new deficits.     Hypothyroidism  - cont synthroid    h/o HTN   - c/w amlodipine    B/l LE edema  -evaled by cardio  -c/w Lasix 20mg PO     h/o DLD  - c/w statin     Diet - DASH   DVT ppx: heparin    Dispo: Pending transfer to [place] in preparation for kyphoplasty on 1/25

## 2023-01-24 LAB
ALBUMIN SERPL ELPH-MCNC: 3.5 G/DL — SIGNIFICANT CHANGE UP (ref 3.5–5.2)
ALP SERPL-CCNC: 135 U/L — HIGH (ref 30–115)
ALT FLD-CCNC: 10 U/L — SIGNIFICANT CHANGE UP (ref 0–41)
ANION GAP SERPL CALC-SCNC: 13 MMOL/L — SIGNIFICANT CHANGE UP (ref 7–14)
APTT BLD: 30.5 SEC — SIGNIFICANT CHANGE UP (ref 27–39.2)
AST SERPL-CCNC: 19 U/L — SIGNIFICANT CHANGE UP (ref 0–41)
BASOPHILS # BLD AUTO: 0.04 K/UL — SIGNIFICANT CHANGE UP (ref 0–0.2)
BASOPHILS NFR BLD AUTO: 0.4 % — SIGNIFICANT CHANGE UP (ref 0–1)
BILIRUB SERPL-MCNC: 1.3 MG/DL — HIGH (ref 0.2–1.2)
BLD GP AB SCN SERPL QL: SIGNIFICANT CHANGE UP
BUN SERPL-MCNC: 10 MG/DL — SIGNIFICANT CHANGE UP (ref 10–20)
CALCIUM SERPL-MCNC: 8.8 MG/DL — SIGNIFICANT CHANGE UP (ref 8.4–10.5)
CHLORIDE SERPL-SCNC: 92 MMOL/L — LOW (ref 98–110)
CO2 SERPL-SCNC: 30 MMOL/L — SIGNIFICANT CHANGE UP (ref 17–32)
CREAT SERPL-MCNC: 0.8 MG/DL — SIGNIFICANT CHANGE UP (ref 0.7–1.5)
EGFR: 74 ML/MIN/1.73M2 — SIGNIFICANT CHANGE UP
EOSINOPHIL # BLD AUTO: 0.12 K/UL — SIGNIFICANT CHANGE UP (ref 0–0.7)
EOSINOPHIL NFR BLD AUTO: 1.2 % — SIGNIFICANT CHANGE UP (ref 0–8)
GLUCOSE SERPL-MCNC: 141 MG/DL — HIGH (ref 70–99)
HCT VFR BLD CALC: 36 % — LOW (ref 37–47)
HGB BLD-MCNC: 11.9 G/DL — LOW (ref 12–16)
IMM GRANULOCYTES NFR BLD AUTO: 0.4 % — HIGH (ref 0.1–0.3)
INR BLD: 1.08 RATIO — SIGNIFICANT CHANGE UP (ref 0.65–1.3)
LYMPHOCYTES # BLD AUTO: 1.42 K/UL — SIGNIFICANT CHANGE UP (ref 1.2–3.4)
LYMPHOCYTES # BLD AUTO: 14.1 % — LOW (ref 20.5–51.1)
MAGNESIUM SERPL-MCNC: 1.7 MG/DL — LOW (ref 1.8–2.4)
MCHC RBC-ENTMCNC: 30.7 PG — SIGNIFICANT CHANGE UP (ref 27–31)
MCHC RBC-ENTMCNC: 33.1 G/DL — SIGNIFICANT CHANGE UP (ref 32–37)
MCV RBC AUTO: 92.8 FL — SIGNIFICANT CHANGE UP (ref 81–99)
MONOCYTES # BLD AUTO: 1.34 K/UL — HIGH (ref 0.1–0.6)
MONOCYTES NFR BLD AUTO: 13.3 % — HIGH (ref 1.7–9.3)
NEUTROPHILS # BLD AUTO: 7.12 K/UL — HIGH (ref 1.4–6.5)
NEUTROPHILS NFR BLD AUTO: 70.6 % — SIGNIFICANT CHANGE UP (ref 42.2–75.2)
NRBC # BLD: 0 /100 WBCS — SIGNIFICANT CHANGE UP (ref 0–0)
NT-PROBNP SERPL-SCNC: 224 PG/ML — SIGNIFICANT CHANGE UP (ref 0–300)
PHOSPHATE SERPL-MCNC: 2.8 MG/DL — SIGNIFICANT CHANGE UP (ref 2.1–4.9)
PLATELET # BLD AUTO: 243 K/UL — SIGNIFICANT CHANGE UP (ref 130–400)
POTASSIUM SERPL-MCNC: 3.4 MMOL/L — LOW (ref 3.5–5)
POTASSIUM SERPL-SCNC: 3.4 MMOL/L — LOW (ref 3.5–5)
PROT SERPL-MCNC: 5.9 G/DL — LOW (ref 6–8)
PROTHROM AB SERPL-ACNC: 12.3 SEC — SIGNIFICANT CHANGE UP (ref 9.95–12.87)
RBC # BLD: 3.88 M/UL — LOW (ref 4.2–5.4)
RBC # FLD: 13.9 % — SIGNIFICANT CHANGE UP (ref 11.5–14.5)
SODIUM SERPL-SCNC: 135 MMOL/L — SIGNIFICANT CHANGE UP (ref 135–146)
TROPONIN T SERPL-MCNC: <0.01 NG/ML — SIGNIFICANT CHANGE UP
WBC # BLD: 10.08 K/UL — SIGNIFICANT CHANGE UP (ref 4.8–10.8)
WBC # FLD AUTO: 10.08 K/UL — SIGNIFICANT CHANGE UP (ref 4.8–10.8)

## 2023-01-24 PROCEDURE — 99233 SBSQ HOSP IP/OBS HIGH 50: CPT

## 2023-01-24 PROCEDURE — 99221 1ST HOSP IP/OBS SF/LOW 40: CPT

## 2023-01-24 PROCEDURE — 93010 ELECTROCARDIOGRAM REPORT: CPT

## 2023-01-24 PROCEDURE — 71275 CT ANGIOGRAPHY CHEST: CPT | Mod: 26

## 2023-01-24 PROCEDURE — 93970 EXTREMITY STUDY: CPT | Mod: 26

## 2023-01-24 PROCEDURE — 99221 1ST HOSP IP/OBS SF/LOW 40: CPT | Mod: GC

## 2023-01-24 PROCEDURE — 71045 X-RAY EXAM CHEST 1 VIEW: CPT | Mod: 26

## 2023-01-24 RX ORDER — MAGNESIUM SULFATE 500 MG/ML
2 VIAL (ML) INJECTION ONCE
Refills: 0 | Status: COMPLETED | OUTPATIENT
Start: 2023-01-24 | End: 2023-01-24

## 2023-01-24 RX ORDER — ENOXAPARIN SODIUM 100 MG/ML
80 INJECTION SUBCUTANEOUS EVERY 12 HOURS
Refills: 0 | Status: DISCONTINUED | OUTPATIENT
Start: 2023-01-24 | End: 2023-01-27

## 2023-01-24 RX ORDER — SODIUM CHLORIDE 0.65 %
1 AEROSOL, SPRAY (ML) NASAL
Refills: 0 | Status: DISCONTINUED | OUTPATIENT
Start: 2023-01-24 | End: 2023-01-30

## 2023-01-24 RX ORDER — ENOXAPARIN SODIUM 100 MG/ML
77 INJECTION SUBCUTANEOUS EVERY 12 HOURS
Refills: 0 | Status: DISCONTINUED | OUTPATIENT
Start: 2023-01-24 | End: 2023-01-24

## 2023-01-24 RX ORDER — ENOXAPARIN SODIUM 100 MG/ML
80 INJECTION SUBCUTANEOUS EVERY 12 HOURS
Refills: 0 | Status: DISCONTINUED | OUTPATIENT
Start: 2023-01-24 | End: 2023-01-24

## 2023-01-24 RX ORDER — ENOXAPARIN SODIUM 100 MG/ML
77 INJECTION SUBCUTANEOUS EVERY 24 HOURS
Refills: 0 | Status: DISCONTINUED | OUTPATIENT
Start: 2023-01-24 | End: 2023-01-24

## 2023-01-24 RX ORDER — POTASSIUM CHLORIDE 20 MEQ
40 PACKET (EA) ORAL ONCE
Refills: 0 | Status: COMPLETED | OUTPATIENT
Start: 2023-01-24 | End: 2023-01-24

## 2023-01-24 RX ADMIN — HEPARIN SODIUM 5000 UNIT(S): 5000 INJECTION INTRAVENOUS; SUBCUTANEOUS at 05:16

## 2023-01-24 RX ADMIN — POLYETHYLENE GLYCOL 3350 17 GRAM(S): 17 POWDER, FOR SOLUTION ORAL at 17:04

## 2023-01-24 RX ADMIN — SENNA PLUS 2 TABLET(S): 8.6 TABLET ORAL at 22:11

## 2023-01-24 RX ADMIN — Medication 25 GRAM(S): at 16:50

## 2023-01-24 RX ADMIN — HYDROMORPHONE HYDROCHLORIDE 4 MILLIGRAM(S): 2 INJECTION INTRAMUSCULAR; INTRAVENOUS; SUBCUTANEOUS at 01:00

## 2023-01-24 RX ADMIN — HYDROMORPHONE HYDROCHLORIDE 4 MILLIGRAM(S): 2 INJECTION INTRAMUSCULAR; INTRAVENOUS; SUBCUTANEOUS at 19:51

## 2023-01-24 RX ADMIN — ENOXAPARIN SODIUM 80 MILLIGRAM(S): 100 INJECTION SUBCUTANEOUS at 17:03

## 2023-01-24 RX ADMIN — Medication 20 MILLIGRAM(S): at 05:17

## 2023-01-24 RX ADMIN — Medication 25 MICROGRAM(S): at 05:17

## 2023-01-24 RX ADMIN — HYDROMORPHONE HYDROCHLORIDE 4 MILLIGRAM(S): 2 INJECTION INTRAMUSCULAR; INTRAVENOUS; SUBCUTANEOUS at 10:32

## 2023-01-24 RX ADMIN — GABAPENTIN 300 MILLIGRAM(S): 400 CAPSULE ORAL at 05:17

## 2023-01-24 RX ADMIN — AMLODIPINE BESYLATE 2.5 MILLIGRAM(S): 2.5 TABLET ORAL at 05:17

## 2023-01-24 RX ADMIN — HYDROMORPHONE HYDROCHLORIDE 4 MILLIGRAM(S): 2 INJECTION INTRAMUSCULAR; INTRAVENOUS; SUBCUTANEOUS at 20:06

## 2023-01-24 RX ADMIN — PANTOPRAZOLE SODIUM 40 MILLIGRAM(S): 20 TABLET, DELAYED RELEASE ORAL at 05:18

## 2023-01-24 RX ADMIN — HYDROMORPHONE HYDROCHLORIDE 4 MILLIGRAM(S): 2 INJECTION INTRAMUSCULAR; INTRAVENOUS; SUBCUTANEOUS at 00:30

## 2023-01-24 RX ADMIN — Medication 1 SPRAY(S): at 16:48

## 2023-01-24 RX ADMIN — GABAPENTIN 300 MILLIGRAM(S): 400 CAPSULE ORAL at 22:11

## 2023-01-24 RX ADMIN — Medication 650 MILLIGRAM(S): at 16:47

## 2023-01-24 RX ADMIN — Medication 40 MILLIEQUIVALENT(S): at 18:26

## 2023-01-24 RX ADMIN — GABAPENTIN 300 MILLIGRAM(S): 400 CAPSULE ORAL at 14:08

## 2023-01-24 RX ADMIN — ATORVASTATIN CALCIUM 20 MILLIGRAM(S): 80 TABLET, FILM COATED ORAL at 22:11

## 2023-01-24 NOTE — CHART NOTE - NSCHARTNOTEFT_GEN_A_CORE
Received a call from radiology. Patient found to have a Right Lower Lobe PE. Waiting on [facility] to clear patient for Anticoagulation. Team spoke with Neurosurgery earlier, who reached out to [facility].

## 2023-01-24 NOTE — PROGRESS NOTE ADULT - SUBJECTIVE AND OBJECTIVE BOX
SUBJECTIVE:    Patient is a 82y old Female who presents with a chief complaint of back pain (22 Jan 2023 09:15)      HPI:  82 year old female with past medical history of DLD, ILD on 2-3L home O2, compression fx thoracic spine, IVC filter, and hypothyroidism presents after recent discharge for shortness of breath and back pain. Patient lives at home alone, and is unable to care for herself. She had a visiting nurse service once since discharge who agreed the patient is unsafe to be living at home. Patient was found to be covid positive upon admission. She states that she has a slight sore throat and had one loose BM today, but does not feel any more short of breath than she she has been since last admission. Patient also states that over the last month, she has been having swelling of both lower legs. She denies chest pain/nausea/vomiting/ abdominal pain. Her back pain has been controlled on 20mg oxycodone.  (16 Jan 2023 16:43)      Currently admitted to medicine with the primary diagnosis of 2019 novel coronavirus disease (COVID-19)     not feeling any increase in her shortness of breath, stating her nose is dry    Besides the pertinent positives and negatives described above, the ROS was within normal limits.    PAST MEDICAL & SURGICAL HISTORY  Hypothyroid    Hyperlipemia    Brain aneurysm    Compression fracture of spine    Presence of IVC filter    S/P appendectomy    S/P coil embolization of cerebral aneurysm      SOCIAL HISTORY:    ALLERGIES:  Compazine (Unknown)    MEDICATIONS:  STANDING MEDICATIONS  amLODIPine   Tablet 2.5 milliGRAM(s) Oral daily  atorvastatin 20 milliGRAM(s) Oral at bedtime  benzonatate 100 milliGRAM(s) Oral every 8 hours  budesonide 160 MICROgram(s)/formoterol 4.5 MICROgram(s) Inhaler 2 Puff(s) Inhalation two times a day  furosemide    Tablet 20 milliGRAM(s) Oral daily  gabapentin 300 milliGRAM(s) Oral three times a day  levothyroxine 25 MICROGram(s) Oral daily  lidocaine   4% Patch 1 Patch Transdermal daily  pantoprazole    Tablet 40 milliGRAM(s) Oral before breakfast  polyethylene glycol 3350 17 Gram(s) Oral daily  remdesivir  IVPB   IV Intermittent   remdesivir  IVPB 100 milliGRAM(s) IV Intermittent every 24 hours  senna 2 Tablet(s) Oral at bedtime  tiotropium 2.5 MICROgram(s) Inhaler 2 Puff(s) Inhalation daily    PRN MEDICATIONS  acetaminophen     Tablet .. 650 milliGRAM(s) Oral every 6 hours PRN  albuterol    90 MICROgram(s) HFA Inhaler 2 Puff(s) Inhalation every 6 hours PRN  HYDROmorphone   Tablet 4 milliGRAM(s) Oral every 4 hours PRN  hydrOXYzine hydrochloride 25 milliGRAM(s) Oral at bedtime PRN  sodium chloride 0.65% Nasal 1 Spray(s) Both Nostrils four times a day PRN    VITALS:   T(F): 96.9  HR: 110  BP: 122/84  RR: 18  SpO2: 96%    LABS:                        2019 novel coronavirus disease (COVID-19)      RADIOLOGY:    PHYSICAL EXAM:  General: WN/WD NAD  Neurology: A&Ox3, nonfocal, SEGURA x 4  Head:  Normocephalic, atraumatic  ENT:  Mucosa moist, no ulcerations  Neck:  Supple, no sinuses or palpable masses  Lymphatic:  No palpable cervical, supraclavicular, axillary or inguinal adenopathy  Respiratory: CTA B/L  CV: RRR, S1S2, no murmur  Abdominal: Soft, NT, ND no palpable mass  MSK: +2 bl le edema  Incisions: intact, no erythema or drainage    Intravenous access: yes  NG tube: no  [place] Catheter: no

## 2023-01-24 NOTE — CHART NOTE - NSCHARTNOTEFT_GEN_A_CORE
Notified by vascular lab that patient had extensive acute DVTs on repeat duplex today. Last duplex on 1/16th performed at [facility] was negative.   - Patient has IVC filter placed in 2010. She has Hx of aneurysm s/p coiling. Developed DVT during that time and had IVC filter placed.   - Consulted neuroendovascular team if ok to start anticoagulation.   - Consulted Vascular team for eval of IVC filter and possible thrombectomy  - CTA PE protocol ordered as patient is still reporting sob and is tachycardic  - Spoke to neurosurgery team. Procedure likely cancelled in the setting of new developments    - Will send stat labs when patient returns to room   - Spoke to daughter Solange [phone]. States that patient was living on her own performing all ADLs up until her admission in December to [facility] for SOB/ Dyspnea and worsening LE swelling. At that time CTA was negative for PE, duplex also negative. She was discharged and readmitted within 2 days with no improvement in swelling of her legs. All questions answered.   - At this time, patient remains full code until Solange speaks with patient about code status.

## 2023-01-24 NOTE — CONSULT NOTE ADULT - ASSESSMENT
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 Patient lives at home alone, and is unable to care for herself. She had a visiting nurse service once since discharge who agreed the patient is unsafe to be living at home. Patient was found to be covid positive upon admission. She states that she has a slight sore throat and had one loose BM today, but does not feel any more short of breath than she she has been since last admission. Patient also states that over the last month, she has been having swelling of both lower legs. She denies chest pain/nausea/vomiting/ abdominal pain. Her back pain has been controlled on 20mg oxycodone.  (16 Jan 2023 16:43)    Vascular surgery consulted for RLE DVT. Patient states 10 years ago she had an IVC filter placed, but says it was "prophylactic" during a time she was in a coma with a brain aneurysm that was eventually coiled. She has never been on anticoagulation. She states her legs have been swollen for the last couple of weeks. Prior to admission she states she was doing all ADLs. She is admitted to the hospital with back pain and supposed to undergo a kyphoplasty. Today she has leg swelling, shortness of breath and tachycardia. She is awaiting a CTA Chest to r/o PE. She was COVID+ on 1/16, on 2LNC and on Home o2.  .    PLAN:   RLEx DVT: common femoral, femoral, popliteal veins  - if OK with neurosurgery would recommend anticoagulation for extensive DVT, may be chronic  - f/u CTA chest to r/o PE and eval IVC filter  - Plan to be discussed with Attending, Dr. Ten [phone] [phone]

## 2023-01-24 NOTE — CHART NOTE - NSCHARTNOTEFT_GEN_A_CORE
Neuroendovascular team consulted regarding starting AC for DVT/PE given history of large prior ruptured intracranial aneurysm s/p coil embolization with Dr. Helm in 2010.   Discussed case with Dr. Montesinos. Ok to start therapeutic AC for DVT/PE.   Please obtain MRA brain during this hospitalization for aneurysm f/u.     Discussed above earlier with neurosurgery and Dr. Bernardo.     Formal consult note to follow.   [phone] neuroendovascular

## 2023-01-24 NOTE — CHART NOTE - NSCHARTNOTEFT_GEN_A_CORE
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 Patient lives at home alone, and is unable to care for herself. She had a visiting nurse service once since discharge who agreed the patient is unsafe to be living at home. Patient was found to be covid positive upon admission. She states that she has a slight sore throat and had one loose BM today, but does not feel any more short of breath than she she has been since last admission. Patient also states that over the last month, she has been having swelling of both lower legs. She denies chest pain/nausea/vomiting/ abdominal pain. Her back pain has been controlled on 20mg oxycodone.     Neurosurgery consulted for a patient transferred here from [facility] for back pain, now planning for T5, T9, & T12 Kyphoplasty 1/25 with Dr. Hwang.   States that she has worsening back pain, but otherwise has residual L sided slight weakness & numbness from residual brain aneurysm in 2010 treated by coiling by Dr. Helm.     ~   During medical work up, patient was noted to have acute DVTs which were new since 1/16/2023.   < from: [facility] Duplex Lower Ext Vein Scan, Bilat (01.24.23 @ 12:02) >  FINDINGS:  RIGHT:  DVT is visualized in common femoral, femoral, popliteal veins.  Calf veins were not visualized due to swelling.  LEFT:  Normal compressibility of the LEFT common femoral, femoral veins.  Doppler examination shows normal spontaneous and phasic flow.  DVT is visualized in left popliteal vein  Calf veins were not visualized due to swelling.  IMPRESSION:  DVT is visualized in the right common femoral, femoral, popliteal veins.  DVT is visualized in left popliteal vein.  Bilateral calf veins were not visualized due to swelling.    Per medical team, would likely not be medically cleared for the OR tomorrow for kyphoplasty. Patient additionally going to obtain CTA chest to r/o PE since patient is newly SOB with tachycardia. Additionally pt to obtain TTE.   Kyphoplasty postponed for now until patient is medically cleared & optimized. Patient informed of recent events & cancellation.   D/W attending. [phone]

## 2023-01-24 NOTE — CONSULT NOTE ADULT - SUBJECTIVE AND OBJECTIVE BOX
Neuroendovascular Consult:   Consulted for:  Clearance for anticoagulation    HPI:  82 year old female with past medical history of DLD, ILD on 2-3L home O2, compression fx thoracic spine, IVC filter, and hypothyroidism presents after recent discharge for shortness of breath and back pain. Patient lives at home alone, and is unable to care for herself. She had a visiting nurse service once since discharge who agreed the patient is unsafe to be living at home. Patient was found to be covid positive upon admission. She states that she has a slight sore throat and had one loose BM today, but does not feel any more short of breath than she she has been since last admission. Patient also states that over the last month, she has been having swelling of both lower legs. She denies chest pain/nausea/vomiting/ abdominal pain. Her back pain has been controlled on 20mg oxycodone.  (16 Jan 2023 16:43)    Interval HPI:   A neuroendovascular consult was placed for clearance for anticoagulant use for DVT/PE management given history of coiled, ruptured right supraclinoid aneurysm with Dr. Dennison in 2010.    PAST MEDICAL & SURGICAL HISTORY:  Hypothyroid  Hyperlipemia  Brain aneurysm  Compression fracture of spine  Presence of IVC filter  S/P appendectomy  S/P coil embolization of cerebral aneurysm    Pertinent PMHx   MEDICATIONS  (STANDING):  amLODIPine   Tablet 2.5 milliGRAM(s) Oral daily  atorvastatin 20 milliGRAM(s) Oral at bedtime  benzonatate 100 milliGRAM(s) Oral every 8 hours  budesonide 160 MICROgram(s)/formoterol 4.5 MICROgram(s) Inhaler 2 Puff(s) Inhalation two times a day  enoxaparin Injectable 80 milliGRAM(s) SubCutaneous every 12 hours  furosemide    Tablet 20 milliGRAM(s) Oral daily  gabapentin 300 milliGRAM(s) Oral three times a day  levothyroxine 25 MICROGram(s) Oral daily  lidocaine   4% Patch 1 Patch Transdermal daily  pantoprazole    Tablet 40 milliGRAM(s) Oral before breakfast  polyethylene glycol 3350 17 Gram(s) Oral daily  potassium chloride   Powder 40 milliEquivalent(s) Oral once  senna 2 Tablet(s) Oral at bedtime  tiotropium 2.5 MICROgram(s) Inhaler 2 Puff(s) Inhalation daily    MEDICATIONS  (PRN):  acetaminophen     Tablet .. 650 milliGRAM(s) Oral every 6 hours PRN Temp greater or equal to 38C (100.4F), Mild Pain (1 - 3)  albuterol    90 MICROgram(s) HFA Inhaler 2 Puff(s) Inhalation every 6 hours PRN Bronchospasm  HYDROmorphone   Tablet 4 milliGRAM(s) Oral every 4 hours PRN Severe Pain (7 - 10)  hydrOXYzine hydrochloride 25 milliGRAM(s) Oral at bedtime PRN Restlessness  sodium chloride 0.65% Nasal 1 Spray(s) Both Nostrils four times a day PRN Nasal Congestion    Allergies  Compazine (Unknown)    Physical Exam:   Vital Signs Last 24 Hrs  T(C): 36.9 (24 Jan 2023 16:52), Max: 36.9 (24 Jan 2023 16:52)  T(F): 98.4 (24 Jan 2023 16:52), Max: 98.4 (24 Jan 2023 16:52)  HR: 99 (24 Jan 2023 16:52) (99 - 113)  BP: 123/65 (24 Jan 2023 16:52) (110/67 - 150/64)  BP(mean): --  RR: 18 (24 Jan 2023 16:52) (18 - 18)  SpO2: 96% (24 Jan 2023 16:52) (95% - 97%)    Parameters below as of 24 Jan 2023 14:09  Patient On (Oxygen Delivery Method): nasal cannula    General:  No acute distress  Neuro: AAOx3, following commands, PERRL, EOMI, no dysarthria or aphasia, moving all extremities antigravity x 4, +decreased LLE sensation, no neglect.  NIHSS: 1 for sensation.    Labs:                         11.9   10.08 )-----------( 243      ( 24 Jan 2023 13:30 )             36.0     01-24    135  |  92<L>  |  10  ----------------------------<  141<H>  3.4<L>   |  30  |  0.8    Ca    8.8      24 Jan 2023 13:30  Phos  2.8     01-24  Mg     1.7     01-24    TPro  5.9<L>  /  Alb  3.5  /  TBili  1.3<H>  /  DBili  x   /  AST  19  /  ALT  10  /  AlkPhos  135<H>  01-24    PT/INR - ( 24 Jan 2023 15:17 )   PT: 12.30 sec;   INR: 1.08 ratio         PTT - ( 24 Jan 2023 15:17 )  PTT:30.5 sec    Pertinent labs:                      11.9   10.08 )-----------( 243      ( 24 Jan 2023 13:30 )             36.0       01-24    135  |  92<L>  |  10  ----------------------------<  141<H>  3.4<L>   |  30  |  0.8    Ca    8.8      24 Jan 2023 13:30  Phos  2.8     01-24  Mg     1.7     01-24    TPro  5.9<L>  /  Alb  3.5  /  TBili  1.3<H>  /  DBili  x   /  AST  19  /  ALT  10  /  AlkPhos  135<H>  01-24    PT/INR - ( 24 Jan 2023 15:17 )   PT: 12.30 sec;   INR: 1.08 ratio      PTT - ( 24 Jan 2023 15:17 )  PTT:30.5 sec    Radiology & Additional Studies:   Radiology imaging reviewed.       Assessment:  82-year-old female with past medical history of DLD, ILD on 2-3 L home O2, compression fx thoracic spine, IVC filter, and hypothyroidism presents after recent discharge for shortness of breath and back pain. A neuroendovascular consult was placed for clearance for anticoagulant use for DVT/PE management given history of coiled, ruptured right supraclinoid aneurysm with Dr. Dennison in 2010.    Suggestions:   - From a neuroendovascular perspective, given the patient's history of aneurysm rupture s/p coil, the patient may be placed on therapeutic anticoagulation for DVT/PE.   - Please obtain an MRA brain during this hospital stay.  - Management per primary team.  [phone] [facility] with additional questions/ concerns on this admission.    Thank you for the courtesy of this consult.

## 2023-01-24 NOTE — CONSULT NOTE ADULT - SUBJECTIVE AND OBJECTIVE BOX
VASCULAR SURGERY CONSULT NOTE    HPI:  82 year old female with past medical history of DLD, ILD on 2-3L home O2, compression fx thoracic spine, IVC filter, and hypothyroidism presents after recent discharge for shortness of breath and back pain. Patient lives at home alone, and is unable to care for herself. She had a visiting nurse service once since discharge who agreed the patient is unsafe to be living at home. Patient was found to be covid positive upon admission. She states that she has a slight sore throat and had one loose BM today, but does not feel any more short of breath than she she has been since last admission. Patient also states that over the last month, she has been having swelling of both lower legs. She denies chest pain/nausea/vomiting/ abdominal pain. Her back pain has been controlled on 20mg oxycodone.  (16 Jan 2023 16:43)    Vascular surgery consulted for RLE DVT. Patient states 10 years ago she had an IVC filter placed, but says it was "prophylactic" during a time she was in a coma with a brain aneurysm that was eventually coiled. She has never been on anticoagulation. She states her legs have been swollen for the last couple of weeks. Prior to admission she states she was doing all ADLs.      PAST MEDICAL & SURGICAL HISTORY:  Hypothyroid  Hyperlipemia  Brain aneurysm  Compression fracture of spine  Presence of IVC filter  S/P appendectomy  S/P coil embolization of cerebral aneurysm      Compazine (Unknown)    Home Medications:  acetaminophen 325 mg oral tablet: 2 tab(s) orally every 6 hours (16 Jan 2023 12:06)  ALBUTEROL HFA 90 MCG INHALER:  (16 Jan 2023 12:06)  AMLODIPINE BESYLATE 2.5 MG TAB:  (16 Jan 2023 12:06)  ATORVASTATIN 10 MG TABLET: tab(s) orally once a day (at bedtime) (16 Jan 2023 12:06)  guaifenesin-dextromethorphan 100 mg-10 mg/5 mL oral liquid: 10 milliliter(s) orally every 6 hours, As needed, Cough (16 Jan 2023 12:06)  LEVOTHYROXINE 25 MCG TABLET: tab(s) orally once a day (16 Jan 2023 12:06)  lidocaine 4% topical film: Apply topically to affected area once a day (16 Jan 2023 12:06)  polyethylene glycol 3350 oral powder for reconstitution: 17 gram(s) orally once a day (16 Jan 2023 12:06)  senna leaf extract oral tablet: 2 tab(s) orally once a day (at bedtime) (16 Jan 2023 12:06)    12 point ROS otherwise normal except as stated in HPI    PHYSICAL EXAM  Vital Signs Last 24 Hrs  T(C): 36.1 (24 Jan 2023 08:48), Max: 36.7 (23 Jan 2023 22:16)  T(F): 96.9 (24 Jan 2023 08:48), Max: 98.1 (23 Jan 2023 22:16)  HR: 110 (24 Jan 2023 08:48) (102 - 110)  BP: 122/84 (24 Jan 2023 08:48) (110/67 - 150/64)  BP(mean): --  RR: 18 (24 Jan 2023 08:48) (18 - 18)  SpO2: 96% (24 Jan 2023 08:48) (95% - 97%)    Parameters below as of 24 Jan 2023 08:48  Patient On (Oxygen Delivery Method): nasal cannula    General Appearance: NAD, mild SOB  HEENT: Normocephalic, atraumatic, trachea midline  Heart: s1, s2,  tachycardia  Lungs: increased work of breathing, on 2L NC. Symmetric chest wall rise and fall. Bilateral breath sounds  Abdomen:  Soft, nontender, nondistended. Obese  MSK/Extremities: Warm & well-perfused.   Skin: Warm, dry. No jaundice.   Vascular: warm, with pitting edema of the bilateral lower extremities, RLEx with erythema to the anterior leg. comparments soft.   Neurologic: Non-focal  Psychiatry: A & O x 3, Mood & affect appropriate      MEDICATIONS:   MEDICATIONS  (STANDING):  amLODIPine   Tablet 2.5 milliGRAM(s) Oral daily  atorvastatin 20 milliGRAM(s) Oral at bedtime  benzonatate 100 milliGRAM(s) Oral every 8 hours  budesonide 160 MICROgram(s)/formoterol 4.5 MICROgram(s) Inhaler 2 Puff(s) Inhalation two times a day  furosemide    Tablet 20 milliGRAM(s) Oral daily  gabapentin 300 milliGRAM(s) Oral three times a day  levothyroxine 25 MICROGram(s) Oral daily  lidocaine   4% Patch 1 Patch Transdermal daily  magnesium sulfate  IVPB 2 Gram(s) IV Intermittent once  pantoprazole    Tablet 40 milliGRAM(s) Oral before breakfast  polyethylene glycol 3350 17 Gram(s) Oral daily  potassium chloride   Powder 40 milliEquivalent(s) Oral once  senna 2 Tablet(s) Oral at bedtime  tiotropium 2.5 MICROgram(s) Inhaler 2 Puff(s) Inhalation daily    MEDICATIONS  (PRN):  acetaminophen     Tablet .. 650 milliGRAM(s) Oral every 6 hours PRN Temp greater or equal to 38C (100.4F), Mild Pain (1 - 3)  albuterol    90 MICROgram(s) HFA Inhaler 2 Puff(s) Inhalation every 6 hours PRN Bronchospasm  HYDROmorphone   Tablet 4 milliGRAM(s) Oral every 4 hours PRN Severe Pain (7 - 10)  hydrOXYzine hydrochloride 25 milliGRAM(s) Oral at bedtime PRN Restlessness  sodium chloride 0.65% Nasal 1 Spray(s) Both Nostrils four times a day PRN Nasal Congestion    LAB/STUDIES:                    11.9   10.08 )-----------( 243      ( 24 Jan 2023 13:30 )             36.0     01-24  135  |  92<L>  |  10  ----------------------------<  141<H>  3.4<L>   |  30  |  0.8    Ca    8.8      24 Jan 2023 13:30  Phos  2.8     01-24  Mg     1.7     01-24    TPro  5.9<L>  /  Alb  3.5  /  TBili  1.3<H>  /  DBili  x   /  AST  19  /  ALT  10  /  AlkPhos  135<H>  01-24    LIVER FUNCTIONS - ( 24 Jan 2023 13:30 )  Alb: 3.5 g/dL / Pro: 5.9 g/dL / ALK PHOS: 135 U/L / ALT: 10 U/L / AST: 19 U/L / GGT: x           CARDIAC MARKERS ( 24 Jan 2023 13:30 )  x     / <0.01 ng/mL / x     / x     / x          IMAGING:  < from: [facility] Duplex Lower Ext Vein Scan, Bilat (01.24.23 @ 12:02) >  FINDINGS:  RIGHT:  DVT is visualized in common femoral, femoral, popliteal veins.  Calf veins were not visualized due to swelling.    LEFT:  Normal compressibility of the LEFT common femoral, femoral veins.  Doppler examination shows normal spontaneous and phasic flow.  DVT is visualized in left popliteal vein  Calf veins were not visualized due to swelling.    IMPRESSION:  DVT is visualized in the right common femoral, femoral, popliteal veins.  DVT is visualized in left popliteal vein.  Bilateral calf veins were not visualized due to swelling.    --- End of Report ---  THIAGO TRUJILLO MD; Attending Vascular Surgeon  This document has been electronically signed. Jan 24 2023  2:28PM  < end of copied text >

## 2023-01-24 NOTE — CONSULT NOTE ADULT - SUBJECTIVE AND OBJECTIVE BOX
NEUROSURGERY CONSULT  WILFREDO CHO   01-24-23 @ 11:30    HPI: 82 year old female with past medical history of DLD, ILD on 2-3L home O2, compression fx thoracic spine, IVC filter, and hypothyroidism presents after recent discharge for shortness of breath and back pain. Patient lives at home alone, and is unable to care for herself. She had a visiting nurse service once since discharge who agreed the patient is unsafe to be living at home. Patient was found to be covid positive upon admission. She states that she has a slight sore throat and had one loose BM today, but does not feel any more short of breath than she she has been since last admission. Patient also states that over the last month, she has been having swelling of both lower legs. She denies chest pain/nausea/vomiting/ abdominal pain. Her back pain has been controlled on 20mg oxycodone.     Neurosurgery consulted for a patient transferred here from [facility] for back pain, now planning for T5, T9, & T12 Kyphoplasty 1/25 with Dr. Hwang.   States that she has worsening back pain, but otherwise has residual L sided slight weakness & numbness from residual brain aneurysm in 2010 treated by coiling by Dr. Helm.     RADIOLOGY:   < from: MR Thoracic Spine No Cont (12.30.22 @ 17:34) >  IMPRESSION:    Redemonstrated multilevel compression deformities ofthe thoracic spine:  -Mild compression deformity of T4, likely chronic.  -Moderate compression deformity of T5 with trace retropulsion of the   vertebral cortex, likely subacute.  -Mild compression deformity of T9, likely subacute.  -Mild-moderate compression deformity of T11, likely chronic.  -Severe compression deformity of T12 with mild retropulsion of the   posterior cortex, likely acute on chronic. This results in mild   flattening of the ventral cord.    Well-defined focal cord signal abnormality at the level of T5 could   reflect myelomalacia versus focal cord edema possibly associated with   trace retropulsion of T5. Intramedullary mass is less likely but cannot   be completely excluded.    Multilevel thoracic spondylosis as above, more notable at T7-8 with   mild-moderate spinal stenosis with focal indentation in the right ventral   cord secondary to T7 right paracentral focal bony spurring versus   calcified superiorly extending T7-8 disc extrusion. No cord edema.    T12-L1 mildspinal stenosis associated with retropulsion of T12 posterior   cortex, and severe left/moderate right foraminal stenosis.    PAST MEDICAL & SURGICAL HISTORY:  Hypothyroid  Hyperlipemia  Brain aneurysm  Compression fracture of spine  Presence of IVC filter  S/P appendectomy  S/P coil embolization of cerebral aneurysm    FAMILY HISTORY:      SOCIAL HISTORY:  Tobacco Use:  EtOH use:   Substance:    Allergies    Compazine (Unknown)    Intolerances        [X] A ten-point review of systems is negative except as noted   [  ] Due to altered mental status/intubation, subjective information were not able to be obtained from the patient. History was obtained, to the extent possible, from review of the chart and collateral sources of information    MEDS:  acetaminophen     Tablet .. 650 milliGRAM(s) Oral every 6 hours PRN  albuterol    90 MICROgram(s) HFA Inhaler 2 Puff(s) Inhalation every 6 hours PRN  amLODIPine   Tablet 2.5 milliGRAM(s) Oral daily  atorvastatin 20 milliGRAM(s) Oral at bedtime  benzonatate 100 milliGRAM(s) Oral every 8 hours  budesonide 160 MICROgram(s)/formoterol 4.5 MICROgram(s) Inhaler 2 Puff(s) Inhalation two times a day  furosemide    Tablet 20 milliGRAM(s) Oral daily  gabapentin 300 milliGRAM(s) Oral three times a day  heparin   Injectable 5000 Unit(s) SubCutaneous every 12 hours  HYDROmorphone   Tablet 4 milliGRAM(s) Oral every 4 hours PRN  hydrOXYzine hydrochloride 25 milliGRAM(s) Oral at bedtime PRN  levothyroxine 25 MICROGram(s) Oral daily  lidocaine   4% Patch 1 Patch Transdermal daily  pantoprazole    Tablet 40 milliGRAM(s) Oral before breakfast  polyethylene glycol 3350 17 Gram(s) Oral daily  remdesivir  IVPB   IV Intermittent   remdesivir  IVPB 100 milliGRAM(s) IV Intermittent every 24 hours  senna 2 Tablet(s) Oral at bedtime  tiotropium 2.5 MICROgram(s) Inhaler 2 Puff(s) Inhalation daily      PHYSICAL EXAM:  GENERAL: NAD, speaks in full sentences, no signs of respiratory distress  HEAD:  Atraumatic, Normocephalic  NECK: Supple, No JVD  CHEST/LUNG: Clear to auscultation bilaterally; No wheeze; No crackles; No accessory muscles used  HEART: Regular rate and rhythm;   ABDOMEN: Soft, Nontender, Nondistended; Bowel sounds present; No guarding  EXTREMITIES:  2+ Peripheral Pulses, No cyanosis or edema  MSK: Thoracic & lumbar tenderness to palpation    NEUROLOGICAL EXAM   AOx3, appropriate, follows commands  PERRL, EOMI  SEGURA x 4  B/L UE 5/5   LLE 5-/5, RLE 5/5  Sl dec sensation to LLE > RLE   No protonator drift   Sustained B/L clonus       Vital Signs Last 24 Hrs  T(C): 36.1 (24 Jan 2023 08:48), Max: 36.7 (23 Jan 2023 22:16)  T(F): 96.9 (24 Jan 2023 08:48), Max: 98.1 (23 Jan 2023 22:16)  HR: 110 (24 Jan 2023 08:48) (102 - 111)  BP: 122/84 (24 Jan 2023 08:48) (110/67 - 150/64)  BP(mean): --  RR: 18 (24 Jan 2023 08:48) (16 - 18)  SpO2: 96% (24 Jan 2023 08:48) (95% - 97%)    Parameters below as of 24 Jan 2023 08:48  Patient On (Oxygen Delivery Method): nasal cannula          LABS:                     NEUROSURGERY CONSULT  WILFREDO CHO   01-24-23 @ 11:30    HPI: 82 year old female with past medical history of DLD, ILD on 2-3L home O2, compression fx thoracic spine, IVC filter, and hypothyroidism presents after recent discharge for shortness of breath and back pain. Patient lives at home alone, and is unable to care for herself. She had a visiting nurse service once since discharge who agreed the patient is unsafe to be living at home. Patient was found to be covid positive upon admission. She states that she has a slight sore throat and had one loose BM today, but does not feel any more short of breath than she she has been since last admission. Patient also states that over the last month, she has been having swelling of both lower legs. She denies chest pain/nausea/vomiting/ abdominal pain. Her back pain has been controlled on 20mg oxycodone.     Neurosurgery consulted for a patient transferred here from [facility] for back pain, now planning for T5, T9, & T12 Kyphoplasty 1/25 with Dr. Hwang.   States that she has worsening back pain, but otherwise has residual L sided slight weakness & numbness from residual brain aneurysm in 2010 treated by coiling by Dr. Helm.     RADIOLOGY:   < from: MR Thoracic Spine No Cont (12.30.22 @ 17:34) >  IMPRESSION:    Redemonstrated multilevel compression deformities ofthe thoracic spine:  -Mild compression deformity of T4, likely chronic.  -Moderate compression deformity of T5 with trace retropulsion of the   vertebral cortex, likely subacute.  -Mild compression deformity of T9, likely subacute.  -Mild-moderate compression deformity of T11, likely chronic.  -Severe compression deformity of T12 with mild retropulsion of the   posterior cortex, likely acute on chronic. This results in mild   flattening of the ventral cord.    Well-defined focal cord signal abnormality at the level of T5 could   reflect myelomalacia versus focal cord edema possibly associated with   trace retropulsion of T5. Intramedullary mass is less likely but cannot   be completely excluded.    Multilevel thoracic spondylosis as above, more notable at T7-8 with   mild-moderate spinal stenosis with focal indentation in the right ventral   cord secondary to T7 right paracentral focal bony spurring versus   calcified superiorly extending T7-8 disc extrusion. No cord edema.    T12-L1 mildspinal stenosis associated with retropulsion of T12 posterior   cortex, and severe left/moderate right foraminal stenosis.    PAST MEDICAL & SURGICAL HISTORY:  Hypothyroid  Hyperlipemia  Brain aneurysm  Compression fracture of spine  Presence of IVC filter  S/P appendectomy  S/P coil embolization of cerebral aneurysm    FAMILY HISTORY:      SOCIAL HISTORY:  Tobacco Use:  EtOH use:   Substance:    Allergies    Compazine (Unknown)    Intolerances        [X] A ten-point review of systems is negative except as noted   [  ] Due to altered mental status/intubation, subjective information were not able to be obtained from the patient. History was obtained, to the extent possible, from review of the chart and collateral sources of information    MEDS:  acetaminophen     Tablet .. 650 milliGRAM(s) Oral every 6 hours PRN  albuterol    90 MICROgram(s) HFA Inhaler 2 Puff(s) Inhalation every 6 hours PRN  amLODIPine   Tablet 2.5 milliGRAM(s) Oral daily  atorvastatin 20 milliGRAM(s) Oral at bedtime  benzonatate 100 milliGRAM(s) Oral every 8 hours  budesonide 160 MICROgram(s)/formoterol 4.5 MICROgram(s) Inhaler 2 Puff(s) Inhalation two times a day  furosemide    Tablet 20 milliGRAM(s) Oral daily  gabapentin 300 milliGRAM(s) Oral three times a day  heparin   Injectable 5000 Unit(s) SubCutaneous every 12 hours  HYDROmorphone   Tablet 4 milliGRAM(s) Oral every 4 hours PRN  hydrOXYzine hydrochloride 25 milliGRAM(s) Oral at bedtime PRN  levothyroxine 25 MICROGram(s) Oral daily  lidocaine   4% Patch 1 Patch Transdermal daily  pantoprazole    Tablet 40 milliGRAM(s) Oral before breakfast  polyethylene glycol 3350 17 Gram(s) Oral daily  remdesivir  IVPB   IV Intermittent   remdesivir  IVPB 100 milliGRAM(s) IV Intermittent every 24 hours  senna 2 Tablet(s) Oral at bedtime  tiotropium 2.5 MICROgram(s) Inhaler 2 Puff(s) Inhalation daily      PHYSICAL EXAM:  GENERAL: NAD, speaks in full sentences, no signs of respiratory distress  HEAD:  Atraumatic, Normocephalic  NECK: Supple, No JVD  CHEST/LUNG: Clear to auscultation bilaterally; No wheeze; No crackles; No accessory muscles used  HEART: Regular rate and rhythm;   ABDOMEN: Soft, Nontender, Nondistended; Bowel sounds present; No guarding  EXTREMITIES:  2+ Peripheral Pulses, No cyanosis or edema  MSK: Thoracic & lumbar tenderness to palpation    NEUROLOGICAL EXAM   AOx3, appropriate, follows commands  PERRL, EOMI  SEGURA x 4  B/L UE 5/5   LLE 5-/5, RLE 5/5  Sl dec sensation to LLE > RLE   No protonator drift   Sustained B/L clonus, L>R  Positive mid back pain to palpation       Vital Signs Last 24 Hrs  T(C): 36.1 (24 Jan 2023 08:48), Max: 36.7 (23 Jan 2023 22:16)  T(F): 96.9 (24 Jan 2023 08:48), Max: 98.1 (23 Jan 2023 22:16)  HR: 110 (24 Jan 2023 08:48) (102 - 111)  BP: 122/84 (24 Jan 2023 08:48) (110/67 - 150/64)  BP(mean): --  RR: 18 (24 Jan 2023 08:48) (16 - 18)  SpO2: 96% (24 Jan 2023 08:48) (95% - 97%)    Parameters below as of 24 Jan 2023 08:48  Patient On (Oxygen Delivery Method): nasal cannula          LABS:

## 2023-01-24 NOTE — PROGRESS NOTE ADULT - ASSESSMENT
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  Found in the hospital to have COVID.  Transferred here from [facility] for neurosurgery regarding compression fracture, found here to have extensive DVT.  Patient has an IVC filter, following up neuroendovascular surgery on whether patient can have AC.     # COVID19  Left midlung bandlike opacity. Lung volumes. No focal consolidation otherwise. No pneumothorax or pleural effusion.  2-3L home O2, back on baseline requirement  s/p RDV, she was given one dose of decadron at the time, was not continued    # Recurrent DVT  - team called by vascular for extensive DVTs in right leg, f/u official read  - has an IVC filter, couldn't be on AC in the past because of aneurysm, neuroendovascular will f/u on whether she can have AC  - getting CTA as patient had been tachycardic and short of breath    # Severe Thoracic compression fracture T12 complicated by worsening severe back pain, limited ROM, difficulty with ambulation due to pain 2/2 severe compression fx  MRI T-spine: multilevel compression deformities of the thoracic spine  Neurosurgery was planning on intervention, however DVT was found  - Pain management eval appreciated: dilaudid PRN, gabapentin, toradol PRN for breakthrough pain  - TLSO brace and PT eval  - CM/social work    Obesity BMI 35  - wt loss per PCP    Hepatic cyst  - f/u OP     Hx/o cerebral aneurysm s/p coiling   - no new deficits.     Hypothyroidism  - cont synthroid    h/o HTN   - c/w amlodipine     h/o DLD  - c/w statin     Diet - DASH   DVT ppx: heparin for now    Dispo: acute, f/u neuroendovascular on AC, f/u vascular, kyphoplasty will be held until acute issues are managed

## 2023-01-24 NOTE — CONSULT NOTE ADULT - NS ATTEND AMEND GEN_ALL_CORE FT
Spoke with Dr. Malhotra and Dr. Ramirez and will continue to follow this patient
Ms. Chase is a very pleasant 82-year-old woman with multiple thoracic compression fractures who returned to [facility] secondary to concerns for safety at home while on newly prescribed home oxygen and multiple cats.  She did not have any new changes in neurologic function or any new increased thoracic back pain since her prior hospitalization and was sent back to the hospital for concerns for safety rather than new neurologic deficits.  While stent on [facility] she was medically cleared to undergo thoracic kyphoplasty and was transferred to [facility].  Upon my exam today, she has bilateral lower extremity swelling, and persistent oxygen requirement.  She remained at her neurologic baseline and had tenderness to palpation over the upper thoracic spine.    Recommended bilateral lower extremity Dopplers which were positive for active DVT even in the setting of an IVC filter.  CTA of the chest was obtained to the primary service and was positive for PE.  The medicine service is conducting additional work-up and treatment and she is not cleared medically to proceed with me to surgery tomorrow.  We will tentatively reschedule the surgery for next Wednesday, February 1 if she is medically cleared at that time.      Thank you for allowing us to participate in the care of this patient.  We will be available if needed.  Please call with questions.

## 2023-01-24 NOTE — CONSULT NOTE ADULT - ASSESSMENT
82F DLD, ILD on 2-3L home O2, compression fx, IVCF, hypothyroidism, Brain aneurysm coiling 2010 presenting with worsening back pain     PLAN   - OR 1/25/2023 with Dr. Hwang for T5, T9, and T12 Kyphoplasty   - Medical optimization per hospitalist   - Cardiology & pulm clearance   - LE Dopplers ordered, noted to have mild swelling in LE   - Preop Labs tonight  - NPO after midnight with IVF   - Check BMP   - Pain management consult   - D/W attending  82F DLD, ILD on 2-3L home O2, compression fx, IVCF, hypothyroidism, Brain aneurysm coiling 2010 presenting with worsening back pain     PLAN   - OR 1/25/2023 with Dr. Hwang for T5, T9, and T12 Kyphoplasty cancelled 2/2 to not cleared by medicine  - Medical optimization per hospitalist   - Cardiology & pulm clearance   - LE Dopplers ordered, noted to have mild swelling in LE   - Preop Labs tonight  - NPO after midnight with IVF   - Check BMP   - Pain management consult   - D/W attending

## 2023-01-25 LAB
ALBUMIN SERPL ELPH-MCNC: 3.1 G/DL — LOW (ref 3.5–5.2)
ALP SERPL-CCNC: 112 U/L — SIGNIFICANT CHANGE UP (ref 30–115)
ALT FLD-CCNC: 10 U/L — SIGNIFICANT CHANGE UP (ref 0–41)
ANION GAP SERPL CALC-SCNC: 10 MMOL/L — SIGNIFICANT CHANGE UP (ref 7–14)
AST SERPL-CCNC: 15 U/L — SIGNIFICANT CHANGE UP (ref 0–41)
BASOPHILS # BLD AUTO: 0.06 K/UL — SIGNIFICANT CHANGE UP (ref 0–0.2)
BASOPHILS NFR BLD AUTO: 0.8 % — SIGNIFICANT CHANGE UP (ref 0–1)
BILIRUB SERPL-MCNC: 0.9 MG/DL — SIGNIFICANT CHANGE UP (ref 0.2–1.2)
BUN SERPL-MCNC: 9 MG/DL — LOW (ref 10–20)
CALCIUM SERPL-MCNC: 8.7 MG/DL — SIGNIFICANT CHANGE UP (ref 8.4–10.5)
CHLORIDE SERPL-SCNC: 95 MMOL/L — LOW (ref 98–110)
CO2 SERPL-SCNC: 34 MMOL/L — HIGH (ref 17–32)
CREAT SERPL-MCNC: 0.8 MG/DL — SIGNIFICANT CHANGE UP (ref 0.7–1.5)
EGFR: 74 ML/MIN/1.73M2 — SIGNIFICANT CHANGE UP
EOSINOPHIL # BLD AUTO: 0.26 K/UL — SIGNIFICANT CHANGE UP (ref 0–0.7)
EOSINOPHIL NFR BLD AUTO: 3.6 % — SIGNIFICANT CHANGE UP (ref 0–8)
GLUCOSE SERPL-MCNC: 101 MG/DL — HIGH (ref 70–99)
HCT VFR BLD CALC: 32.1 % — LOW (ref 37–47)
HGB BLD-MCNC: 10.2 G/DL — LOW (ref 12–16)
IMM GRANULOCYTES NFR BLD AUTO: 0.4 % — HIGH (ref 0.1–0.3)
INR BLD: 1.17 RATIO — SIGNIFICANT CHANGE UP (ref 0.65–1.3)
LYMPHOCYTES # BLD AUTO: 1.76 K/UL — SIGNIFICANT CHANGE UP (ref 1.2–3.4)
LYMPHOCYTES # BLD AUTO: 24.1 % — SIGNIFICANT CHANGE UP (ref 20.5–51.1)
MAGNESIUM SERPL-MCNC: 2.2 MG/DL — SIGNIFICANT CHANGE UP (ref 1.8–2.4)
MCHC RBC-ENTMCNC: 30.4 PG — SIGNIFICANT CHANGE UP (ref 27–31)
MCHC RBC-ENTMCNC: 31.8 G/DL — LOW (ref 32–37)
MCV RBC AUTO: 95.5 FL — SIGNIFICANT CHANGE UP (ref 81–99)
MONOCYTES # BLD AUTO: 1.24 K/UL — HIGH (ref 0.1–0.6)
MONOCYTES NFR BLD AUTO: 17 % — HIGH (ref 1.7–9.3)
NEUTROPHILS # BLD AUTO: 3.96 K/UL — SIGNIFICANT CHANGE UP (ref 1.4–6.5)
NEUTROPHILS NFR BLD AUTO: 54.1 % — SIGNIFICANT CHANGE UP (ref 42.2–75.2)
NRBC # BLD: 0 /100 WBCS — SIGNIFICANT CHANGE UP (ref 0–0)
PLATELET # BLD AUTO: 240 K/UL — SIGNIFICANT CHANGE UP (ref 130–400)
POTASSIUM SERPL-MCNC: 3.4 MMOL/L — LOW (ref 3.5–5)
POTASSIUM SERPL-SCNC: 3.4 MMOL/L — LOW (ref 3.5–5)
PROT SERPL-MCNC: 5.2 G/DL — LOW (ref 6–8)
PROTHROM AB SERPL-ACNC: 13.4 SEC — HIGH (ref 9.95–12.87)
RBC # BLD: 3.36 M/UL — LOW (ref 4.2–5.4)
RBC # FLD: 13.9 % — SIGNIFICANT CHANGE UP (ref 11.5–14.5)
SODIUM SERPL-SCNC: 139 MMOL/L — SIGNIFICANT CHANGE UP (ref 135–146)
WBC # BLD: 7.31 K/UL — SIGNIFICANT CHANGE UP (ref 4.8–10.8)
WBC # FLD AUTO: 7.31 K/UL — SIGNIFICANT CHANGE UP (ref 4.8–10.8)

## 2023-01-25 PROCEDURE — 99233 SBSQ HOSP IP/OBS HIGH 50: CPT

## 2023-01-25 RX ORDER — HYDROMORPHONE HYDROCHLORIDE 2 MG/ML
4 INJECTION INTRAMUSCULAR; INTRAVENOUS; SUBCUTANEOUS EVERY 4 HOURS
Refills: 0 | Status: DISCONTINUED | OUTPATIENT
Start: 2023-01-25 | End: 2023-01-28

## 2023-01-25 RX ORDER — POTASSIUM CHLORIDE 20 MEQ
40 PACKET (EA) ORAL ONCE
Refills: 0 | Status: COMPLETED | OUTPATIENT
Start: 2023-01-25 | End: 2023-01-25

## 2023-01-25 RX ADMIN — HYDROMORPHONE HYDROCHLORIDE 4 MILLIGRAM(S): 2 INJECTION INTRAMUSCULAR; INTRAVENOUS; SUBCUTANEOUS at 01:23

## 2023-01-25 RX ADMIN — SENNA PLUS 2 TABLET(S): 8.6 TABLET ORAL at 21:00

## 2023-01-25 RX ADMIN — HYDROMORPHONE HYDROCHLORIDE 4 MILLIGRAM(S): 2 INJECTION INTRAMUSCULAR; INTRAVENOUS; SUBCUTANEOUS at 21:24

## 2023-01-25 RX ADMIN — Medication 20 MILLIGRAM(S): at 05:10

## 2023-01-25 RX ADMIN — LIDOCAINE 1 PATCH: 4 CREAM TOPICAL at 12:11

## 2023-01-25 RX ADMIN — HYDROMORPHONE HYDROCHLORIDE 4 MILLIGRAM(S): 2 INJECTION INTRAMUSCULAR; INTRAVENOUS; SUBCUTANEOUS at 12:12

## 2023-01-25 RX ADMIN — ENOXAPARIN SODIUM 80 MILLIGRAM(S): 100 INJECTION SUBCUTANEOUS at 05:12

## 2023-01-25 RX ADMIN — POLYETHYLENE GLYCOL 3350 17 GRAM(S): 17 POWDER, FOR SOLUTION ORAL at 12:12

## 2023-01-25 RX ADMIN — Medication 650 MILLIGRAM(S): at 08:39

## 2023-01-25 RX ADMIN — AMLODIPINE BESYLATE 2.5 MILLIGRAM(S): 2.5 TABLET ORAL at 05:11

## 2023-01-25 RX ADMIN — GABAPENTIN 300 MILLIGRAM(S): 400 CAPSULE ORAL at 21:01

## 2023-01-25 RX ADMIN — GABAPENTIN 300 MILLIGRAM(S): 400 CAPSULE ORAL at 14:48

## 2023-01-25 RX ADMIN — HYDROMORPHONE HYDROCHLORIDE 4 MILLIGRAM(S): 2 INJECTION INTRAMUSCULAR; INTRAVENOUS; SUBCUTANEOUS at 08:40

## 2023-01-25 RX ADMIN — Medication 40 MILLIEQUIVALENT(S): at 12:12

## 2023-01-25 RX ADMIN — ATORVASTATIN CALCIUM 20 MILLIGRAM(S): 80 TABLET, FILM COATED ORAL at 21:01

## 2023-01-25 RX ADMIN — HYDROMORPHONE HYDROCHLORIDE 4 MILLIGRAM(S): 2 INJECTION INTRAMUSCULAR; INTRAVENOUS; SUBCUTANEOUS at 07:44

## 2023-01-25 RX ADMIN — Medication 25 MICROGRAM(S): at 05:11

## 2023-01-25 RX ADMIN — ENOXAPARIN SODIUM 80 MILLIGRAM(S): 100 INJECTION SUBCUTANEOUS at 17:18

## 2023-01-25 RX ADMIN — HYDROMORPHONE HYDROCHLORIDE 4 MILLIGRAM(S): 2 INJECTION INTRAMUSCULAR; INTRAVENOUS; SUBCUTANEOUS at 12:45

## 2023-01-25 RX ADMIN — HYDROMORPHONE HYDROCHLORIDE 4 MILLIGRAM(S): 2 INJECTION INTRAMUSCULAR; INTRAVENOUS; SUBCUTANEOUS at 20:54

## 2023-01-25 RX ADMIN — GABAPENTIN 300 MILLIGRAM(S): 400 CAPSULE ORAL at 05:10

## 2023-01-25 RX ADMIN — PANTOPRAZOLE SODIUM 40 MILLIGRAM(S): 20 TABLET, DELAYED RELEASE ORAL at 06:22

## 2023-01-25 RX ADMIN — HYDROMORPHONE HYDROCHLORIDE 4 MILLIGRAM(S): 2 INJECTION INTRAMUSCULAR; INTRAVENOUS; SUBCUTANEOUS at 00:53

## 2023-01-25 NOTE — CONSULT NOTE ADULT - ASSESSMENT
IMPRESSION: Rehab of T12 spine comp fx / covid-19+ / DVT /  HLD, hypothyroidism, brain aneurysm     PRECAUTIONS: [   ] Cardiac  [ x  ] Respiratory  [   ] Seizures [ x  ] Contact Isolation  [  x ] Droplet Isolation  [   ] Other    Weight Bearing Status:     RECOMMENDATION: adequate pain control / f/u neurosurgery     Out of Bed to Chair     DVT/Decubiti Prophylaxis    REHAB PLAN:     [   x ] Bedside P/T 3-5 times a week   [  x  ]   Bedside O/T  2-3 times a week             [    ] Speech Therapy               [    ]  No Rehab Therapy Indicated   Conditioning/ROM                                    ADL  Bed Mobility                                               Conditioning/ROM  Transfers                                                     Bed Mobility  Sitting /Standing Balance                         Transfers                                        Gait Training                                               Sitting/Standing Balance  Stair Training [   ]Applicable                    Home equipment Eval                                                                        Splinting  [   ] Only      GOALS:   ADL   [ x   ]   Independent                    Transfers  [ x   ] Independent                          Ambulation  [  x  ] Independent     [  x   ] With device                            [    ]  CG                                                         [    ]  CG                                                                  [    ] CG                            [    ] Min A                                                   [    ] Min A                                                              [    ] Min  A          DISCHARGE PLAN:   [    ]  Good candidate for Intensive Rehabilitation/Hospital based                                             Will tolerate 3hrs Intensive Rehab Daily                                       [  x   ]  Short Term Rehab in Skilled Nursing Facility                                       [  x   ]  Home with Outpatient or VN services  vs                                       [     ]  Possible Candidate for Intensive Hospital based Rehab

## 2023-01-25 NOTE — PROGRESS NOTE ADULT - SUBJECTIVE AND OBJECTIVE BOX
WILFREDO CHO  [facility] [address] [address] ([facility] M[facility])      Patient is a 82y old  Female who presents with a chief complaint of back pain (22 Jan 2023 09:15)        Interval events:  Patient seen and examined at bedside.     -PMHx: Hypothyroid    Hyperlipemia    Brain aneurysm    Compression fracture of spine    Presence of IVC filter      -PSHx:S/P appendectomy    S/P coil embolization of cerebral aneurysm            REVIEW OF SYSTEMS:  CONSTITUTIONAL: No fever, weight loss, or fatigue  RESPIRATORY: No cough, wheezing, chills or hemoptysis; No shortness of breath  CARDIOVASCULAR: No chest pain, palpitations, dizziness, or leg swelling  GASTROINTESTINAL: No abdominal or epigastric pain. No nausea, vomiting, or hematemesis; No diarrhea or constipation. No melena or hematochezia.  NEUROLOGICAL: No headaches  LYMPH NODES: No enlarged glands  MUSCULOSKELETAL: No joint pain or swelling; No muscle, back, or extremity pain      T(C): , Max: 36.9 (01-24-23 @ 16:52)  HR: 91 (01-25-23 @ 08:48)  BP: 103/58 (01-25-23 @ 08:48)  RR: 18 (01-25-23 @ 08:48)  SpO2: 92% (01-25-23 @ 08:48)  CAPILLARY BLOOD GLUCOSE          PHYSICAL EXAM:  #physical      LABS:          10.2  7.31  )-------(240          32.1  N=54.1  L=24.1  MCV=95.5    139|95<L>|9<L>  ------------------<101<H>  3.4<L>|34<H>|0.8  eGFR:--  Ca:8.7      PT/INR - ( 25 Jan 2023 07:07 )   PT: 13.40 sec;   INR: 1.17 ratio         PTT - ( 24 Jan 2023 15:17 )  PTT:30.5 sec      Microbiology:      RADIOLOGY & ADDITIONAL TESTS:        Medications:  acetaminophen     Tablet .. 650 milliGRAM(s) Oral every 6 hours PRN  albuterol    90 MICROgram(s) HFA Inhaler 2 Puff(s) Inhalation every 6 hours PRN  amLODIPine   Tablet 2.5 milliGRAM(s) Oral daily  atorvastatin 20 milliGRAM(s) Oral at bedtime  benzonatate 100 milliGRAM(s) Oral every 8 hours  budesonide 160 MICROgram(s)/formoterol 4.5 MICROgram(s) Inhaler 2 Puff(s) Inhalation two times a day  enoxaparin Injectable 80 milliGRAM(s) SubCutaneous every 12 hours  furosemide    Tablet 20 milliGRAM(s) Oral daily  gabapentin 300 milliGRAM(s) Oral three times a day  HYDROmorphone   Tablet 4 milliGRAM(s) Oral every 4 hours PRN  levothyroxine 25 MICROGram(s) Oral daily  lidocaine   4% Patch 1 Patch Transdermal daily  pantoprazole    Tablet 40 milliGRAM(s) Oral before breakfast  polyethylene glycol 3350 17 Gram(s) Oral daily  senna 2 Tablet(s) Oral at bedtime  sodium chloride 0.65% Nasal 1 Spray(s) Both Nostrils four times a day PRN  tiotropium 2.5 MICROgram(s) Inhaler 2 Puff(s) Inhalation daily         WILFREDO CHO  [facility] [address] [address] ([facility])      Patient is a 82y old  Female who presents with a chief complaint of back pain (22 Jan 2023 09:15)        Interval events:  Patient seen and examined at bedside. No acute events overnight. Says breathing is stable, still on oxygen.     -PMHx: Hypothyroid    Hyperlipemia    Brain aneurysm    Compression fracture of spine    Presence of IVC filter      -PSHx:S/P appendectomy    S/P coil embolization of cerebral aneurysm            REVIEW OF SYSTEMS:  CONSTITUTIONAL: No fever, weight loss, or fatigue  RESPIRATORY: as above   CARDIOVASCULAR: No chest pain, palpitations, dizziness, or leg swelling  GASTROINTESTINAL: No abdominal or epigastric pain. No nausea, vomiting, or hematemesis; No diarrhea or constipation. No melena or hematochezia.  NEUROLOGICAL: No headaches  LYMPH NODES: No enlarged glands  MUSCULOSKELETAL: No joint pain or swelling; No muscle, back, or extremity pain      T(C): , Max: 36.9 (01-24-23 @ 16:52)  HR: 91 (01-25-23 @ 08:48)  BP: 103/58 (01-25-23 @ 08:48)  RR: 18 (01-25-23 @ 08:48)  SpO2: 92% (01-25-23 @ 08:48)  CAPILLARY BLOOD GLUCOSE        PHYSICAL EXAM:  GENERAL: NAD, obese, lying in bed comfortably, on 2L NC  HEAD:  Atraumatic, Normocephalic  EYES: EOMI, PERRLA, conjunctiva and sclera clear  ENT: Moist mucous membranes  NECK: Supple, No JVD     CHEST/LUNG: rhonchi b/l   HEART: Regular rate and rhythm; No murmurs, rubs, or gallops  ABDOMEN: Bowel sounds present; Soft, Nontender, Nondistended. No hepatomegaly  EXTREMITIES:  2+ Peripheral Pulses, brisk capillary refill. 2+ b/l pitting edema LE's   NERVOUS SYSTEM:  Alert & Oriented X3, speech clear. No deficits   MSK: FROM all 4 extremities, full and equal strength  SKIN: No rashes or lesions    Spoke with consultants: Y[x ] N [ ]       LABS:          10.2  7.31  )-------(240          32.1  N=54.1  L=24.1  MCV=95.5    139|95<L>|9<L>  ------------------<101<H>  3.4<L>|34<H>|0.8  eGFR:--  Ca:8.7      PT/INR - ( 25 Jan 2023 07:07 )   PT: 13.40 sec;   INR: 1.17 ratio         PTT - ( 24 Jan 2023 15:17 )  PTT:30.5 sec      Microbiology:      RADIOLOGY & ADDITIONAL TESTS:  < from: CT Angio Chest PE Protocol w/ IV Cont (01.24.23 @ 16:02) >  IMPRESSION:    Since  12/26/2022      Interval development, occlusive emboli within the proximal right lower   lobe pulmonary artery (4/). No right heart strain.    < end of copied text >        Medications:  acetaminophen     Tablet .. 650 milliGRAM(s) Oral every 6 hours PRN  albuterol    90 MICROgram(s) HFA Inhaler 2 Puff(s) Inhalation every 6 hours PRN  amLODIPine   Tablet 2.5 milliGRAM(s) Oral daily  atorvastatin 20 milliGRAM(s) Oral at bedtime  benzonatate 100 milliGRAM(s) Oral every 8 hours  budesonide 160 MICROgram(s)/formoterol 4.5 MICROgram(s) Inhaler 2 Puff(s) Inhalation two times a day  enoxaparin Injectable 80 milliGRAM(s) SubCutaneous every 12 hours  furosemide    Tablet 20 milliGRAM(s) Oral daily  gabapentin 300 milliGRAM(s) Oral three times a day  HYDROmorphone   Tablet 4 milliGRAM(s) Oral every 4 hours PRN  levothyroxine 25 MICROGram(s) Oral daily  lidocaine   4% Patch 1 Patch Transdermal daily  pantoprazole    Tablet 40 milliGRAM(s) Oral before breakfast  polyethylene glycol 3350 17 Gram(s) Oral daily  senna 2 Tablet(s) Oral at bedtime  sodium chloride 0.65% Nasal 1 Spray(s) Both Nostrils four times a day PRN  tiotropium 2.5 MICROgram(s) Inhaler 2 Puff(s) Inhalation daily

## 2023-01-25 NOTE — PROGRESS NOTE ADULT - ASSESSMENT
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  Found in the hospital to have COVID.  Transferred here from [facility] for neurosurgery regarding compression fracture, found here to have extensive DVT.  Patient has an IVC filter, following up neuroendovascular surgery on whether patient can have AC.     # COVID19- stable- at 2-3L which is home O2  # ILD  Left midlung bandlike opacity. Lung volumes. No focal consolidation otherwise. No pneumothorax or pleural effusion.  2-3L home O2, back on baseline requirement  s/p RDV, she was given one dose of decadron at the time, was not continued    # Recurrent DVT  # New PE- stable- No right heart strain  - team called by vascular for extensive DVTs in right leg  - has an IVC filter, couldn't be on AC in the past because of aneurysm, neuroendovascular recs to start AC  - c/w Lovenox AC  - Vascular f/u for DVT removal    # Severe Thoracic compression fracture T12 complicated by worsening severe back pain, limited ROM, difficulty with ambulation due to pain 2/2 severe compression fx  MRI T-spine: multilevel compression deformities of the thoracic spine  - Pain management eval appreciated: dilaudid PRN, gabapentin, toradol PRN for breakthrough pain  - TLSO brace and PT eval  - CM/social work  - Kyphoplasty tentatively planned for 2/1    # Obesity BMI 35  - wt loss per PCP    # Hepatic cyst  - f/u OP     # Hx/o cerebral aneurysm s/p coiling   - no new deficits.     # Hypothyroidism  - cont synthroid    # h/o HTN   - c/w amlodipine     # h/o DLD  - c/w statin     #Misc  -DVT prophylaxis: Lovenox  -GI prophylaxis: Protonix  -Diet: DASH  -Code status: Full code  -Activity: IAT  -Dispo: Floors   82 year old female with past medical history of DLD, ILD on 2-3L home O2, compression fx thoracic spine, IVC filter, and hypothyroidism presents after recent discharge for shortness of breath and back pain.  Found in the hospital to have COVID.  Transferred here from [facility] for neurosurgery regarding compression fracture, found here to have extensive DVT.  Patient has an IVC filter, following up neuroendovascular surgery on whether patient can have AC.     # COVID19- stable- at 2-3L which is home O2  # ILD  Left midlung bandlike opacity. Lung volumes. No focal consolidation otherwise. No pneumothorax or pleural effusion.  2-3L home O2, back on baseline requirement  s/p RDV, she was given one dose of decadron at the time, was not continued    # Recurrent DVT  # New PE- stable- No right heart strain  - team called by vascular for extensive DVTs in right leg  - has an IVC filter, couldn't be on AC in the past because of aneurysm, neuroendovascular recs to start AC  - c/w Lovenox AC- F/u MRA brain as per Neuroendovascular  - Vascular recs no intervention OP follow up(Talked @6071 on 1/25)    # Severe Thoracic compression fracture T12 complicated by worsening severe back pain, limited ROM, difficulty with ambulation due to pain 2/2 severe compression fx  MRI T-spine: multilevel compression deformities of the thoracic spine  - Pain management eval appreciated: dilaudid PRN, gabapentin, toradol PRN for breakthrough pain  - TLSO brace and PT eval  - CM/social work  - Kyphoplasty tentatively planned for 2/1    # Obesity BMI 35  - wt loss per PCP    # Hepatic cyst  - f/u OP     # Hx/o cerebral aneurysm s/p coiling   - no new deficits.     # Hypothyroidism  - cont synthroid    # h/o HTN   - c/w amlodipine     # h/o DLD  - c/w statin     #Misc  -DVT prophylaxis: Lovenox  -GI prophylaxis: Protonix  -Diet: DASH  -Code status: Full code  -Activity: IAT  -Dispo: Floors   82 year old female with past medical history of DLD, ILD on 2-3L home O2, compression fx thoracic spine, IVC filter, and hypothyroidism presents after recent discharge for shortness of breath and back pain.  Found in the hospital to have COVID.  Transferred here from [facility] for neurosurgery regarding compression fracture, found here to have extensive DVT.  Patient has an IVC filter, following up neuroendovascular surgery on whether patient can have AC.     # New SOB- need to workup further  # COVID19- stable- s/p remdesevir  # ILD  # Recurrent DVT  # New PE- stable- No right heart strain  - team called by vascular for extensive DVTs in right leg  - has an IVC filter, couldn't be on AC in the past because of aneurysm, neuroendovascular recs to start AC  - c/w Lovenox AC- F/u MRA brain as per Neuroendovascular  - Vascular recs no intervention OP follow up(Talked @5464 on 1/25)  - F/u TTE    # Severe Thoracic compression fracture T12 complicated by worsening severe back pain, limited ROM, difficulty with ambulation due to pain 2/2 severe compression fx  MRI T-spine: multilevel compression deformities of the thoracic spine  - Pain management eval appreciated: dilaudid PRN, gabapentin, toradol PRN for breakthrough pain  - TLSO brace and PT eval  - CM/social work  - Kyphoplasty will be done if patient not able to ambulate  - Talked to Neurosurgery attending, for now will do conservative management- pain control and physical therapy    # Obesity BMI 35  - wt loss per PCP    # Hepatic cyst  - f/u OP     # Hx/o cerebral aneurysm s/p coiling   - no new deficits.     # Hypothyroidism  - cont synthroid    # h/o HTN   - c/w amlodipine     # h/o DLD  - c/w statin     #Misc  -DVT prophylaxis: Lovenox  -GI prophylaxis: Protonix  -Diet: DASH  -Code status: Full code  -Activity: IAT  -Dispo: Floors

## 2023-01-25 NOTE — CHART NOTE - NSCHARTNOTEFT_GEN_A_CORE
Patient seen and examined at bedside by Dr. Hwang. Pt currently being treated for DVT, seen by Vascular who is recommended anticoagulation and r/o of PE with CTA chest.   - At this time, treatment of DVT with anticoagulation supersedes plan for Kyphoplasty  - Will re-evaluate patient for surgery once medically optimized   - From a neurosurgical standpoint pt is cleared to be OOB with PT/OT/Rehab Patient seen and examined at bedside by Dr. Hwang. Pt currently being treated for DVT/PE, seen by Vascular who is recommended anticoagulation.   - At this time, treatment of DVT with anticoagulation supersedes plan for Kyphoplasty  - Will re-evaluate patient for surgery once medically optimized.   - From a neurosurgical standpoint pt is cleared to be OOB with PT/OT/Rehab; recommend OOB 3 times daily with assistance.    - Recommend inpatient pain management consult.

## 2023-01-25 NOTE — CONSULT NOTE ADULT - SUBJECTIVE AND OBJECTIVE BOX
HPI:  82 year old female with past medical history of DLD, ILD on 2-3L home O2, compression fx thoracic spine, IVC filter, and hypothyroidism presents after recent discharge for shortness of breath and back pain. Patient lives at home alone, and is unable to care for herself. She had a visiting nurse service once since discharge who agreed the patient is unsafe to be living at home. Patient was found to be covid positive upon admission. She states that she has a slight sore throat and had one loose BM today, but does not feel any more short of breath than she she has been since last admission. Patient also states that over the last month, she has been having swelling of both lower legs. She denies chest pain/nausea/vomiting/ abdominal pain. Her back pain has been controlled on 20mg oxycodone.     #COVID19 s/p RDV   -Left midlung bandlike opacity. Lung volumes. No focal consolidation otherwise. No pneumothorax or pleural effusion.  -2-3L home O2, back on baseline requirement  -s/p RDV, she was given one dose of decadron at the time, was not continued  -ECHO ordered     #Recurrent DVT  #New PE, no right heart strain   -per vascular, no need for thrombectomy   - has an IVC filter, couldn't be on AC in the past because of aneurysm, per neuroendovascular can restart a/c  -now on LVX 80mg sq bid  -will get MRA brain per neuroendovascular     # Severe Thoracic compression fracture T12 complicated by worsening severe back pain, limited ROM, difficulty with ambulation due to pain 2/2 severe compression fx  -MRI T-spine: multilevel compression deformities of the thoracic spine  -Neurosurgery was planning on intervention, however DVT was found  - Pain management eval appreciated: dilaudid PRN, gabapentin, toradol PRN for breakthrough pain  - TLSO brace and PT eval  - CM/social work  -per neurosurgery, can do PT/OT/Rehab, if can walk then can discharge and f/u o/p; if not, then will need to do kyphoplasty     Medical charts / labs / imaging studies / PT notes reviewed     PAST MEDICAL & SURGICAL HISTORY:  Hypothyroid      Hyperlipemia      Brain aneurysm      Compression fracture of spine      Presence of IVC filter      S/P appendectomy      S/P coil embolization of cerebral aneurysm          Hospital Course:    TODAY'S SUBJECTIVE & REVIEW OF SYMPTOMS:     Constitutional WNL   Cardio WNL   Resp sob    GI WNL  Heme WNL  Endo WNL  Skin WNL  MSK back pain  Neuro WNL  Cognitive WNL  Psych WNL      MEDICATIONS  (STANDING):  amLODIPine   Tablet 2.5 milliGRAM(s) Oral daily  atorvastatin 20 milliGRAM(s) Oral at bedtime  benzonatate 100 milliGRAM(s) Oral every 8 hours  budesonide 160 MICROgram(s)/formoterol 4.5 MICROgram(s) Inhaler 2 Puff(s) Inhalation two times a day  enoxaparin Injectable 80 milliGRAM(s) SubCutaneous every 12 hours  furosemide    Tablet 20 milliGRAM(s) Oral daily  gabapentin 300 milliGRAM(s) Oral three times a day  levothyroxine 25 MICROGram(s) Oral daily  lidocaine   4% Patch 1 Patch Transdermal daily  pantoprazole    Tablet 40 milliGRAM(s) Oral before breakfast  polyethylene glycol 3350 17 Gram(s) Oral daily  senna 2 Tablet(s) Oral at bedtime  tiotropium 2.5 MICROgram(s) Inhaler 2 Puff(s) Inhalation daily    MEDICATIONS  (PRN):  acetaminophen     Tablet .. 650 milliGRAM(s) Oral every 6 hours PRN Temp greater or equal to 38C (100.4F), Mild Pain (1 - 3)  albuterol    90 MICROgram(s) HFA Inhaler 2 Puff(s) Inhalation every 6 hours PRN Bronchospasm  HYDROmorphone   Tablet 4 milliGRAM(s) Oral every 4 hours PRN Severe Pain (7 - 10)  sodium chloride 0.65% Nasal 1 Spray(s) Both Nostrils four times a day PRN Nasal Congestion      FAMILY HISTORY:      Allergies    Compazine (Unknown)    Intolerances        SOCIAL HISTORY:    [  ] Etoh  [  ] Smoking  [  ] Substance abuse     Home Environment:  [  x ] Home Alone  [   ] Lives with Family  [   ] Home Health Aid    Dwelling:  [   ] Apartment  [ x  ] Private House  [   ] Adult Home  [   ] Skilled Nursing Facility      [   ] Short Term  [   ] Long Term  [ x  ] Stairs       Elevator [   ]    FUNCTIONAL STATUS PTA: (Check all that apply)  Ambulation: [  x  ]Independent    [   ] Dependent     [   ] Non-Ambulatory  Assistive Device: [   ] SA Cane  [   ]  Q Cane  [   ] Walker  [   ]  Wheelchair  ADL : [x   ] Independent  [    ]  Dependent       Vital Signs Last 24 Hrs  T(C): 36.7 (25 Jan 2023 08:48), Max: 36.7 (25 Jan 2023 08:48)  T(F): 98.1 (25 Jan 2023 08:48), Max: 98.1 (25 Jan 2023 08:48)  HR: 91 (25 Jan 2023 08:48) (86 - 96)  BP: 103/58 (25 Jan 2023 08:48) (103/58 - 132/67)  BP(mean): --  RR: 18 (25 Jan 2023 08:48) (18 - 18)  SpO2: 92% (25 Jan 2023 08:48) (92% - 97%)          PHYSICAL EXAM: Awake & Alert  GENERAL: NAD  HEAD:  Normocephalic  CHEST/LUNG: Clear   HEART: S1S2+  ABDOMEN: Soft, Nontender  EXTREMITIES:  no calf tenderness    NERVOUS SYSTEM:  Cranial Nerves 2-12 intact [   ] Abnormal  [   ]  ROM: WFL all extremities [  x ]  Abnormal [   ]  Motor Strength: WFL all extremities  [  x ]  Abnormal [   ]  Sensation: intact to light touch [ x  ] Abnormal [   ]    FUNCTIONAL STATUS:  Bed Mobility: Independent [   ]  Supervision [ x  ]  Needs Assistance [   ]  N/A [   ]  Transfers: Independent [   ]  Supervision [ x  ]  Needs Assistance [   ]  N/A [   ]   Ambulation: Independent [   ]  Supervision [  x ]  Needs Assistance [   ]  N/A [   ]  ADL: Independent [   ] Requires Assistance [   ] N/A [   ]      LABS:                        10.2   7.31  )-----------( 240      ( 25 Jan 2023 07:07 )             32.1     01-25    139  |  95<L>  |  9<L>  ----------------------------<  101<H>  3.4<L>   |  34<H>  |  0.8    Ca    8.7      25 Jan 2023 07:07  Phos  2.8     01-24  Mg     2.2     01-25    TPro  5.2<L>  /  Alb  3.1<L>  /  TBili  0.9  /  DBili  x   /  AST  15  /  ALT  10  /  AlkPhos  112  01-25    PT/INR - ( 25 Jan 2023 07:07 )   PT: 13.40 sec;   INR: 1.17 ratio         PTT - ( 24 Jan 2023 15:17 )  PTT:30.5 sec      RADIOLOGY & ADDITIONAL STUDIES:

## 2023-01-25 NOTE — PROGRESS NOTE ADULT - SUBJECTIVE AND OBJECTIVE BOX
SUBJECTIVE:  HPI:  82 year old female with past medical history of DLD, ILD on 2-3L home O2, compression fx thoracic spine, IVC filter, and hypothyroidism presents after recent discharge for shortness of breath and back pain. Patient lives at home alone, and is unable to care for herself. She had a visiting nurse service once since discharge who agreed the patient is unsafe to be living at home. Patient was found to be covid positive upon admission. She states that she has a slight sore throat and had one loose BM today, but does not feel any more short of breath than she she has been since last admission. Patient also states that over the last month, she has been having swelling of both lower legs. She denies chest pain/nausea/vomiting/ abdominal pain. Her back pain has been controlled on 20mg oxycodone.  (16 Jan 2023 16:43)      Patient is a 82y old Female who presents with a chief complaint of back pain (22 Jan 2023 09:15)    Currently admitted to medicine with the primary diagnosis of 2019 novel coronavirus disease (COVID-19)       Today is hospital day 9d.     PAST MEDICAL & SURGICAL HISTORY  Hypothyroid    Hyperlipemia    Brain aneurysm    Compression fracture of spine    Presence of IVC filter    S/P appendectomy    S/P coil embolization of cerebral aneurysm        ALLERGIES:  Compazine (Unknown)    MEDICATIONS:  ACTIVE MEDICATIONS  acetaminophen     Tablet .. 650 milliGRAM(s) Oral every 6 hours PRN  albuterol    90 MICROgram(s) HFA Inhaler 2 Puff(s) Inhalation every 6 hours PRN  amLODIPine   Tablet 2.5 milliGRAM(s) Oral daily  atorvastatin 20 milliGRAM(s) Oral at bedtime  benzonatate 100 milliGRAM(s) Oral every 8 hours  budesonide 160 MICROgram(s)/formoterol 4.5 MICROgram(s) Inhaler 2 Puff(s) Inhalation two times a day  enoxaparin Injectable 80 milliGRAM(s) SubCutaneous every 12 hours  furosemide    Tablet 20 milliGRAM(s) Oral daily  gabapentin 300 milliGRAM(s) Oral three times a day  HYDROmorphone   Tablet 4 milliGRAM(s) Oral every 4 hours PRN  hydrOXYzine hydrochloride 25 milliGRAM(s) Oral at bedtime PRN  levothyroxine 25 MICROGram(s) Oral daily  lidocaine   4% Patch 1 Patch Transdermal daily  pantoprazole    Tablet 40 milliGRAM(s) Oral before breakfast  polyethylene glycol 3350 17 Gram(s) Oral daily  senna 2 Tablet(s) Oral at bedtime  sodium chloride 0.65% Nasal 1 Spray(s) Both Nostrils four times a day PRN  tiotropium 2.5 MICROgram(s) Inhaler 2 Puff(s) Inhalation daily      VITALS:   T(F): 97.7  HR: 86  BP: 107/56  RR: 18  SpO2: 97%    LABS:                        10.2   7.31  )-----------( 240      ( 25 Jan 2023 07:07 )             32.1     01-24    135  |  92<L>  |  10  ----------------------------<  141<H>  3.4<L>   |  30  |  0.8    Ca    8.8      24 Jan 2023 13:30  Phos  2.8     01-24  Mg     1.7     01-24    TPro  5.9<L>  /  Alb  3.5  /  TBili  1.3<H>  /  DBili  x   /  AST  19  /  ALT  10  /  AlkPhos  135<H>  01-24    PT/INR - ( 25 Jan 2023 07:07 )   PT: 13.40 sec;   INR: 1.17 ratio         PTT - ( 24 Jan 2023 15:17 )  PTT:30.5 sec      Troponin T, Serum: <0.01 ng/mL (01-24-23 @ 13:30)      CARDIAC MARKERS ( 24 Jan 2023 13:30 )  x     / <0.01 ng/mL / x     / x     / x            PHYSICAL EXAM:  GEN: No acute distress  LUNGS: Clear to auscultation bilaterally   HEART: S1/S2 present.    ABD: Soft, non-tender, non-distended.   EXT: Pedal edema 2+ b/l  NEURO: AAOX3

## 2023-01-25 NOTE — PROGRESS NOTE ADULT - ASSESSMENT
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  Found in the hospital to have COVID.  Transferred here from [facility] for neurosurgery regarding compression fracture, found here to have extensive DVT.  Patient has an IVC filter, following up neuroendovascular surgery on whether patient can have AC.     #Acute hypoxemic respiratory failure  #COVID19 s/p RDV   -Left midlung bandlike opacity. Lung volumes. No focal consolidation otherwise. No pneumothorax or pleural effusion.  -2-3L home O2, back on baseline requirement  -s/p RDV, she was given one dose of decadron at the time, was not continued  -ECHO ordered     #Recurrent DVT  #New PE, no right heart strain   -per vascular, no need for thrombectomy   - has an IVC filter, couldn't be on AC in the past because of aneurysm, per neuroendovascular can restart a/c  -now on LVX 80mg sq bid  -will get MRA brain per neuroendovascular     # Severe Thoracic compression fracture T12 complicated by worsening severe back pain, limited ROM, difficulty with ambulation due to pain 2/2 severe compression fx  -MRI T-spine: multilevel compression deformities of the thoracic spine  -Neurosurgery was planning on intervention, however DVT was found  - Pain management eval appreciated: dilaudid PRN, gabapentin, toradol PRN for breakthrough pain  - TLSO brace and PT eval  - CM/social work  -per neurosurgery, can do PT/OT/Rehab, if can walk then can discharge and f/u o/p; if not, then will need to do kyphoplasty     #Obesity BMI 35  - wt loss per PCP    #Hepatic cyst  - f/u OP     #Hx/o cerebral aneurysm s/p coiling   - no new deficits.     #Hypothyroidism  - cont synthroid    #h/o HTN   - c/w amlodipine     #Hypokalemia  -replete     #h/o DLD   - c/w statin     Diet - DASH   DVT ppx: LVX 80mg sq bid     Dispo: acute, f/u MRA brain, wean O2 as tolerated

## 2023-01-26 LAB
ALBUMIN SERPL ELPH-MCNC: 3.1 G/DL — LOW (ref 3.5–5.2)
ALP SERPL-CCNC: 119 U/L — HIGH (ref 30–115)
ALT FLD-CCNC: 10 U/L — SIGNIFICANT CHANGE UP (ref 0–41)
ANION GAP SERPL CALC-SCNC: 12 MMOL/L — SIGNIFICANT CHANGE UP (ref 7–14)
AST SERPL-CCNC: 17 U/L — SIGNIFICANT CHANGE UP (ref 0–41)
BASOPHILS # BLD AUTO: 0.06 K/UL — SIGNIFICANT CHANGE UP (ref 0–0.2)
BASOPHILS NFR BLD AUTO: 0.7 % — SIGNIFICANT CHANGE UP (ref 0–1)
BILIRUB SERPL-MCNC: 0.9 MG/DL — SIGNIFICANT CHANGE UP (ref 0.2–1.2)
BUN SERPL-MCNC: 9 MG/DL — LOW (ref 10–20)
CALCIUM SERPL-MCNC: 9.1 MG/DL — SIGNIFICANT CHANGE UP (ref 8.4–10.5)
CHLORIDE SERPL-SCNC: 94 MMOL/L — LOW (ref 98–110)
CO2 SERPL-SCNC: 31 MMOL/L — SIGNIFICANT CHANGE UP (ref 17–32)
CREAT SERPL-MCNC: 0.8 MG/DL — SIGNIFICANT CHANGE UP (ref 0.7–1.5)
EGFR: 74 ML/MIN/1.73M2 — SIGNIFICANT CHANGE UP
EOSINOPHIL # BLD AUTO: 0.28 K/UL — SIGNIFICANT CHANGE UP (ref 0–0.7)
EOSINOPHIL NFR BLD AUTO: 3.3 % — SIGNIFICANT CHANGE UP (ref 0–8)
GLUCOSE SERPL-MCNC: 103 MG/DL — HIGH (ref 70–99)
HCT VFR BLD CALC: 35.4 % — LOW (ref 37–47)
HGB BLD-MCNC: 11.1 G/DL — LOW (ref 12–16)
IMM GRANULOCYTES NFR BLD AUTO: 0.7 % — HIGH (ref 0.1–0.3)
LYMPHOCYTES # BLD AUTO: 1.76 K/UL — SIGNIFICANT CHANGE UP (ref 1.2–3.4)
LYMPHOCYTES # BLD AUTO: 20.8 % — SIGNIFICANT CHANGE UP (ref 20.5–51.1)
MAGNESIUM SERPL-MCNC: 2.1 MG/DL — SIGNIFICANT CHANGE UP (ref 1.8–2.4)
MCHC RBC-ENTMCNC: 30.8 PG — SIGNIFICANT CHANGE UP (ref 27–31)
MCHC RBC-ENTMCNC: 31.4 G/DL — LOW (ref 32–37)
MCV RBC AUTO: 98.3 FL — SIGNIFICANT CHANGE UP (ref 81–99)
MONOCYTES # BLD AUTO: 1.2 K/UL — HIGH (ref 0.1–0.6)
MONOCYTES NFR BLD AUTO: 14.2 % — HIGH (ref 1.7–9.3)
NEUTROPHILS # BLD AUTO: 5.11 K/UL — SIGNIFICANT CHANGE UP (ref 1.4–6.5)
NEUTROPHILS NFR BLD AUTO: 60.3 % — SIGNIFICANT CHANGE UP (ref 42.2–75.2)
NRBC # BLD: 0 /100 WBCS — SIGNIFICANT CHANGE UP (ref 0–0)
PLATELET # BLD AUTO: 230 K/UL — SIGNIFICANT CHANGE UP (ref 130–400)
POTASSIUM SERPL-MCNC: 3.5 MMOL/L — SIGNIFICANT CHANGE UP (ref 3.5–5)
POTASSIUM SERPL-SCNC: 3.5 MMOL/L — SIGNIFICANT CHANGE UP (ref 3.5–5)
PROT SERPL-MCNC: 5.5 G/DL — LOW (ref 6–8)
RBC # BLD: 3.6 M/UL — LOW (ref 4.2–5.4)
RBC # FLD: 13.7 % — SIGNIFICANT CHANGE UP (ref 11.5–14.5)
SODIUM SERPL-SCNC: 137 MMOL/L — SIGNIFICANT CHANGE UP (ref 135–146)
WBC # BLD: 8.47 K/UL — SIGNIFICANT CHANGE UP (ref 4.8–10.8)
WBC # FLD AUTO: 8.47 K/UL — SIGNIFICANT CHANGE UP (ref 4.8–10.8)

## 2023-01-26 PROCEDURE — 93306 TTE W/DOPPLER COMPLETE: CPT | Mod: 26

## 2023-01-26 PROCEDURE — 99233 SBSQ HOSP IP/OBS HIGH 50: CPT

## 2023-01-26 PROCEDURE — 70544 MR ANGIOGRAPHY HEAD W/O DYE: CPT | Mod: 26

## 2023-01-26 RX ADMIN — POLYETHYLENE GLYCOL 3350 17 GRAM(S): 17 POWDER, FOR SOLUTION ORAL at 11:52

## 2023-01-26 RX ADMIN — BUDESONIDE AND FORMOTEROL FUMARATE DIHYDRATE 2 PUFF(S): 160; 4.5 AEROSOL RESPIRATORY (INHALATION) at 08:36

## 2023-01-26 RX ADMIN — HYDROMORPHONE HYDROCHLORIDE 4 MILLIGRAM(S): 2 INJECTION INTRAMUSCULAR; INTRAVENOUS; SUBCUTANEOUS at 21:18

## 2023-01-26 RX ADMIN — ATORVASTATIN CALCIUM 20 MILLIGRAM(S): 80 TABLET, FILM COATED ORAL at 21:16

## 2023-01-26 RX ADMIN — HYDROMORPHONE HYDROCHLORIDE 4 MILLIGRAM(S): 2 INJECTION INTRAMUSCULAR; INTRAVENOUS; SUBCUTANEOUS at 21:48

## 2023-01-26 RX ADMIN — PANTOPRAZOLE SODIUM 40 MILLIGRAM(S): 20 TABLET, DELAYED RELEASE ORAL at 05:09

## 2023-01-26 RX ADMIN — Medication 25 MICROGRAM(S): at 05:09

## 2023-01-26 RX ADMIN — LIDOCAINE 1 PATCH: 4 CREAM TOPICAL at 11:51

## 2023-01-26 RX ADMIN — GABAPENTIN 300 MILLIGRAM(S): 400 CAPSULE ORAL at 05:10

## 2023-01-26 RX ADMIN — TIOTROPIUM BROMIDE 2 PUFF(S): 18 CAPSULE ORAL; RESPIRATORY (INHALATION) at 08:16

## 2023-01-26 RX ADMIN — LIDOCAINE 1 PATCH: 4 CREAM TOPICAL at 23:51

## 2023-01-26 RX ADMIN — Medication 20 MILLIGRAM(S): at 05:10

## 2023-01-26 RX ADMIN — AMLODIPINE BESYLATE 2.5 MILLIGRAM(S): 2.5 TABLET ORAL at 05:09

## 2023-01-26 RX ADMIN — ENOXAPARIN SODIUM 80 MILLIGRAM(S): 100 INJECTION SUBCUTANEOUS at 05:13

## 2023-01-26 RX ADMIN — GABAPENTIN 300 MILLIGRAM(S): 400 CAPSULE ORAL at 21:16

## 2023-01-26 RX ADMIN — HYDROMORPHONE HYDROCHLORIDE 4 MILLIGRAM(S): 2 INJECTION INTRAMUSCULAR; INTRAVENOUS; SUBCUTANEOUS at 14:43

## 2023-01-26 RX ADMIN — GABAPENTIN 300 MILLIGRAM(S): 400 CAPSULE ORAL at 13:06

## 2023-01-26 RX ADMIN — HYDROMORPHONE HYDROCHLORIDE 4 MILLIGRAM(S): 2 INJECTION INTRAMUSCULAR; INTRAVENOUS; SUBCUTANEOUS at 13:07

## 2023-01-26 RX ADMIN — ENOXAPARIN SODIUM 80 MILLIGRAM(S): 100 INJECTION SUBCUTANEOUS at 17:43

## 2023-01-26 NOTE — OCCUPATIONAL THERAPY INITIAL EVALUATION ADULT - PATIENT PROFILE REVIEW, REHAB EVAL
Pt chart thoroughly reviewed prior to IE.  Pt with new PE, cleared for OT Eval as per Dr. Israel Hay. Pt agreeable to OT eval. Pt seen 13:10-14:15/yes

## 2023-01-26 NOTE — PHYSICAL THERAPY INITIAL EVALUATION ADULT - LEVEL OF INDEPENDENCE: STAND/SIT, REHAB EVAL
HISTORY & PHYSICAL      HISTORY OF PRESENT ILLNESS    Ms. Narayanan is a 60 year old female history of H. pylori. Patient presented in November anemia and abdominal pain. EGD showed severe duodenitis. Biopsies were positive for H. pylori. Since. She states she's been feeling better however still does have some symptoms of reflux. Denies any melena no hematochezia.    Review of Systems    GENERAL: Patient denies fevers, chills, night sweats, or weight loss  ALLERGIC/IMMUNOLOGIC: No reactions  EYES: Patient denies significant visual difficulties  ENT/MOUTH: Patient denies ear pain or changes in hearing  HEMATOLOGIC/LYMPHATIC: Patient denies tender or palpable lymph nodes  BREASTS: Patient denies abnormal masses of breast, nipple discharge, or pain  RESPIRATORY: Patient denies chest pain or shortness of breath  CARDIOVASCULAR: Patient denies chest pain  GASTROINTESTINAL:  As above  MUSCULOSKELETAL: Patient denies joint pain or decreased range of motion  SKIN: Patient denies chronic rashes, inflammation, ulcerations or skin changes  NEUROLOGIC: Patient denies headache  PSYCHIATRIC: Patient denies depression or anxiety      Past Medical History      Hypothyroidism                                                Depressive disorder                                           Arthritis                                                     Past Surgical History      TUBAL LIGATION                                                  Comment: 1995    BREAST BIOPSY                                   1998          ESOPHAGOGASTRODUODENOSCOPY                      11/19/2018    COLONOSCOPY                                     11/19/2018    Family History    Family History   Problem Relation Age of Onset   • Cancer Mother         breast   • Cancer Father         lung   • Cancer, Prostate Brother    • Patient is unaware of any medical problems Daughter    • Patient is unaware of any medical problems Son    • High blood pressure Maternal 
Grandmother    • Myocardial Infarction Maternal Grandfather    • Patient is unaware of any medical problems Brother    • Patient is unaware of any medical problems Brother        Social History    Social History     Tobacco Use   Smoking Status Current Every Day Smoker   • Packs/day: 0.25   • Years: 40.00   • Pack years: 10.00   • Types: Cigarettes   Smokeless Tobacco Never Used   Tobacco Comment    already has     Social History     Substance and Sexual Activity   Alcohol Use Yes   • Alcohol/week: 0.0 oz    Comment: 8 drinks/week     History   Drug Use No       Medications    Medications Prior to Admission   Medication Sig Dispense Refill   • Multiple Vitamins-Iron (MULTIVITAMIN/IRON PO) Take 1 tablet by mouth daily.     • sucralfate (CARAFATE) 1 g tablet Take 1 tablet by mouth 3 times daily. 42 tablet 0   • DULoxetine (CYMBALTA) 30 MG capsule Take 3 to 4 capsules by mouth daily. Use the fourth capsule prior to stressful events. 120 capsule 5   • Ascorbic Acid (VITAMIN C) 100 MG tablet Take 100 mg by mouth daily.     • vitamin E 400 UNIT capsule Take 400 Units by mouth daily.     • pantoprazole (PROTONIX) 40 MG tablet Take 1 tablet by mouth daily. 90 tablet 6   • meloxicam (MOBIC) 15 MG tablet Take 1 tablet by mouth daily. 30 tablet 0       Allergies    ALLERGIES:   Allergen Reactions   • Bactrim [Sulfamethoxazole W/Trimethoprim (Co-Trimoxazole)] PRURITUS       Physical Exam    Vital Signs:    Visit Vitals  /71   Pulse 78   Temp 98.7 °F (37.1 °C) (Temporal)   Resp 17   Ht 5' 0.75\" (1.543 m)   Wt 62 kg   SpO2 95%   BMI 26.04 kg/m²     Constitutional:  Patient appears as stated age. No apparent distress.    HENT:  Normocephalic, atraumatic. Bilateral external ears normal. Oropharynx moist. No oral exudates. Nose normal.    Eyes:  Pupils equal, round, and reactive to light. Extraocular movements are intact. Conjunctivae normal. No discharge.    Neck:  Normal range of motion. No tenderness. Supple. No stridor. No 
masses.   Cardiovascular:  Normal heart rate. Normal rhythm. No murmurs.  Lungs:  Normal breath sounds. No respiratory distress. No wheezing. No chest tenderness.    Abdomen:  Soft, nontender, without masses or hernias. No hepatosplenomegaly. No palpable aneurysm.    Skin:  Warm. Dry. No erythema. No rash.    Extremities:  Intact distal pulses. No edema. No tenderness. No cyanosis. No clubbing. Good range of motion in all major joints. No tenderness to palpation or major deformities noted. Back - no tenderness.    Neurologic:  Alert and oriented x3. Normal motor function. Normal sensory function. No focal deficits noted.      labs    Lab Results   Component Value Date    SODIUM 143 10/29/2018    POTASSIUM 4.3 10/29/2018    BUN 10 10/29/2018    CREATININE 0.66 10/29/2018    WBC 6.4 10/29/2018    HCT 32.4 (L) 10/29/2018    HGB 10.0 (L) 10/29/2018     10/29/2018    GLUCOSE 96 10/29/2018    TSH 2.700 10/29/2018    BILIRUBIN 0.3 10/29/2018    AST 11 10/29/2018    ALBUMIN 3.5 (L) 10/29/2018         IMAGING STUDIES & OTHER STUDIES     none    assessment & plan    History of H. pylori. We'll proceed with EGD with MAC today. We discussed the risks and benefits of the procedure including but not limited to risk of bleeding, organ injury.        
contact guard

## 2023-01-26 NOTE — PHYSICAL THERAPY INITIAL EVALUATION ADULT - MANUAL MUSCLE TESTING RESULTS, REHAB EVAL
Not formally assessed due to spinal compression fx, appears at least 4/5
Not formally assessed due to spinal compression fx, appears at least 4/5
BLE at least 3/5 as noted with mobility

## 2023-01-26 NOTE — PROGRESS NOTE ADULT - SUBJECTIVE AND OBJECTIVE BOX
WILFREDO CHO  [facility] M34C [facility] 005 A ([facility] M3-4C [facility])      Patient is a 82y old  Female who presents with a chief complaint of back pain (22 Jan 2023 09:15)        Interval events:  Patient seen and examined at bedside. No acute events overnight. Says she still has some shortness of breath, but stable. Worked with PT. No chest pain.     -PMHx: Hypothyroid    Hyperlipemia    Brain aneurysm    Compression fracture of spine    Presence of IVC filter      -PSHx:S/P appendectomy    S/P coil embolization of cerebral aneurysm            REVIEW OF SYSTEMS:  CONSTITUTIONAL: No fever, weight loss, or fatigue  RESPIRATORY: as above   CARDIOVASCULAR: No chest pain, palpitations, dizziness, or leg swelling  GASTROINTESTINAL: No abdominal or epigastric pain. No nausea, vomiting, or hematemesis; No diarrhea or constipation. No melena or hematochezia.  NEUROLOGICAL: No headaches  LYMPH NODES: No enlarged glands  MUSCULOSKELETAL: No joint pain or swelling; No muscle, back, or extremity pain      T(C): , Max: 37.1 (01-25-23 @ 16:35)  HR: 107 (01-26-23 @ 05:04)  BP: 117/66 (01-26-23 @ 05:04)  RR: 18 (01-26-23 @ 05:04)  SpO2: 94% (01-26-23 @ 05:04)  CAPILLARY BLOOD GLUCOSE        PHYSICAL EXAM:  GENERAL: NAD, obese, lying in bed comfortably, on 2L NC  HEAD:  Atraumatic, Normocephalic  EYES: EOMI, PERRLA, conjunctiva and sclera clear  ENT: Moist mucous membranes  NECK: Supple, No JVD     CHEST/LUNG: CTABL   HEART: Regular rate and rhythm; No murmurs, rubs, or gallops  ABDOMEN: Bowel sounds present; Soft, Nontender, Nondistended. No hepatomegaly  EXTREMITIES:  2+ Peripheral Pulses, brisk capillary refill. 2+ b/l pitting edema LE's   NERVOUS SYSTEM:  Alert & Oriented X3, speech clear. No deficits   MSK: FROM all 4 extremities, full and equal strength  SKIN: No rashes or lesions    Spoke with consultants: Y[x ] N [ ]       LABS:          11.1  8.47  )-------(230          35.4  N=60.3  L=20.8  MCV=98.3    137|94<L>|9<L>  ------------------<103<H>  3.5|31|0.8  eGFR:--  Ca:9.1      PT/INR - ( 25 Jan 2023 07:07 )   PT: 13.40 sec;   INR: 1.17 ratio         PTT - ( 24 Jan 2023 15:17 )  PTT:30.5 sec      Microbiology:      RADIOLOGY & ADDITIONAL TESTS:        Medications:  acetaminophen     Tablet .. 650 milliGRAM(s) Oral every 6 hours PRN  albuterol    90 MICROgram(s) HFA Inhaler 2 Puff(s) Inhalation every 6 hours PRN  amLODIPine   Tablet 2.5 milliGRAM(s) Oral daily  atorvastatin 20 milliGRAM(s) Oral at bedtime  benzonatate 100 milliGRAM(s) Oral every 8 hours  budesonide 160 MICROgram(s)/formoterol 4.5 MICROgram(s) Inhaler 2 Puff(s) Inhalation two times a day  enoxaparin Injectable 80 milliGRAM(s) SubCutaneous every 12 hours  furosemide    Tablet 20 milliGRAM(s) Oral daily  gabapentin 300 milliGRAM(s) Oral three times a day  HYDROmorphone   Tablet 4 milliGRAM(s) Oral every 4 hours PRN  levothyroxine 25 MICROGram(s) Oral daily  lidocaine   4% Patch 1 Patch Transdermal daily  pantoprazole    Tablet 40 milliGRAM(s) Oral before breakfast  polyethylene glycol 3350 17 Gram(s) Oral daily  senna 2 Tablet(s) Oral at bedtime  sodium chloride 0.65% Nasal 1 Spray(s) Both Nostrils four times a day PRN  tiotropium 2.5 MICROgram(s) Inhaler 2 Puff(s) Inhalation daily

## 2023-01-26 NOTE — OCCUPATIONAL THERAPY INITIAL EVALUATION ADULT - PERTINENT HX OF CURRENT PROBLEM, REHAB EVAL
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 Patient lives at home alone, and is unable to care for herself. She had a visiting nurse service once since discharge who agreed the patient is unsafe to be living at home. Patient was found to be Covid positive upon admission. She states that she has a slight sore throat and had one loose BM today, but does not feel any more short of breath than she has been since last admission. Patient also states that over the last month, she has been having swelling of both lower legs. She denies chest pain/nausea/vomiting/ abdominal pain. Her back pain has been controlled on 20mg oxycodone. Pt with new PE, no right heart strain.

## 2023-01-26 NOTE — PHYSICAL THERAPY INITIAL EVALUATION ADULT - IMPAIRMENTS FOUND, PT EVAL
aerobic capacity/endurance/gait, locomotion, and balance/muscle strength
aerobic capacity/endurance/gait, locomotion, and balance/muscle strength
aerobic capacity/endurance/gait, locomotion, and balance/muscle strength/poor safety awareness

## 2023-01-26 NOTE — PHYSICAL THERAPY INITIAL EVALUATION ADULT - PERTINENT HX OF CURRENT PROBLEM, REHAB EVAL
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 Patient lives at home alone, and is unable to care for herself. She had a visiting nurse service once since discharge who agreed the patient is unsafe to be living at home. Patient was found to be covid positive upon admission. She states that she has a slight sore throat and had one loose BM today, but does not feel any more short of breath than she she has been since last admission. Patient also states that over the last month, she has been having swelling of both lower legs. She denies chest pain/nausea/vomiting/ abdominal pain. Her back pain has been controlled on 20mg oxycodone.
2019 Covid Weakness, Pain in joints, lower  History of Present Illness:   82 year old female with past medical history of DLD, ILD on 2-3L home O2, compression fx thoracic spine, IVC filter, and hypothyroidism presents after recent discharge for shortness of breath and back pain. Patient lives at home alone, and is unable to care for herself. She had a visiting nurse service once since discharge who agreed the patient is unsafe to be living at home. Patient was found to be covid positive upon admission.
2019 Covid Weakness, Pain in joints, lower  History of Present Illness:   82 year old female with past medical history of DLD, ILD on 2-3L home O2, compression fx thoracic spine, IVC filter, and hypothyroidism presents after recent discharge for shortness of breath and back pain. Patient lives at home alone, and is unable to care for herself. She had a visiting nurse service once since discharge who agreed the patient is unsafe to be living at home. Patient was found to be covid positive upon admission.

## 2023-01-26 NOTE — PHYSICAL THERAPY INITIAL EVALUATION ADULT - RANGE OF MOTION EXAMINATION, REHAB EVAL
bilateral lower extremity ROM was WFL (within functional limits)
no ROM deficits were identified
no ROM deficits were identified

## 2023-01-26 NOTE — PHYSICAL THERAPY INITIAL EVALUATION ADULT - IMPAIRMENTS CONTRIBUTING TO GAIT DEVIATIONS, PT EVAL
decreased endurance/impaired postural control/decreased strength
decreased strength
decreased strength

## 2023-01-26 NOTE — OCCUPATIONAL THERAPY INITIAL EVALUATION ADULT - PHYSICAL ASSIST/NONPHYSICAL ASSIST:TOILET, OT EVAL
1 person to assist with hygiene, 1 person to assist with steadying Pt in standing w RW/2 person assist

## 2023-01-26 NOTE — PHYSICAL THERAPY INITIAL EVALUATION ADULT - ADDITIONAL COMMENTS
Pt reports she lives alone in house with steps to enter and inside. Pt says she amb independently without AD PTA.
Pt reports she lives alone in house with steps to enter and inside. Pt says she amb independently without AD PTA.
pt lives alone in [place], 5-6 DEONDRE, 14 steps inside, no AD

## 2023-01-26 NOTE — OCCUPATIONAL THERAPY INITIAL EVALUATION ADULT - TRANSFER TRAINING, PT EVAL
By discharge, Pt will increase level of functioning for toilet transfer to supervision with rolling walker

## 2023-01-26 NOTE — OCCUPATIONAL THERAPY INITIAL EVALUATION ADULT - DIAGNOSIS, OT EVAL
Rehab of debility secondary to T12 spine comp fx / covid-19+ / DVT /  HLD, hypothyroidism, brain aneurysm

## 2023-01-26 NOTE — PHYSICAL THERAPY INITIAL EVALUATION ADULT - GENERAL OBSERVATIONS, REHAB EVAL
[id number]; pt encountered sitting at EOB with OT requesting to go to bathroom. Pt agreeable to PT.
10:05-10:30 Chart reviewed. Patient available to be seen for physical therapy, denies pain, confirmed with RN.  Pt rec'd in bed +Primafit, + 4L nasal O2 in NAD.
10:05-10:30 Chart reviewed. Patient available to be seen for physical therapy, denies pain, confirmed with RN.  Pt rec'd in bed +Primafit, + 4L nasal O2 in NAD.

## 2023-01-26 NOTE — OCCUPATIONAL THERAPY INITIAL EVALUATION ADULT - LEVEL OF INDEPENDENCE: DRESS UPPER BODY, OT EVAL
Pt required moderate assistance to don/doff wrap around garment; Pt educated on donning/doffing TSLO, Pt with good return demonstration with VC and min/mod assistance/moderate assist (50% patients effort)

## 2023-01-26 NOTE — PHYSICAL THERAPY INITIAL EVALUATION ADULT - ASSISTIVE DEVICE FOR TRANSFER: GAIT, REHAB EVAL
rolling walker
rolling walker
initial walk to bathroom pt lifted standard walker in order to make it to bathroom quicker, return from bathroom pt used RW/standard walker/rolling walker

## 2023-01-26 NOTE — OCCUPATIONAL THERAPY INITIAL EVALUATION ADULT - GENERAL OBSERVATIONS, REHAB EVAL
Pt encountered semifowler in bed, in NAD, +IV lock, +prima fit, +2L O2 via NC, with daughter and son in law at bedside. Pt with c/o pain to back 7/10. Pt in recent receipt of pain meds as per Pt and RN.

## 2023-01-26 NOTE — OCCUPATIONAL THERAPY INITIAL EVALUATION ADULT - LIVES WITH, PROFILE
Pt lives alone in a pvt home with 9 cats, ~7 DEONDRE (right side handrail), 15 steep steps to 2nd floor (right side handrail) to bedroom and full bath with shower. Half bath on first floor/alone

## 2023-01-26 NOTE — PHYSICAL THERAPY INITIAL EVALUATION ADULT - GAIT DEVIATIONS NOTED, PT EVAL
decreased jose/decreased step length/decreased stride length
decreased step length/decreased stride length
decreased jose/decreased step length/decreased stride length

## 2023-01-26 NOTE — PHYSICAL THERAPY INITIAL EVALUATION ADULT - BED MOBILITY LIMITATIONS, REHAB EVAL
impaired ability to control trunk for mobility
decreased ability to use arms for pushing/pulling/decreased ability to use legs for bridging/pushing/impaired ability to control trunk for mobility
decreased ability to use arms for pushing/pulling/decreased ability to use legs for bridging/pushing/impaired ability to control trunk for mobility

## 2023-01-26 NOTE — OCCUPATIONAL THERAPY INITIAL EVALUATION ADULT - ADDITIONAL COMMENTS
Patient reports being Independent in all ADLs and IADLs prior to recent decline. Pt uses no DME in home, but for grab bars by walk-in shower. Patient's daughter reports having a shower chair for mother to use, but mother has not used it yet. Additionally, daughter reports Pt with declining ability to care for home, has groceries delivered "not up the same level it was."

## 2023-01-26 NOTE — PHYSICAL THERAPY INITIAL EVALUATION ADULT - PLANNED THERAPY INTERVENTIONS, PT EVAL
balance training/bed mobility training/gait training/neuromuscular re-education/strengthening/transfer training
balance training/bed mobility training/gait training/strengthening/transfer training
balance training/bed mobility training/gait training/strengthening/transfer training

## 2023-01-26 NOTE — OCCUPATIONAL THERAPY INITIAL EVALUATION ADULT - ADL RETRAINING, OT EVAL
By discharge, Pt will increase level of functioning for upper body dressing/TLSO don/doffing to supervision

## 2023-01-26 NOTE — PHYSICAL THERAPY INITIAL EVALUATION ADULT - NSACTIVITYREC_GEN_A_PT
Pt educated on continued ambulation with nursing staff throughout the day. Pt educated on seated & supine ther ex, including ankle pumps, heel raises, knee ext, hip flex.

## 2023-01-26 NOTE — PHYSICAL THERAPY INITIAL EVALUATION ADULT - CRITERIA FOR SKILLED THERAPEUTIC INTERVENTIONS
impairments found/functional limitations in following categories/rehab potential/therapy frequency/predicted duration of therapy intervention/anticipated equipment needs at discharge/anticipated discharge recommendation
impairments found/functional limitations in following categories/rehab potential/therapy frequency/predicted duration of therapy intervention/anticipated equipment needs at discharge/anticipated discharge recommendation
impairments found

## 2023-01-27 LAB
ALBUMIN SERPL ELPH-MCNC: 3.1 G/DL — LOW (ref 3.5–5.2)
ALP SERPL-CCNC: 120 U/L — HIGH (ref 30–115)
ALT FLD-CCNC: 10 U/L — SIGNIFICANT CHANGE UP (ref 0–41)
ANION GAP SERPL CALC-SCNC: 11 MMOL/L — SIGNIFICANT CHANGE UP (ref 7–14)
AST SERPL-CCNC: 18 U/L — SIGNIFICANT CHANGE UP (ref 0–41)
BASOPHILS # BLD AUTO: 0.05 K/UL — SIGNIFICANT CHANGE UP (ref 0–0.2)
BASOPHILS NFR BLD AUTO: 0.6 % — SIGNIFICANT CHANGE UP (ref 0–1)
BILIRUB SERPL-MCNC: 0.9 MG/DL — SIGNIFICANT CHANGE UP (ref 0.2–1.2)
BUN SERPL-MCNC: 9 MG/DL — LOW (ref 10–20)
CALCIUM SERPL-MCNC: 8.7 MG/DL — SIGNIFICANT CHANGE UP (ref 8.4–10.5)
CHLORIDE SERPL-SCNC: 96 MMOL/L — LOW (ref 98–110)
CO2 SERPL-SCNC: 33 MMOL/L — HIGH (ref 17–32)
CREAT SERPL-MCNC: 0.7 MG/DL — SIGNIFICANT CHANGE UP (ref 0.7–1.5)
EGFR: 87 ML/MIN/1.73M2 — SIGNIFICANT CHANGE UP
EOSINOPHIL # BLD AUTO: 0.22 K/UL — SIGNIFICANT CHANGE UP (ref 0–0.7)
EOSINOPHIL NFR BLD AUTO: 2.7 % — SIGNIFICANT CHANGE UP (ref 0–8)
GLUCOSE SERPL-MCNC: 91 MG/DL — SIGNIFICANT CHANGE UP (ref 70–99)
HCT VFR BLD CALC: 34.4 % — LOW (ref 37–47)
HGB BLD-MCNC: 10.8 G/DL — LOW (ref 12–16)
IMM GRANULOCYTES NFR BLD AUTO: 0.7 % — HIGH (ref 0.1–0.3)
LYMPHOCYTES # BLD AUTO: 1.86 K/UL — SIGNIFICANT CHANGE UP (ref 1.2–3.4)
LYMPHOCYTES # BLD AUTO: 22.7 % — SIGNIFICANT CHANGE UP (ref 20.5–51.1)
MAGNESIUM SERPL-MCNC: 1.9 MG/DL — SIGNIFICANT CHANGE UP (ref 1.8–2.4)
MCHC RBC-ENTMCNC: 30.5 PG — SIGNIFICANT CHANGE UP (ref 27–31)
MCHC RBC-ENTMCNC: 31.4 G/DL — LOW (ref 32–37)
MCV RBC AUTO: 97.2 FL — SIGNIFICANT CHANGE UP (ref 81–99)
MONOCYTES # BLD AUTO: 1.06 K/UL — HIGH (ref 0.1–0.6)
MONOCYTES NFR BLD AUTO: 12.9 % — HIGH (ref 1.7–9.3)
NEUTROPHILS # BLD AUTO: 4.96 K/UL — SIGNIFICANT CHANGE UP (ref 1.4–6.5)
NEUTROPHILS NFR BLD AUTO: 60.4 % — SIGNIFICANT CHANGE UP (ref 42.2–75.2)
NRBC # BLD: 0 /100 WBCS — SIGNIFICANT CHANGE UP (ref 0–0)
PLATELET # BLD AUTO: 248 K/UL — SIGNIFICANT CHANGE UP (ref 130–400)
POTASSIUM SERPL-MCNC: 3.4 MMOL/L — LOW (ref 3.5–5)
POTASSIUM SERPL-SCNC: 3.4 MMOL/L — LOW (ref 3.5–5)
PROT SERPL-MCNC: 5.3 G/DL — LOW (ref 6–8)
RBC # BLD: 3.54 M/UL — LOW (ref 4.2–5.4)
RBC # FLD: 13.6 % — SIGNIFICANT CHANGE UP (ref 11.5–14.5)
SODIUM SERPL-SCNC: 140 MMOL/L — SIGNIFICANT CHANGE UP (ref 135–146)
TROPONIN T SERPL-MCNC: <0.01 NG/ML — SIGNIFICANT CHANGE UP
WBC # BLD: 8.21 K/UL — SIGNIFICANT CHANGE UP (ref 4.8–10.8)
WBC # FLD AUTO: 8.21 K/UL — SIGNIFICANT CHANGE UP (ref 4.8–10.8)

## 2023-01-27 PROCEDURE — 93010 ELECTROCARDIOGRAM REPORT: CPT

## 2023-01-27 PROCEDURE — 71045 X-RAY EXAM CHEST 1 VIEW: CPT | Mod: 26

## 2023-01-27 PROCEDURE — 99233 SBSQ HOSP IP/OBS HIGH 50: CPT

## 2023-01-27 PROCEDURE — 71275 CT ANGIOGRAPHY CHEST: CPT | Mod: 26

## 2023-01-27 PROCEDURE — 74174 CTA ABD&PLVS W/CONTRAST: CPT | Mod: 26

## 2023-01-27 PROCEDURE — 99221 1ST HOSP IP/OBS SF/LOW 40: CPT

## 2023-01-27 RX ORDER — LIDOCAINE 4 G/100G
1 CREAM TOPICAL DAILY
Refills: 0 | Status: DISCONTINUED | OUTPATIENT
Start: 2023-01-27 | End: 2023-02-04

## 2023-01-27 RX ORDER — APIXABAN 2.5 MG/1
10 TABLET, FILM COATED ORAL EVERY 12 HOURS
Refills: 0 | Status: COMPLETED | OUTPATIENT
Start: 2023-01-27 | End: 2023-02-03

## 2023-01-27 RX ADMIN — POLYETHYLENE GLYCOL 3350 17 GRAM(S): 17 POWDER, FOR SOLUTION ORAL at 11:43

## 2023-01-27 RX ADMIN — HYDROMORPHONE HYDROCHLORIDE 4 MILLIGRAM(S): 2 INJECTION INTRAMUSCULAR; INTRAVENOUS; SUBCUTANEOUS at 07:55

## 2023-01-27 RX ADMIN — PANTOPRAZOLE SODIUM 40 MILLIGRAM(S): 20 TABLET, DELAYED RELEASE ORAL at 05:08

## 2023-01-27 RX ADMIN — HYDROMORPHONE HYDROCHLORIDE 4 MILLIGRAM(S): 2 INJECTION INTRAMUSCULAR; INTRAVENOUS; SUBCUTANEOUS at 11:43

## 2023-01-27 RX ADMIN — TIOTROPIUM BROMIDE 2 PUFF(S): 18 CAPSULE ORAL; RESPIRATORY (INHALATION) at 09:12

## 2023-01-27 RX ADMIN — AMLODIPINE BESYLATE 2.5 MILLIGRAM(S): 2.5 TABLET ORAL at 05:09

## 2023-01-27 RX ADMIN — HYDROMORPHONE HYDROCHLORIDE 4 MILLIGRAM(S): 2 INJECTION INTRAMUSCULAR; INTRAVENOUS; SUBCUTANEOUS at 17:59

## 2023-01-27 RX ADMIN — ATORVASTATIN CALCIUM 20 MILLIGRAM(S): 80 TABLET, FILM COATED ORAL at 21:33

## 2023-01-27 RX ADMIN — HYDROMORPHONE HYDROCHLORIDE 4 MILLIGRAM(S): 2 INJECTION INTRAMUSCULAR; INTRAVENOUS; SUBCUTANEOUS at 10:13

## 2023-01-27 RX ADMIN — HYDROMORPHONE HYDROCHLORIDE 4 MILLIGRAM(S): 2 INJECTION INTRAMUSCULAR; INTRAVENOUS; SUBCUTANEOUS at 03:32

## 2023-01-27 RX ADMIN — ENOXAPARIN SODIUM 80 MILLIGRAM(S): 100 INJECTION SUBCUTANEOUS at 05:08

## 2023-01-27 RX ADMIN — GABAPENTIN 300 MILLIGRAM(S): 400 CAPSULE ORAL at 14:34

## 2023-01-27 RX ADMIN — Medication 25 MICROGRAM(S): at 05:08

## 2023-01-27 RX ADMIN — LIDOCAINE 1 PATCH: 4 CREAM TOPICAL at 23:43

## 2023-01-27 RX ADMIN — Medication 20 MILLIGRAM(S): at 05:09

## 2023-01-27 RX ADMIN — LIDOCAINE 1 PATCH: 4 CREAM TOPICAL at 11:43

## 2023-01-27 RX ADMIN — SENNA PLUS 2 TABLET(S): 8.6 TABLET ORAL at 21:33

## 2023-01-27 RX ADMIN — GABAPENTIN 300 MILLIGRAM(S): 400 CAPSULE ORAL at 05:08

## 2023-01-27 RX ADMIN — HYDROMORPHONE HYDROCHLORIDE 4 MILLIGRAM(S): 2 INJECTION INTRAMUSCULAR; INTRAVENOUS; SUBCUTANEOUS at 23:45

## 2023-01-27 RX ADMIN — APIXABAN 10 MILLIGRAM(S): 2.5 TABLET, FILM COATED ORAL at 18:00

## 2023-01-27 RX ADMIN — BUDESONIDE AND FORMOTEROL FUMARATE DIHYDRATE 2 PUFF(S): 160; 4.5 AEROSOL RESPIRATORY (INHALATION) at 21:32

## 2023-01-27 RX ADMIN — GABAPENTIN 300 MILLIGRAM(S): 400 CAPSULE ORAL at 21:33

## 2023-01-27 RX ADMIN — HYDROMORPHONE HYDROCHLORIDE 4 MILLIGRAM(S): 2 INJECTION INTRAMUSCULAR; INTRAVENOUS; SUBCUTANEOUS at 07:30

## 2023-01-27 RX ADMIN — HYDROMORPHONE HYDROCHLORIDE 4 MILLIGRAM(S): 2 INJECTION INTRAMUSCULAR; INTRAVENOUS; SUBCUTANEOUS at 03:02

## 2023-01-27 NOTE — CHART NOTE - NSCHARTNOTEFT_GEN_A_CORE
Vascular team called regarding necessity of restarting anticoagulation in the setting of newly found CTA findings consistent with right properitoneal hematoma. Patient recently on therapuetic LVX for treatment of RLE DVTs and PE. Last night, patient complained of sharp right sided chest pain radiating to back. STAT CTA performed to r/o dissection with official read as below:     < from: CT Angio Abdomen and Pelvis w/ IV Cont (01.27.23 @ 16:33) >  IMPRESSION:  No evidence of thoracic or abdominal aortic aneurysm or dissection.    There is a crescent shaped hyperdense collection (50 Hounsfield units)   deep to the musculature of the right sided abdominal wall, anterior to   the right 10th rib, and lateral to the retroperitoneal fat. Finding is   compatible with a focal hematoma in the right properitoneal space.    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 In this scenario, benefit of anticoagulation supersedes risk. Would recommend restarting anticoagulation. Patient's Hgb actively stable, however, please continue to monitor. Plan discussed with Vascular fellow, Dr. Yadav.    Do not hesitate to call with further questions/concerns.  [phone]

## 2023-01-27 NOTE — PROGRESS NOTE ADULT - ASSESSMENT
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  Found in the hospital to have COVID.  Transferred here from [facility] for neurosurgery regarding compression fracture, found here to have extensive DVT.  Patient has an IVC filter, following up neuroendovascular surgery on whether patient can have AC.     #Acute hypoxemic respiratory failure  #COVID19 s/p RDV   -Left midlung bandlike opacity. Lung volumes. No focal consolidation otherwise. No pneumothorax or pleural effusion.  -2-3L home O2, back on baseline requirement  -s/p RDV, she was given one dose of decadron at the time, was not continued  -ECHO reviewed     #Recurrent DVT  #New PE, no right heart strain   -per vascular, no need for thrombectomy   - has an IVC filter, couldn't be on AC in the past because of aneurysm, per neuroendovascular can restart a/c  -now on LVX 80mg sq bid; spoke with neuroendovascular, can do full dose a/c with Eliquis on d/c   -MRA brain no bleeding     #Severe tearing chest pain radiating to back  -has full and equal pulses throughout  -patient does have PE, but she says this pain is "different"  -CTA chest and abdomen ordered to r/o dissection -FOLLOW UP  -she does have tenderness over right rib, but wouldn't make sense why it is radiating to back; if dissection ruled out can treat costochondritis     # Severe Thoracic compression fracture T12 complicated by worsening severe back pain, limited ROM, difficulty with ambulation due to pain 2/2 severe compression fx  -MRI T-spine: multilevel compression deformities of the thoracic spine  -Neurosurgery was planning on intervention, however DVT was found  - Pain management eval appreciated: dilaudid PRN, gabapentin, toradol PRN for breakthrough pain  - TLSO brace and PT eval  - CM/social work  -per neurosurgery, can do PT/OT/Rehab, if can walk then can discharge and f/u o/p; if not, then will need to do kyphoplasty - looks like patient did ok with both PT and rehab; will start d/c planning     #Obesity BMI 35  - wt loss per PCP    #Hepatic cyst  - f/u OP     #Hx/o cerebral aneurysm s/p coiling   - no new deficits.     #Hypothyroidism  - cont synthroid    #h/o HTN   - c/w amlodipine     #Hypokalemia  -replete     #h/o DLD   - c/w statin     Diet - DASH   DVT ppx: LVX 80mg sq bid     Dispo: f/u CTA, d/c planning if negative

## 2023-01-27 NOTE — PROGRESS NOTE ADULT - SUBJECTIVE AND OBJECTIVE BOX
WILFREDO CHO  [facility] 005 A [facility]      Patient is a 82y old  Female who presents with a chief complaint of back pain (22 Jan 2023 09:15)        Interval events:  Patient seen and examined at bedside. She said around 2AM she started having a "different, new" sharp pain under right rib, 8/10, "tearing", radiating to back. On exam this morning, she still was having the pain. EKG no changes, troponin negative. I ordered STAT CTA chest/abd to r/o dissection, still not done at this time despite multiple alerts to radiology. Checked on patient multiple times throughout day, she is now comfortably sleeping.     -PMHx: Hypothyroid    Hyperlipemia    Brain aneurysm    Compression fracture of spine    Presence of IVC filter      -PSHx:S/P appendectomy    S/P coil embolization of cerebral aneurysm            REVIEW OF SYSTEMS:  CONSTITUTIONAL: No fever, weight loss, or fatigue  RESPIRATORY: No cough, wheezing, chills or hemoptysis; No shortness of breath  CARDIOVASCULAR: as above   GASTROINTESTINAL: No abdominal or epigastric pain. No nausea, vomiting, or hematemesis; No diarrhea or constipation. No melena or hematochezia.  NEUROLOGICAL: No headaches  LYMPH NODES: No enlarged glands  MUSCULOSKELETAL: as above       T(C): , Max: 36.8 (01-26-23 @ 16:16)  HR: 88 (01-27-23 @ 11:13)  BP: 117/75 (01-27-23 @ 11:13)  RR: 18 (01-27-23 @ 11:13)  SpO2: 98% (01-27-23 @ 11:13)  CAPILLARY BLOOD GLUCOSE          PHYSICAL EXAM:  GENERAL: NAD, obese, lying in bed comfortably, on 2L NC  HEAD:  Atraumatic, Normocephalic  EYES: EOMI, PERRLA, conjunctiva and sclera clear  ENT: Moist mucous membranes  NECK: Supple, No JVD     CHEST/LUNG: CTABL; tender to palpation on front of right side of chest on 6th rib  HEART: Regular rate and rhythm; No murmurs, rubs, or gallops  ABDOMEN: Bowel sounds present; Soft, Nontender, Nondistended. No hepatomegaly  EXTREMITIES:  2+ Peripheral Pulses, brisk capillary refill. 2+ b/l pitting edema LE's   NERVOUS SYSTEM:  Alert & Oriented X3, speech clear. No deficits   MSK: FROM all 4 extremities, full and equal strength  SKIN: No rashes or lesions    Spoke with consultants: Y[x ] N [ ]       LABS:          10.8  8.21  )-------(248          34.4  N=60.4  L=22.7  MCV=97.2    140|96<L>|9<L>  ------------------<91  3.4<L>|33<H>|0.7  eGFR:--  Ca:8.7            Microbiology:      RADIOLOGY & ADDITIONAL TESTS:  < from: 12 Lead ECG (01.27.23 @ 02:52) >  Diagnosis Line Normal sinus rhythm  Left axis deviation    Possible Anterior infarct , age undetermined  Abnormal ECG    < end of copied text >    < from: TTE Echo Complete w/o Contrast w/ Doppler (01.26.23 @ 12:48) >  Summary:   1. Left ventricular ejection fraction, by visual estimation, is 55 to   60%.   2. Normal global left ventricular systolic function.   3. Spectral Doppler shows impaired relaxation pattern of left   ventricular myocardial filling (Grade I diastolic dysfunction).   4. Mildly enlarged left atrium.   5. Normal right atrial size.   6. Trace mitral valve regurgitation.   7. Mild tricuspid regurgitation.   8. Estimated pulmonary artery systolic pressure is 37.9 mmHg assuming a   right atrial pressure of 8 mmHg, which is consistent with borderline   pulmonary hypertension.    < end of copied text >          Medications:  acetaminophen     Tablet .. 650 milliGRAM(s) Oral every 6 hours PRN  albuterol    90 MICROgram(s) HFA Inhaler 2 Puff(s) Inhalation every 6 hours PRN  amLODIPine   Tablet 2.5 milliGRAM(s) Oral daily  apixaban 10 milliGRAM(s) Oral every 12 hours  atorvastatin 20 milliGRAM(s) Oral at bedtime  benzonatate 100 milliGRAM(s) Oral every 8 hours  budesonide 160 MICROgram(s)/formoterol 4.5 MICROgram(s) Inhaler 2 Puff(s) Inhalation two times a day  furosemide    Tablet 20 milliGRAM(s) Oral daily  gabapentin 300 milliGRAM(s) Oral three times a day  HYDROmorphone   Tablet 4 milliGRAM(s) Oral every 4 hours PRN  levothyroxine 25 MICROGram(s) Oral daily  lidocaine   4% Patch 1 Patch Transdermal daily  pantoprazole    Tablet 40 milliGRAM(s) Oral before breakfast  polyethylene glycol 3350 17 Gram(s) Oral daily  senna 2 Tablet(s) Oral at bedtime  sodium chloride 0.65% Nasal 1 Spray(s) Both Nostrils four times a day PRN  tiotropium 2.5 MICROgram(s) Inhaler 2 Puff(s) Inhalation daily

## 2023-01-27 NOTE — CHART NOTE - NSCHARTNOTEFT_GEN_A_CORE
Neuroendovascular team consulted 1/24 for evaluation of known aneurysm for AC clearance.   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 Helm in 2010, lost to follow up. Now with extensive DVT and PE on this admission.   MRA on this admission without evidence of recurrent aneurysm.     < from: MR Angio Head No Cont (01.26.23 @ 11:38) >  IMPRESSION:  Embolic coil mass in the right paraclinoid region is noted without   evidence of recurrent aneurysm.  < end of copied text >    Per Dr. Montesinos, ok to d/c on anticoagulation for DVT/PE treatment. Will establish as outpatient for aneurysm follow up in the neurosurgery clinic with Dr. Montesinos 1-2 months.   [phone]

## 2023-01-27 NOTE — CONSULT NOTE ADULT - SUBJECTIVE AND OBJECTIVE BOX
GENERAL SURGERY CONSULT NOTE    Patient: WILFREDO CHO , 82y ([birth year])Female   MRN: [id number]  Location: [address]  Visit: 01-16-23 Inpatient  Date: 01-27-23 @ 23:08    HPI:  82 year old female with past medical history of DLD, ILD on 2-3L home O2, compression fx thoracic spine, IVC filter, and hypothyroidism presents after recent discharge for shortness of breath and back pain. Patient lives at home alone, and is unable to care for herself. She had a visiting nurse service once since discharge who agreed the patient is unsafe to be living at home. Patient was found to be covid positive upon admission. She states that she has a slight sore throat and had one loose BM today, but does not feel any more short of breath than she she has been since last admission. Patient also states that over the last month, she has been having swelling of both lower legs. She denies chest pain/nausea/vomiting/ abdominal pain. Her back pain has been controlled on 20mg oxycodone.  (16 Jan 2023 16:43)    PAST MEDICAL & SURGICAL HISTORY:  Hypothyroid  Hyperlipemia  Brain aneurysm  Compression fracture of spine  Presence of IVC filter  S/P appendectomy  S/P coil embolization of cerebral aneurysm    Surgery consulted to evaluate right anterior chest hematoma. Last night, patient complained of sharp right sided chest pain radiating to back. STAT CTA performed CTA findings consistent with right properitoneal hematoma. Patient recently on therapeutic LVX for treatment of RLE DVTs and PE. Surgery consulted regarding hematoma management.  Seems to have spontaneously developed while on therapeutic AC.    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 Surgery consulted to evaluate right anterior chest hematoma.    Home Medications:  acetaminophen 325 mg oral tablet: 2 tab(s) orally every 6 hours (16 Jan 2023 12:06)  ALBUTEROL HFA 90 MCG INHALER:  (16 Jan 2023 12:06)  AMLODIPINE BESYLATE 2.5 MG TAB:  (16 Jan 2023 12:06)  ATORVASTATIN 10 MG TABLET: tab(s) orally once a day (at bedtime) (16 Jan 2023 12:06)  guaifenesin-dextromethorphan 100 mg-10 mg/5 mL oral liquid: 10 milliliter(s) orally every 6 hours, As needed, Cough (16 Jan 2023 12:06)  LEVOTHYROXINE 25 MCG TABLET: tab(s) orally once a day (16 Jan 2023 12:06)  lidocaine 4% topical film: Apply topically to affected area once a day (16 Jan 2023 12:06)  polyethylene glycol 3350 oral powder for reconstitution: 17 gram(s) orally once a day (16 Jan 2023 12:06)  senna leaf extract oral tablet: 2 tab(s) orally once a day (at bedtime) (16 Jan 2023 12:06)    VITALS:  T(F): 97.6 (01-27-23 @ 16:36), Max: 98 (01-27-23 @ 11:13)  HR: 91 (01-27-23 @ 16:36) (83 - 97)  BP: 117/75 (01-27-23 @ 11:13) (109/71 - 131/62)  RR: 18 (01-27-23 @ 11:13) (18 - 18)  SpO2: 98% (01-27-23 @ 11:13) (96% - 99%)    PHYSICAL EXAM:  General: AAOx3, obese  HEENT: Normocephalic, atraumatic, trachea midline  Heart: s1, s2,  tachycardia  Lungs: mild increased work of breathing. Symmetric chest wall rise and fall. Bilateral breath sounds  Abdomen:  Soft, nontender, nondistended. Obese  MSK/Extremities: Warm & well-perfused.   Skin: Warm, dry. No jaundice.   Vascular: warm, with pitting edema of the bilateral lower extremities, RLEx with erythema to the anterior leg. compartments soft.   Neurologic: Non-focal  Psychiatry: Mood & affect appropriate    MEDICATIONS  (STANDING):  amLODIPine   Tablet 2.5 milliGRAM(s) Oral daily  apixaban 10 milliGRAM(s) Oral every 12 hours  atorvastatin 20 milliGRAM(s) Oral at bedtime  benzonatate 100 milliGRAM(s) Oral every 8 hours  budesonide 160 MICROgram(s)/formoterol 4.5 MICROgram(s) Inhaler 2 Puff(s) Inhalation two times a day  furosemide    Tablet 20 milliGRAM(s) Oral daily  gabapentin 300 milliGRAM(s) Oral three times a day  levothyroxine 25 MICROGram(s) Oral daily  lidocaine   4% Patch 1 Patch Transdermal daily  lidocaine   4% Patch 1 Patch Transdermal daily  pantoprazole    Tablet 40 milliGRAM(s) Oral before breakfast  polyethylene glycol 3350 17 Gram(s) Oral daily  senna 2 Tablet(s) Oral at bedtime  tiotropium 2.5 MICROgram(s) Inhaler 2 Puff(s) Inhalation daily    MEDICATIONS  (PRN):  acetaminophen     Tablet .. 650 milliGRAM(s) Oral every 6 hours PRN Temp greater or equal to 38C (100.4F), Mild Pain (1 - 3)  albuterol    90 MICROgram(s) HFA Inhaler 2 Puff(s) Inhalation every 6 hours PRN Bronchospasm  HYDROmorphone   Tablet 4 milliGRAM(s) Oral every 4 hours PRN Severe Pain (7 - 10)  sodium chloride 0.65% Nasal 1 Spray(s) Both Nostrils four times a day PRN Nasal Congestion      LAB/STUDIES:                     10.8   8.21  )-----------( 248      ( 27 Jan 2023 06:36 )             34.4     01-27  140  |  96<L>  |  9<L>  ----------------------------<  91  3.4<L>   |  33<H>  |  0.7    Ca    8.7      27 Jan 2023 06:36  Mg     1.9     01-27    TPro  5.3<L>  /  Alb  3.1<L>  /  TBili  0.9  /  DBili  x   /  AST  18  /  ALT  10  /  AlkPhos  120<H>  01-27    LIVER FUNCTIONS - ( 27 Jan 2023 06:36 )  Alb: 3.1 g/dL / Pro: 5.3 g/dL / ALK PHOS: 120 U/L / ALT: 10 U/L / AST: 18 U/L / GGT: x           CARDIAC MARKERS ( 27 Jan 2023 06:36 )  x     / <0.01 ng/mL / x     / x     / x          IMAGING:  < from: CT Angio Abdomen and Pelvis w/ IV Cont (01.27.23 @ 16:33) >  IMPRESSION:  No evidence of thoracic or abdominal aortic aneurysm or dissection.    There is a crescent shaped hyperdense collection (50 Hounsfield units)   deep to the musculature of the right sided abdominal wall, anterior to   the right 10th rib, and lateral to the retroperitoneal fat. Finding is   compatible with a focal hematoma in the right properitoneal space.

## 2023-01-27 NOTE — CONSULT NOTE ADULT - ATTENDING COMMENTS
I personally reviewed the venous duplex- there is DVT in right femoral and popliteal and left popliteal. Though it might not be acute, patient has PE despite IVC filter,  Would recommend therapeutic anticoagulation.
ACS Attending Note Attestation    Patient is examined and evaluated at the bedside with the residents/PAs. Treatment plan discussed with the team, nurses, and consulting physicians and consulting teams. Medications, radiological studies and all other relevant studies reviewed. I reviewed the resident/PA note and agreed with above assessment and plan with following additions and corrections.    WILFREDO CHO Patient is a 82y old  Female who presents with a chief complaint of back pain, referred for evaluation abdominal wall hematoma    Allergies  Compazine (Unknown)  Intolerances    PAST MEDICAL & SURGICAL HISTORY:  Hypothyroid  Hyperlipemia  Brain aneurysm  Compression fracture of spine  Presence of IVC filter  S/P appendectomy  S/P coil embolization of cerebral aneurysm    Vital Signs Last 24 Hrs  T(C): 36.4 (27 Jan 2023 16:36), Max: 36.7 (27 Jan 2023 11:13)  T(F): 97.6 (27 Jan 2023 16:36), Max: 98 (27 Jan 2023 11:13)  HR: 91 (27 Jan 2023 16:36) (83 - 97)  BP: 117/75 (27 Jan 2023 11:13) (109/71 - 131/62)  BP(mean): --  RR: 18 (27 Jan 2023 11:13) (18 - 18)  SpO2: 98% (27 Jan 2023 11:13) (96% - 99%)    Parameters below as of 27 Jan 2023 11:13  Patient On (Oxygen Delivery Method): room air                            10.8   8.21  )-----------( 248      ( 27 Jan 2023 06:36 )             34.4     01-27    140  |  96<L>  |  9<L>  ----------------------------<  91  3.4<L>   |  33<H>  |  0.7    Ca    8.7      27 Jan 2023 06:36  Mg     1.9     01-27    TPro  5.3<L>  /  Alb  3.1<L>  /  TBili  0.9  /  DBili  x   /  AST  18  /  ALT  10  /  AlkPhos  120<H>  01-27      Diagnosis: abdominal wall spontaneous hematoma    Plan:	  - abdominal binder  - IR if Hg/Hct drops due to expansion of the hematoma   - supportive care  - GI/DVT prophylaxis  - pain management  - follow up consults  - repeat studies as needed    Akosua Adame MD, FACS  Trauma/ACS/Surcical Critical care Attending

## 2023-01-27 NOTE — PROVIDER CONTACT NOTE (OTHER) - SITUATION
Patient states "I feel like something is sitting on my chest." Pt denies any pain. /71 hr-97 pulse ox-96% on 2LNC

## 2023-01-27 NOTE — CONSULT NOTE ADULT - ASSESSMENT
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  Surgery consulted to evaluate right anterior chest hematoma. Physical exam findings, imaging, and labs as documented above.     PLAN:  - defer to medicine/vascular/neuro re: anticoagulation risks vs benefits given P.E. despite IVC filter  - recommendations for hematoma are non-operative and conservative, warm compresses, pain control PRN  - if patient develops sudden drop in hgb or becomes hemodynamically unstable, first step would be IR evaluation for embolization   - trend hgb  - monitor vitals     Above plan discussed with Attending Surgeon Dr. Adame  01-27-23 @ 23:08    [employer] #[id number]

## 2023-01-28 LAB
ALBUMIN SERPL ELPH-MCNC: 3.5 G/DL — SIGNIFICANT CHANGE UP (ref 3.5–5.2)
ALP SERPL-CCNC: 132 U/L — HIGH (ref 30–115)
ALT FLD-CCNC: 14 U/L — SIGNIFICANT CHANGE UP (ref 0–41)
ANION GAP SERPL CALC-SCNC: 9 MMOL/L — SIGNIFICANT CHANGE UP (ref 7–14)
AST SERPL-CCNC: 27 U/L — SIGNIFICANT CHANGE UP (ref 0–41)
BASOPHILS # BLD AUTO: 0.08 K/UL — SIGNIFICANT CHANGE UP (ref 0–0.2)
BASOPHILS NFR BLD AUTO: 0.9 % — SIGNIFICANT CHANGE UP (ref 0–1)
BILIRUB SERPL-MCNC: 0.9 MG/DL — SIGNIFICANT CHANGE UP (ref 0.2–1.2)
BUN SERPL-MCNC: 8 MG/DL — LOW (ref 10–20)
CALCIUM SERPL-MCNC: 8.8 MG/DL — SIGNIFICANT CHANGE UP (ref 8.4–10.4)
CHLORIDE SERPL-SCNC: 95 MMOL/L — LOW (ref 98–110)
CO2 SERPL-SCNC: 34 MMOL/L — HIGH (ref 17–32)
CREAT SERPL-MCNC: 0.7 MG/DL — SIGNIFICANT CHANGE UP (ref 0.7–1.5)
EGFR: 87 ML/MIN/1.73M2 — SIGNIFICANT CHANGE UP
EOSINOPHIL # BLD AUTO: 0.19 K/UL — SIGNIFICANT CHANGE UP (ref 0–0.7)
EOSINOPHIL NFR BLD AUTO: 2.1 % — SIGNIFICANT CHANGE UP (ref 0–8)
GLUCOSE SERPL-MCNC: 109 MG/DL — HIGH (ref 70–99)
HCT VFR BLD CALC: 36.1 % — LOW (ref 37–47)
HGB BLD-MCNC: 11.3 G/DL — LOW (ref 12–16)
IMM GRANULOCYTES NFR BLD AUTO: 0.6 % — HIGH (ref 0.1–0.3)
LYMPHOCYTES # BLD AUTO: 1.59 K/UL — SIGNIFICANT CHANGE UP (ref 1.2–3.4)
LYMPHOCYTES # BLD AUTO: 18 % — LOW (ref 20.5–51.1)
MAGNESIUM SERPL-MCNC: 1.9 MG/DL — SIGNIFICANT CHANGE UP (ref 1.8–2.4)
MCHC RBC-ENTMCNC: 30.3 PG — SIGNIFICANT CHANGE UP (ref 27–31)
MCHC RBC-ENTMCNC: 31.3 G/DL — LOW (ref 32–37)
MCV RBC AUTO: 96.8 FL — SIGNIFICANT CHANGE UP (ref 81–99)
MONOCYTES # BLD AUTO: 1.09 K/UL — HIGH (ref 0.1–0.6)
MONOCYTES NFR BLD AUTO: 12.3 % — HIGH (ref 1.7–9.3)
NEUTROPHILS # BLD AUTO: 5.84 K/UL — SIGNIFICANT CHANGE UP (ref 1.4–6.5)
NEUTROPHILS NFR BLD AUTO: 66.1 % — SIGNIFICANT CHANGE UP (ref 42.2–75.2)
NRBC # BLD: 0 /100 WBCS — SIGNIFICANT CHANGE UP (ref 0–0)
PLATELET # BLD AUTO: 291 K/UL — SIGNIFICANT CHANGE UP (ref 130–400)
POTASSIUM SERPL-MCNC: 3.3 MMOL/L — LOW (ref 3.5–5)
POTASSIUM SERPL-SCNC: 3.3 MMOL/L — LOW (ref 3.5–5)
PROT SERPL-MCNC: 6 G/DL — SIGNIFICANT CHANGE UP (ref 6–8)
RBC # BLD: 3.73 M/UL — LOW (ref 4.2–5.4)
RBC # FLD: 13.8 % — SIGNIFICANT CHANGE UP (ref 11.5–14.5)
SODIUM SERPL-SCNC: 138 MMOL/L — SIGNIFICANT CHANGE UP (ref 135–146)
WBC # BLD: 8.84 K/UL — SIGNIFICANT CHANGE UP (ref 4.8–10.8)
WBC # FLD AUTO: 8.84 K/UL — SIGNIFICANT CHANGE UP (ref 4.8–10.8)

## 2023-01-28 PROCEDURE — 99233 SBSQ HOSP IP/OBS HIGH 50: CPT

## 2023-01-28 RX ORDER — HYDROMORPHONE HYDROCHLORIDE 2 MG/ML
2 INJECTION INTRAMUSCULAR; INTRAVENOUS; SUBCUTANEOUS EVERY 4 HOURS
Refills: 0 | Status: DISCONTINUED | OUTPATIENT
Start: 2023-01-28 | End: 2023-02-04

## 2023-01-28 RX ORDER — POTASSIUM CHLORIDE 20 MEQ
40 PACKET (EA) ORAL ONCE
Refills: 0 | Status: COMPLETED | OUTPATIENT
Start: 2023-01-28 | End: 2023-01-28

## 2023-01-28 RX ADMIN — LIDOCAINE 1 PATCH: 4 CREAM TOPICAL at 11:10

## 2023-01-28 RX ADMIN — APIXABAN 10 MILLIGRAM(S): 2.5 TABLET, FILM COATED ORAL at 05:43

## 2023-01-28 RX ADMIN — APIXABAN 10 MILLIGRAM(S): 2.5 TABLET, FILM COATED ORAL at 17:13

## 2023-01-28 RX ADMIN — LIDOCAINE 1 PATCH: 4 CREAM TOPICAL at 11:20

## 2023-01-28 RX ADMIN — ATORVASTATIN CALCIUM 20 MILLIGRAM(S): 80 TABLET, FILM COATED ORAL at 21:38

## 2023-01-28 RX ADMIN — GABAPENTIN 300 MILLIGRAM(S): 400 CAPSULE ORAL at 05:44

## 2023-01-28 RX ADMIN — GABAPENTIN 300 MILLIGRAM(S): 400 CAPSULE ORAL at 13:39

## 2023-01-28 RX ADMIN — AMLODIPINE BESYLATE 2.5 MILLIGRAM(S): 2.5 TABLET ORAL at 05:44

## 2023-01-28 RX ADMIN — BUDESONIDE AND FORMOTEROL FUMARATE DIHYDRATE 2 PUFF(S): 160; 4.5 AEROSOL RESPIRATORY (INHALATION) at 20:46

## 2023-01-28 RX ADMIN — Medication 25 MICROGRAM(S): at 05:44

## 2023-01-28 RX ADMIN — Medication 20 MILLIGRAM(S): at 05:53

## 2023-01-28 RX ADMIN — POLYETHYLENE GLYCOL 3350 17 GRAM(S): 17 POWDER, FOR SOLUTION ORAL at 11:24

## 2023-01-28 RX ADMIN — HYDROMORPHONE HYDROCHLORIDE 2 MILLIGRAM(S): 2 INJECTION INTRAMUSCULAR; INTRAVENOUS; SUBCUTANEOUS at 20:24

## 2023-01-28 RX ADMIN — HYDROMORPHONE HYDROCHLORIDE 4 MILLIGRAM(S): 2 INJECTION INTRAMUSCULAR; INTRAVENOUS; SUBCUTANEOUS at 04:06

## 2023-01-28 RX ADMIN — PANTOPRAZOLE SODIUM 40 MILLIGRAM(S): 20 TABLET, DELAYED RELEASE ORAL at 05:44

## 2023-01-28 RX ADMIN — HYDROMORPHONE HYDROCHLORIDE 2 MILLIGRAM(S): 2 INJECTION INTRAMUSCULAR; INTRAVENOUS; SUBCUTANEOUS at 21:30

## 2023-01-28 RX ADMIN — HYDROMORPHONE HYDROCHLORIDE 4 MILLIGRAM(S): 2 INJECTION INTRAMUSCULAR; INTRAVENOUS; SUBCUTANEOUS at 05:00

## 2023-01-28 RX ADMIN — GABAPENTIN 300 MILLIGRAM(S): 400 CAPSULE ORAL at 21:38

## 2023-01-28 RX ADMIN — HYDROMORPHONE HYDROCHLORIDE 4 MILLIGRAM(S): 2 INJECTION INTRAMUSCULAR; INTRAVENOUS; SUBCUTANEOUS at 00:45

## 2023-01-28 RX ADMIN — BUDESONIDE AND FORMOTEROL FUMARATE DIHYDRATE 2 PUFF(S): 160; 4.5 AEROSOL RESPIRATORY (INHALATION) at 11:26

## 2023-01-28 RX ADMIN — Medication 40 MILLIEQUIVALENT(S): at 20:38

## 2023-01-28 NOTE — PROGRESS NOTE ADULT - SUBJECTIVE AND OBJECTIVE BOX
WILFREDO CHO 82y Female  MRN#: [id number]   Hospital Day: 12d    SUBJECTIVE  Patient is a 82y old Female who presents with a chief complaint of back pain (22 Jan 2023 09:15)  Currently admitted to medicine with the primary diagnosis of 2019 novel coronavirus disease (COVID-19)      INTERVAL HPI AND OVERNIGHT EVENTS:  Patient was examined and seen at bedside. This morning she is resting comfortably in bed and reports no issues or overnight events.    OBJECTIVE  PAST MEDICAL & SURGICAL HISTORY  Hypothyroid    Hyperlipemia    Brain aneurysm    Compression fracture of spine    Presence of IVC filter    S/P appendectomy    S/P coil embolization of cerebral aneurysm      ALLERGIES:  Compazine (Unknown)    MEDICATIONS:  STANDING MEDICATIONS  amLODIPine   Tablet 2.5 milliGRAM(s) Oral daily  apixaban 10 milliGRAM(s) Oral every 12 hours  atorvastatin 20 milliGRAM(s) Oral at bedtime  benzonatate 100 milliGRAM(s) Oral every 8 hours  budesonide 160 MICROgram(s)/formoterol 4.5 MICROgram(s) Inhaler 2 Puff(s) Inhalation two times a day  furosemide    Tablet 20 milliGRAM(s) Oral daily  gabapentin 300 milliGRAM(s) Oral three times a day  levothyroxine 25 MICROGram(s) Oral daily  lidocaine   4% Patch 1 Patch Transdermal daily  lidocaine   4% Patch 1 Patch Transdermal daily  pantoprazole    Tablet 40 milliGRAM(s) Oral before breakfast  polyethylene glycol 3350 17 Gram(s) Oral daily  potassium chloride   Powder 40 milliEquivalent(s) Oral once  senna 2 Tablet(s) Oral at bedtime  tiotropium 2.5 MICROgram(s) Inhaler 2 Puff(s) Inhalation daily    PRN MEDICATIONS  acetaminophen     Tablet .. 650 milliGRAM(s) Oral every 6 hours PRN  albuterol    90 MICROgram(s) HFA Inhaler 2 Puff(s) Inhalation every 6 hours PRN  HYDROmorphone   Tablet 2 milliGRAM(s) Oral every 4 hours PRN  sodium chloride 0.65% Nasal 1 Spray(s) Both Nostrils four times a day PRN      Vitals:  VITAL SIGNS: Last 24 Hours  T(C): 36 (28 Jan 2023 09:00), Max: 36.8 (28 Jan 2023 05:21)  T(F): 96.8 (28 Jan 2023 09:00), Max: 98.3 (28 Jan 2023 05:21)  HR: 93 (28 Jan 2023 09:00) (89 - 98)  BP: 107/74 (28 Jan 2023 09:00) (102/68 - 107/74)  BP(mean): --  RR: 18 (28 Jan 2023 09:00) (18 - 18)  SpO2: 97% (28 Jan 2023 09:00) (97% - 97%)  Orthostatic Vitals:  Orthostatic VS    I's&O's:  I&O's Summary    27 Jan 2023 07:01  -  28 Jan 2023 07:00  --------------------------------------------------------  IN: 0 mL / OUT: 750 mL / NET: -750 mL    28 Jan 2023 07:01  -  28 Jan 2023 19:31  --------------------------------------------------------  IN: 0 mL / OUT: 600 mL / NET: -600 mL      PHYSICAL EXAM:  GENERAL: NAD, obese, lying in bed comfortably, on 2L NC  HEAD:  Atraumatic, Normocephalic  EYES: EOMI, PERRLA, conjunctiva and sclera clear  ENT: Moist mucous membranes  NECK: Supple, No JVD     CHEST/LUNG: CTABL; tender to palpation on front of right side of chest on 10th rib  HEART: Regular rate and rhythm; No murmurs, rubs, or gallops  ABDOMEN: Bowel sounds present; Soft, Nontender, Nondistended. No hepatomegaly  EXTREMITIES:  2+ Peripheral Pulses, brisk capillary refill. 2+ b/l pitting edema LE's   NERVOUS SYSTEM:  Alert & Oriented X3, slightly lethargic, speech clear. No deficits   MSK: FROM all 4 extremities, full and equal strength  SKIN: No rashes or lesions        CBC:                        11.3   8.84  )-----------( 291      ( 28 Jan 2023 05:41 )             36.1     CMP:  01-28    138  |  95<L>  |  8<L>  ----------------------------<  109<H>  3.3<L>   |  34<H>  |  0.7    Ca    8.8      28 Jan 2023 05:41  Mg     1.9     01-28    TPro  6.0  /  Alb  3.5  /  TBili  0.9  /  DBili  x   /  AST  27  /  ALT  14  /  AlkPhos  132<H>  01-28    POCS:  CAPILLARY BLOOD GLUCOSE                  CARDIAC MARKERS ( 27 Jan 2023 06:36 )  x     / <0.01 ng/mL / x     / x     / x          LIVER FUNCTIONS - ( 28 Jan 2023 05:41 )  Alb: 3.5 g/dL / Pro: 6.0 g/dL / ALK PHOS: 132 U/L / ALT: 14 U/L / AST: 27 U/L / GGT: x           COVID-19 PCR: Detected (21 Jan 2023 12:49)  COVID-19 PCR: NotDetec (07 Jan 2023 10:00)  COVID-19 PCR: NotDetec (04 Jan 2023 10:09)  SARS-CoV-2: NotDetec (28 Dec 2022 10:50)

## 2023-01-28 NOTE — PROGRESS NOTE ADULT - ASSESSMENT
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  Found in the hospital to have COVID.  Transferred here from [facility] for neurosurgery regarding compression fracture, found here to have extensive DVT.  Patient has an IVC filter, following up neuroendovascular surgery on whether patient can have AC.     #Acute hypoxemic respiratory failure  #COVID19 s/p RDV   -Left midlung bandlike opacity. Lung volumes. No focal consolidation otherwise. No pneumothorax or pleural effusion.  -2-3L home O2, back on baseline requirement  -s/p RDV, she was given one dose of decadron at the time, was not continued  -ECHO reviewed     #Recurrent DVT  #New PE, no right heart strain   -per vascular, no need for thrombectomy   - has an IVC filter, couldn't be on AC in the past because of aneurysm, per neuroendovascular can restart a/c  -now on LVX 80mg sq bid; spoke with neuroendovascular, can do full dose a/c with Eliquis on d/c   -MRA brain no bleeding    #Focal hematoma in the right properitoneal space  -per vascular: benefit of anticoagulation supersedes risk. Would recommend restarting anticoagulation. Patient's Hgb actively stable, however, please continue to monitor  -per surgery: abdominal binder, IR if Hg/Hct drops due to expansion of the hematoma, supportive care    # Severe Thoracic compression fracture T12 complicated by worsening severe back pain, limited ROM, difficulty with ambulation due to pain 2/2 severe compression fx  -MRI T-spine: multilevel compression deformities of the thoracic spine  -Neurosurgery was planning on intervention, however DVT was found  - Pain management eval appreciated: dilaudid PRN, gabapentin, toradol PRN for breakthrough pain  - TLSO brace and PT eval  - CM/social work  -per neurosurgery, can do PT/OT/Rehab, if can walk then can discharge and f/u o/p; if not, then will need to do kyphoplasty - looks like patient did ok with both PT and rehab; will start d/c planning     #Obesity BMI 35  - wt loss per PCP    #Hepatic cyst  - f/u OP     #Hx/o cerebral aneurysm s/p coiling   - no new deficits.     #Hypothyroidism  - cont synthroid    #h/o HTN   - c/w amlodipine     #Hypokalemia  -replete     #h/o DLD   - c/w statin     Diet - DASH   DVT ppx: LVX 80mg sq bid     Dispo: d/c planning to SNF; likely Monday.     Family: Spoke with daughter, Solange, over phone, answered all questions and updated on patient's condition.

## 2023-01-28 NOTE — PROGRESS NOTE ADULT - SUBJECTIVE AND OBJECTIVE BOX
WILFREDO CHO  [facility] [address])      Patient is a 82y old  Female who presents with a chief complaint of back pain (22 Jan 2023 09:15)        Interval events:  Patient seen and examined at bedside. Found to have small properitoneal hematoma, surgery and vascular consulted, no intervention, just to monitor Hb which is improved today. Says pain is improved from yesterday, but is a little lethargic today.     -PMHx: Hypothyroid    Hyperlipemia    Brain aneurysm    Compression fracture of spine    Presence of IVC filter      -PSHx:S/P appendectomy    S/P coil embolization of cerebral aneurysm            REVIEW OF SYSTEMS:  CONSTITUTIONAL: No fever, weight loss, or fatigue  RESPIRATORY: No cough, wheezing, chills or hemoptysis; No shortness of breath  CARDIOVASCULAR: No chest pain, palpitations, dizziness, or leg swelling  GASTROINTESTINAL: No abdominal or epigastric pain. No nausea, vomiting, or hematemesis; No diarrhea or constipation. No melena or hematochezia.  NEUROLOGICAL: No headaches  LYMPH NODES: No enlarged glands  MUSCULOSKELETAL: as above       T(C): , Max: 36.8 (01-28-23 @ 05:21)  HR: 93 (01-28-23 @ 09:00)  BP: 107/74 (01-28-23 @ 09:00)  RR: 18 (01-28-23 @ 09:00)  SpO2: 97% (01-28-23 @ 09:00)  CAPILLARY BLOOD GLUCOSE            PHYSICAL EXAM:  GENERAL: NAD, obese, lying in bed comfortably, on 2L NC  HEAD:  Atraumatic, Normocephalic  EYES: EOMI, PERRLA, conjunctiva and sclera clear  ENT: Moist mucous membranes  NECK: Supple, No JVD     CHEST/LUNG: CTABL; tender to palpation on front of right side of chest on 10th rib  HEART: Regular rate and rhythm; No murmurs, rubs, or gallops  ABDOMEN: Bowel sounds present; Soft, Nontender, Nondistended. No hepatomegaly  EXTREMITIES:  2+ Peripheral Pulses, brisk capillary refill. 2+ b/l pitting edema LE's   NERVOUS SYSTEM:  Alert & Oriented X3, slightly lethargic, speech clear. No deficits   MSK: FROM all 4 extremities, full and equal strength  SKIN: No rashes or lesions      Spoke with consultants: Y[x ] N [ ]       LABS:          11.3  8.84  )-------(291          36.1  N=66.1  L=18.0  MCV=96.8    138|95<L>|8<L>  ------------------<109<H>  3.3<L>|34<H>|0.7  eGFR:--  Ca:8.8            Microbiology:      RADIOLOGY & ADDITIONAL TESTS:  < from: CT Angio Abdomen and Pelvis w/ IV Cont (01.27.23 @ 16:33) >  IMPRESSION:    No evidence of thoracic or abdominal aortic aneurysm or dissection.    There is a crescent shaped hyperdense collection (50 Hounsfield units)   deep to the musculature of the right sided abdominal wall, anterior to   the right 10th rib, and lateral to the retroperitoneal fat. Finding is   compatible with a focal hematoma in the right properitoneal space (8/86;   9/256)    < end of copied text >        Medications:  acetaminophen     Tablet .. 650 milliGRAM(s) Oral every 6 hours PRN  albuterol    90 MICROgram(s) HFA Inhaler 2 Puff(s) Inhalation every 6 hours PRN  amLODIPine   Tablet 2.5 milliGRAM(s) Oral daily  apixaban 10 milliGRAM(s) Oral every 12 hours  atorvastatin 20 milliGRAM(s) Oral at bedtime  benzonatate 100 milliGRAM(s) Oral every 8 hours  budesonide 160 MICROgram(s)/formoterol 4.5 MICROgram(s) Inhaler 2 Puff(s) Inhalation two times a day  furosemide    Tablet 20 milliGRAM(s) Oral daily  gabapentin 300 milliGRAM(s) Oral three times a day  HYDROmorphone   Tablet 4 milliGRAM(s) Oral every 4 hours PRN  levothyroxine 25 MICROGram(s) Oral daily  lidocaine   4% Patch 1 Patch Transdermal daily  lidocaine   4% Patch 1 Patch Transdermal daily  pantoprazole    Tablet 40 milliGRAM(s) Oral before breakfast  polyethylene glycol 3350 17 Gram(s) Oral daily  potassium chloride   Powder 40 milliEquivalent(s) Oral once  senna 2 Tablet(s) Oral at bedtime  sodium chloride 0.65% Nasal 1 Spray(s) Both Nostrils four times a day PRN  tiotropium 2.5 MICROgram(s) Inhaler 2 Puff(s) Inhalation daily

## 2023-01-28 NOTE — PROGRESS NOTE ADULT - ASSESSMENT
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  Found in the hospital to have COVID.  Transferred here from [facility] for neurosurgery regarding compression fracture, found here to have extensive DVT.  Patient has an IVC filter, following up neuroendovascular surgery on whether patient can have AC.     #Acute hypoxemic respiratory failure  #COVID19 s/p RDV   -Left midlung bandlike opacity. Lung volumes. No focal consolidation otherwise. No pneumothorax or pleural effusion.  -2-3L home O2, back on baseline requirement  -s/p RDV, she was given one dose of decadron at the time, was not continued  -ECHO reviewed     #Recurrent DVT  #New PE, no right heart strain   -per vascular, no need for thrombectomy   - has an IVC filter, couldn't be on AC in the past because of aneurysm, per neuroendovascular can restart a/c  -now on LVX 80mg sq bid; spoke with neuroendovascular, can do full dose a/c with Eliquis on d/c   -MRA brain no bleeding    #Focal hematoma in the right properitoneal space  -per vascular: benefit of anticoagulation supersedes risk. Would recommend restarting anticoagulation. Patient's Hgb actively stable, however, please continue to monitor  -per surgery: abdominal binder, IR if Hg/Hct drops due to expansion of the hematoma, supportive care    # Severe Thoracic compression fracture T12 complicated by worsening severe back pain, limited ROM, difficulty with ambulation due to pain 2/2 severe compression fx  -MRI T-spine: multilevel compression deformities of the thoracic spine  -Neurosurgery was planning on intervention, however DVT was found  - Pain management eval appreciated: dilaudid PRN, gabapentin, toradol PRN for breakthrough pain  - TLSO brace and PT eval  - CM/social work  -per neurosurgery, can do PT/OT/Rehab, if can walk then can discharge and f/u o/p; if not, then will need to do kyphoplasty - looks like patient did ok with both PT and rehab; will start d/c planning     #Obesity BMI 35  - wt loss per PCP    #Hepatic cyst  - f/u OP     #Hx/o cerebral aneurysm s/p coiling   - no new deficits.     #Hypothyroidism  - cont synthroid    #h/o HTN   - c/w amlodipine     #Hypokalemia  -replete     #h/o DLD   - c/w statin     Diet - DASH   DVT ppx: LVX 80mg sq bid     Dispo: d/c planning to SNF; likely Monday.

## 2023-01-29 LAB
ALBUMIN SERPL ELPH-MCNC: 3.3 G/DL — LOW (ref 3.5–5.2)
ALP SERPL-CCNC: 123 U/L — HIGH (ref 30–115)
ALT FLD-CCNC: 20 U/L — SIGNIFICANT CHANGE UP (ref 0–41)
ANION GAP SERPL CALC-SCNC: 13 MMOL/L — SIGNIFICANT CHANGE UP (ref 7–14)
AST SERPL-CCNC: 46 U/L — HIGH (ref 0–41)
BASOPHILS # BLD AUTO: 0.05 K/UL — SIGNIFICANT CHANGE UP (ref 0–0.2)
BASOPHILS NFR BLD AUTO: 0.5 % — SIGNIFICANT CHANGE UP (ref 0–1)
BILIRUB SERPL-MCNC: 0.8 MG/DL — SIGNIFICANT CHANGE UP (ref 0.2–1.2)
BUN SERPL-MCNC: 9 MG/DL — LOW (ref 10–20)
CALCIUM SERPL-MCNC: 9.1 MG/DL — SIGNIFICANT CHANGE UP (ref 8.4–10.5)
CHLORIDE SERPL-SCNC: 93 MMOL/L — LOW (ref 98–110)
CO2 SERPL-SCNC: 28 MMOL/L — SIGNIFICANT CHANGE UP (ref 17–32)
CREAT SERPL-MCNC: 0.8 MG/DL — SIGNIFICANT CHANGE UP (ref 0.7–1.5)
EGFR: 74 ML/MIN/1.73M2 — SIGNIFICANT CHANGE UP
EOSINOPHIL # BLD AUTO: 0.08 K/UL — SIGNIFICANT CHANGE UP (ref 0–0.7)
EOSINOPHIL NFR BLD AUTO: 0.8 % — SIGNIFICANT CHANGE UP (ref 0–8)
GLUCOSE SERPL-MCNC: 95 MG/DL — SIGNIFICANT CHANGE UP (ref 70–99)
HCT VFR BLD CALC: 36.2 % — LOW (ref 37–47)
HGB BLD-MCNC: 11.3 G/DL — LOW (ref 12–16)
IMM GRANULOCYTES NFR BLD AUTO: 0.6 % — HIGH (ref 0.1–0.3)
LYMPHOCYTES # BLD AUTO: 1.88 K/UL — SIGNIFICANT CHANGE UP (ref 1.2–3.4)
LYMPHOCYTES # BLD AUTO: 19.9 % — LOW (ref 20.5–51.1)
MAGNESIUM SERPL-MCNC: 2 MG/DL — SIGNIFICANT CHANGE UP (ref 1.8–2.4)
MCHC RBC-ENTMCNC: 30.1 PG — SIGNIFICANT CHANGE UP (ref 27–31)
MCHC RBC-ENTMCNC: 31.2 G/DL — LOW (ref 32–37)
MCV RBC AUTO: 96.5 FL — SIGNIFICANT CHANGE UP (ref 81–99)
MONOCYTES # BLD AUTO: 1.09 K/UL — HIGH (ref 0.1–0.6)
MONOCYTES NFR BLD AUTO: 11.5 % — HIGH (ref 1.7–9.3)
NEUTROPHILS # BLD AUTO: 6.31 K/UL — SIGNIFICANT CHANGE UP (ref 1.4–6.5)
NEUTROPHILS NFR BLD AUTO: 66.7 % — SIGNIFICANT CHANGE UP (ref 42.2–75.2)
NRBC # BLD: 0 /100 WBCS — SIGNIFICANT CHANGE UP (ref 0–0)
PLATELET # BLD AUTO: 259 K/UL — SIGNIFICANT CHANGE UP (ref 130–400)
POTASSIUM SERPL-MCNC: 3.8 MMOL/L — SIGNIFICANT CHANGE UP (ref 3.5–5)
POTASSIUM SERPL-SCNC: 3.8 MMOL/L — SIGNIFICANT CHANGE UP (ref 3.5–5)
PROT SERPL-MCNC: 5.8 G/DL — LOW (ref 6–8)
RBC # BLD: 3.75 M/UL — LOW (ref 4.2–5.4)
RBC # FLD: 14 % — SIGNIFICANT CHANGE UP (ref 11.5–14.5)
SODIUM SERPL-SCNC: 134 MMOL/L — LOW (ref 135–146)
WBC # BLD: 9.47 K/UL — SIGNIFICANT CHANGE UP (ref 4.8–10.8)
WBC # FLD AUTO: 9.47 K/UL — SIGNIFICANT CHANGE UP (ref 4.8–10.8)

## 2023-01-29 PROCEDURE — 99233 SBSQ HOSP IP/OBS HIGH 50: CPT

## 2023-01-29 RX ADMIN — LIDOCAINE 1 PATCH: 4 CREAM TOPICAL at 21:11

## 2023-01-29 RX ADMIN — HYDROMORPHONE HYDROCHLORIDE 2 MILLIGRAM(S): 2 INJECTION INTRAMUSCULAR; INTRAVENOUS; SUBCUTANEOUS at 10:45

## 2023-01-29 RX ADMIN — HYDROMORPHONE HYDROCHLORIDE 2 MILLIGRAM(S): 2 INJECTION INTRAMUSCULAR; INTRAVENOUS; SUBCUTANEOUS at 22:00

## 2023-01-29 RX ADMIN — LIDOCAINE 1 PATCH: 4 CREAM TOPICAL at 22:54

## 2023-01-29 RX ADMIN — LIDOCAINE 1 PATCH: 4 CREAM TOPICAL at 11:27

## 2023-01-29 RX ADMIN — HYDROMORPHONE HYDROCHLORIDE 2 MILLIGRAM(S): 2 INJECTION INTRAMUSCULAR; INTRAVENOUS; SUBCUTANEOUS at 05:22

## 2023-01-29 RX ADMIN — Medication 20 MILLIGRAM(S): at 05:23

## 2023-01-29 RX ADMIN — AMLODIPINE BESYLATE 2.5 MILLIGRAM(S): 2.5 TABLET ORAL at 05:22

## 2023-01-29 RX ADMIN — HYDROMORPHONE HYDROCHLORIDE 2 MILLIGRAM(S): 2 INJECTION INTRAMUSCULAR; INTRAVENOUS; SUBCUTANEOUS at 06:30

## 2023-01-29 RX ADMIN — ATORVASTATIN CALCIUM 20 MILLIGRAM(S): 80 TABLET, FILM COATED ORAL at 21:12

## 2023-01-29 RX ADMIN — APIXABAN 10 MILLIGRAM(S): 2.5 TABLET, FILM COATED ORAL at 17:12

## 2023-01-29 RX ADMIN — GABAPENTIN 300 MILLIGRAM(S): 400 CAPSULE ORAL at 05:23

## 2023-01-29 RX ADMIN — GABAPENTIN 300 MILLIGRAM(S): 400 CAPSULE ORAL at 21:12

## 2023-01-29 RX ADMIN — BUDESONIDE AND FORMOTEROL FUMARATE DIHYDRATE 2 PUFF(S): 160; 4.5 AEROSOL RESPIRATORY (INHALATION) at 21:12

## 2023-01-29 RX ADMIN — APIXABAN 10 MILLIGRAM(S): 2.5 TABLET, FILM COATED ORAL at 05:22

## 2023-01-29 RX ADMIN — PANTOPRAZOLE SODIUM 40 MILLIGRAM(S): 20 TABLET, DELAYED RELEASE ORAL at 05:22

## 2023-01-29 RX ADMIN — HYDROMORPHONE HYDROCHLORIDE 2 MILLIGRAM(S): 2 INJECTION INTRAMUSCULAR; INTRAVENOUS; SUBCUTANEOUS at 10:11

## 2023-01-29 RX ADMIN — Medication 25 MICROGRAM(S): at 05:22

## 2023-01-29 RX ADMIN — SENNA PLUS 2 TABLET(S): 8.6 TABLET ORAL at 21:12

## 2023-01-29 RX ADMIN — HYDROMORPHONE HYDROCHLORIDE 2 MILLIGRAM(S): 2 INJECTION INTRAMUSCULAR; INTRAVENOUS; SUBCUTANEOUS at 21:14

## 2023-01-29 RX ADMIN — GABAPENTIN 300 MILLIGRAM(S): 400 CAPSULE ORAL at 14:01

## 2023-01-29 NOTE — PROGRESS NOTE ADULT - SUBJECTIVE AND OBJECTIVE BOX
WILFREDO CHO  [facility] A ([facility] M3-4C [facility])      Patient is a 82y old  Female who presents with a chief complaint of back pain (22 Jan 2023 09:15)        Interval events:  Patient seen and examined at bedside. No acute events overnight. Says her back pain is stable, just having pain in front of chest when wearing TLSO brace. Started on Eliquis. Hb stable.     -PMHx: Hypothyroid    Hyperlipemia    Brain aneurysm    Compression fracture of spine    Presence of IVC filter      -PSHx:S/P appendectomy    S/P coil embolization of cerebral aneurysm            REVIEW OF SYSTEMS:  CONSTITUTIONAL: No fever, weight loss, or fatigue  RESPIRATORY: No cough, wheezing, chills or hemoptysis; No shortness of breath  CARDIOVASCULAR: No chest pain, palpitations, dizziness, or leg swelling  GASTROINTESTINAL: No abdominal or epigastric pain. No nausea, vomiting, or hematemesis; No diarrhea or constipation. No melena or hematochezia.  NEUROLOGICAL: No headaches  LYMPH NODES: No enlarged glands  MUSCULOSKELETAL: as above       T(C): , Max: 36.6 (01-29-23 @ 05:41)  HR: 78 (01-29-23 @ 09:00)  BP: 128/62 (01-29-23 @ 09:00)  RR: 18 (01-29-23 @ 09:00)  SpO2: 99% (01-29-23 @ 09:00)  CAPILLARY BLOOD GLUCOSE        PHYSICAL EXAM:  GENERAL: NAD, obese, lying in bed comfortably, on 2L NC  HEAD:  Atraumatic, Normocephalic  EYES: EOMI, PERRLA, conjunctiva and sclera clear  ENT: Moist mucous membranes  NECK: Supple, No JVD     CHEST/LUNG: CTABL; tender to palpation on front of right side of chest on 10th rib  HEART: Regular rate and rhythm; No murmurs, rubs, or gallops  ABDOMEN: Bowel sounds present; Soft, Nontender, Nondistended. No hepatomegaly  EXTREMITIES:  2+ Peripheral Pulses, brisk capillary refill. Trace b/l LE edema.   NERVOUS SYSTEM:  Alert & Oriented X3, speech clear. No deficits   MSK: FROM all 4 extremities, full and equal strength  SKIN: No rashes or lesions       Spoke with consultants: Y[x ] N [ ]         LABS:          11.3  9.47  )-------(259          36.2  N=66.7  L=19.9  MCV=96.5    134<L>|93<L>|9<L>  ------------------<95  3.8|28|0.8  eGFR:--  Ca:9.1            Microbiology:      RADIOLOGY & ADDITIONAL TESTS:        Medications:  acetaminophen     Tablet .. 650 milliGRAM(s) Oral every 6 hours PRN  albuterol    90 MICROgram(s) HFA Inhaler 2 Puff(s) Inhalation every 6 hours PRN  amLODIPine   Tablet 2.5 milliGRAM(s) Oral daily  apixaban 10 milliGRAM(s) Oral every 12 hours  atorvastatin 20 milliGRAM(s) Oral at bedtime  benzonatate 100 milliGRAM(s) Oral every 8 hours  budesonide 160 MICROgram(s)/formoterol 4.5 MICROgram(s) Inhaler 2 Puff(s) Inhalation two times a day  furosemide    Tablet 20 milliGRAM(s) Oral daily  gabapentin 300 milliGRAM(s) Oral three times a day  HYDROmorphone   Tablet 2 milliGRAM(s) Oral every 4 hours PRN  levothyroxine 25 MICROGram(s) Oral daily  lidocaine   4% Patch 1 Patch Transdermal daily  lidocaine   4% Patch 1 Patch Transdermal daily  pantoprazole    Tablet 40 milliGRAM(s) Oral before breakfast  polyethylene glycol 3350 17 Gram(s) Oral daily  senna 2 Tablet(s) Oral at bedtime  sodium chloride 0.65% Nasal 1 Spray(s) Both Nostrils four times a day PRN  tiotropium 2.5 MICROgram(s) Inhaler 2 Puff(s) Inhalation daily

## 2023-01-29 NOTE — PROGRESS NOTE ADULT - ASSESSMENT
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  Found in the hospital to have COVID.  Transferred here from [facility] for neurosurgery regarding compression fracture, found here to have extensive DVT.  Patient has an IVC filter, following up neuroendovascular surgery on whether patient can have AC.     #Acute hypoxemic respiratory failure  #COVID19 s/p RDV   -Left midlung bandlike opacity. Lung volumes. No focal consolidation otherwise. No pneumothorax or pleural effusion.  -2-3L home O2, back on baseline requirement  -s/p RDV, she was given one dose of decadron at the time, was not continued  -ECHO reviewed     #Recurrent DVT  #New PE, no right heart strain   #Hx/o cerebral aneurysm s/p coiling - no new deficits   -per vascular, no need for thrombectomy    - has an IVC filter, couldn't be on AC in the past because of aneurysm, per neuroendovascular can restart a/c  -now on Eliquis 10mg po bid for 7 days, then will do 5mg po bid for at least 3 months   -MRA brain no bleeding  -will need to f/u with Neuroendovascular Dr. Montesinos in 2-3 months for aneurysm      #Focal hematoma in the right properitoneal space  -per vascular: benefit of anticoagulation supersedes risk. Would recommend restarting anticoagulation. Patient's Hgb actively stable, however, please continue to monitor  -per surgery: abdominal binder, IR if Hg/Hct drops due to expansion of the hematoma, supportive care    # Severe Thoracic compression fracture T12 complicated by worsening severe back pain, limited ROM, difficulty with ambulation due to pain 2/2 severe compression fx  -MRI T-spine: multilevel compression deformities of the thoracic spine  -Neurosurgery was planning on intervention, however DVT was found  - Pain management eval appreciated: dilaudid PRN, gabapentin, toradol PRN for breakthrough pain  - TLSO brace and PT eval  - CM/social work  -per neurosurgery, can do PT/OT/Rehab, if can walk then can discharge and f/u o/p; if not, then will need to do kyphoplasty - looks like patient did ok with both PT and rehab; will start d/c planning   -spoke with [facility] 1/29, plan is to hold off on kyphoplasty, she will need to see them outpatient in a month or so     #Obesity BMI 35  - wt loss per PCP    #Hepatic cyst  - f/u OP     #Hypothyroidism  - cont synthroid    #h/o HTN   - c/w amlodipine     #Hypokalemia  -replete     #h/o DLD   - c/w statin     Diet - DASH   DVT ppx: Eliquis 10mg po bid     Dispo: d/c planning to SNF; likely Monday. Anticipate.     Family: Spoke with daughter, Solange, over phone, answered all questions and updated on patient's condition.

## 2023-01-30 ENCOUNTER — TRANSCRIPTION ENCOUNTER (OUTPATIENT)
Age: 83
End: 2023-01-30

## 2023-01-30 DIAGNOSIS — R53.1 WEAKNESS: ICD-10-CM

## 2023-01-30 DIAGNOSIS — M25.569 PAIN IN UNSPECIFIED KNEE: ICD-10-CM

## 2023-01-30 DIAGNOSIS — M79.89 OTHER SPECIFIED SOFT TISSUE DISORDERS: ICD-10-CM

## 2023-01-30 DIAGNOSIS — S22.000A WEDGE COMPRESSION FRACTURE OF UNSPECIFIED THORACIC VERTEBRA, INITIAL ENCOUNTER FOR CLOSED FRACTURE: ICD-10-CM

## 2023-01-30 PROBLEM — M48.50XA COLLAPSED VERTEBRA, NOT ELSEWHERE CLASSIFIED, SITE UNSPECIFIED, INITIAL ENCOUNTER FOR FRACTURE: Chronic | Status: ACTIVE | Noted: 2023-01-16

## 2023-01-30 PROBLEM — Z95.828 PRESENCE OF OTHER VASCULAR IMPLANTS AND GRAFTS: Chronic | Status: ACTIVE | Noted: 2023-01-16

## 2023-01-30 LAB
ALBUMIN SERPL ELPH-MCNC: 3 G/DL — LOW (ref 3.5–5.2)
ALP SERPL-CCNC: 115 U/L — SIGNIFICANT CHANGE UP (ref 30–115)
ALT FLD-CCNC: 18 U/L — SIGNIFICANT CHANGE UP (ref 0–41)
ANION GAP SERPL CALC-SCNC: 6 MMOL/L — LOW (ref 7–14)
AST SERPL-CCNC: 31 U/L — SIGNIFICANT CHANGE UP (ref 0–41)
BASOPHILS # BLD AUTO: 0.07 K/UL — SIGNIFICANT CHANGE UP (ref 0–0.2)
BASOPHILS NFR BLD AUTO: 0.7 % — SIGNIFICANT CHANGE UP (ref 0–1)
BILIRUB SERPL-MCNC: 0.9 MG/DL — SIGNIFICANT CHANGE UP (ref 0.2–1.2)
BUN SERPL-MCNC: 10 MG/DL — SIGNIFICANT CHANGE UP (ref 10–20)
CALCIUM SERPL-MCNC: 8.6 MG/DL — SIGNIFICANT CHANGE UP (ref 8.4–10.4)
CHLORIDE SERPL-SCNC: 95 MMOL/L — LOW (ref 98–110)
CO2 SERPL-SCNC: 35 MMOL/L — HIGH (ref 17–32)
CREAT SERPL-MCNC: 0.7 MG/DL — SIGNIFICANT CHANGE UP (ref 0.7–1.5)
EGFR: 87 ML/MIN/1.73M2 — SIGNIFICANT CHANGE UP
EOSINOPHIL # BLD AUTO: 0.23 K/UL — SIGNIFICANT CHANGE UP (ref 0–0.7)
EOSINOPHIL NFR BLD AUTO: 2.4 % — SIGNIFICANT CHANGE UP (ref 0–8)
GLUCOSE SERPL-MCNC: 92 MG/DL — SIGNIFICANT CHANGE UP (ref 70–99)
HCT VFR BLD CALC: 35.6 % — LOW (ref 37–47)
HGB BLD-MCNC: 11.1 G/DL — LOW (ref 12–16)
IMM GRANULOCYTES NFR BLD AUTO: 0.6 % — HIGH (ref 0.1–0.3)
LYMPHOCYTES # BLD AUTO: 1.82 K/UL — SIGNIFICANT CHANGE UP (ref 1.2–3.4)
LYMPHOCYTES # BLD AUTO: 19 % — LOW (ref 20.5–51.1)
MAGNESIUM SERPL-MCNC: 1.9 MG/DL — SIGNIFICANT CHANGE UP (ref 1.8–2.4)
MCHC RBC-ENTMCNC: 30.4 PG — SIGNIFICANT CHANGE UP (ref 27–31)
MCHC RBC-ENTMCNC: 31.2 G/DL — LOW (ref 32–37)
MCV RBC AUTO: 97.5 FL — SIGNIFICANT CHANGE UP (ref 81–99)
MONOCYTES # BLD AUTO: 1.02 K/UL — HIGH (ref 0.1–0.6)
MONOCYTES NFR BLD AUTO: 10.6 % — HIGH (ref 1.7–9.3)
NEUTROPHILS # BLD AUTO: 6.38 K/UL — SIGNIFICANT CHANGE UP (ref 1.4–6.5)
NEUTROPHILS NFR BLD AUTO: 66.7 % — SIGNIFICANT CHANGE UP (ref 42.2–75.2)
NRBC # BLD: 0 /100 WBCS — SIGNIFICANT CHANGE UP (ref 0–0)
PLATELET # BLD AUTO: 258 K/UL — SIGNIFICANT CHANGE UP (ref 130–400)
POTASSIUM SERPL-MCNC: 3 MMOL/L — LOW (ref 3.5–5)
POTASSIUM SERPL-SCNC: 3 MMOL/L — LOW (ref 3.5–5)
PROT SERPL-MCNC: 5.3 G/DL — LOW (ref 6–8)
RBC # BLD: 3.65 M/UL — LOW (ref 4.2–5.4)
RBC # FLD: 13.6 % — SIGNIFICANT CHANGE UP (ref 11.5–14.5)
SARS-COV-2 RNA SPEC QL NAA+PROBE: DETECTED
SODIUM SERPL-SCNC: 136 MMOL/L — SIGNIFICANT CHANGE UP (ref 135–146)
WBC # BLD: 9.58 K/UL — SIGNIFICANT CHANGE UP (ref 4.8–10.8)
WBC # FLD AUTO: 9.58 K/UL — SIGNIFICANT CHANGE UP (ref 4.8–10.8)

## 2023-01-30 PROCEDURE — 99232 SBSQ HOSP IP/OBS MODERATE 35: CPT

## 2023-01-30 RX ORDER — AMLODIPINE BESYLATE 2.5 MG/1
1 TABLET ORAL
Qty: 0 | Refills: 0 | DISCHARGE

## 2023-01-30 RX ORDER — ONDANSETRON 8 MG/1
4 TABLET, FILM COATED ORAL ONCE
Refills: 0 | Status: COMPLETED | OUTPATIENT
Start: 2023-01-30 | End: 2023-01-30

## 2023-01-30 RX ORDER — HYDROMORPHONE HYDROCHLORIDE 2 MG/ML
1 INJECTION INTRAMUSCULAR; INTRAVENOUS; SUBCUTANEOUS
Qty: 0 | Refills: 0 | DISCHARGE
Start: 2023-01-30

## 2023-01-30 RX ORDER — ATORVASTATIN CALCIUM 80 MG/1
1 TABLET, FILM COATED ORAL
Qty: 0 | Refills: 0 | DISCHARGE
Start: 2023-01-30

## 2023-01-30 RX ORDER — POTASSIUM CHLORIDE 20 MEQ
20 PACKET (EA) ORAL
Refills: 0 | Status: COMPLETED | OUTPATIENT
Start: 2023-01-30 | End: 2023-01-30

## 2023-01-30 RX ORDER — ATORVASTATIN CALCIUM 80 MG/1
0 TABLET, FILM COATED ORAL
Qty: 0 | Refills: 0 | DISCHARGE

## 2023-01-30 RX ORDER — PANTOPRAZOLE SODIUM 20 MG/1
1 TABLET, DELAYED RELEASE ORAL
Qty: 0 | Refills: 0 | DISCHARGE
Start: 2023-01-30

## 2023-01-30 RX ORDER — GABAPENTIN 400 MG/1
1 CAPSULE ORAL
Qty: 0 | Refills: 0 | DISCHARGE
Start: 2023-01-30

## 2023-01-30 RX ORDER — FUROSEMIDE 40 MG
1 TABLET ORAL
Qty: 0 | Refills: 0 | DISCHARGE
Start: 2023-01-30

## 2023-01-30 RX ORDER — AMLODIPINE BESYLATE 2.5 MG/1
0 TABLET ORAL
Qty: 0 | Refills: 0 | DISCHARGE

## 2023-01-30 RX ORDER — APIXABAN 2.5 MG/1
2 TABLET, FILM COATED ORAL
Qty: 0 | Refills: 0 | DISCHARGE
Start: 2023-01-30

## 2023-01-30 RX ADMIN — APIXABAN 10 MILLIGRAM(S): 2.5 TABLET, FILM COATED ORAL at 17:20

## 2023-01-30 RX ADMIN — HYDROMORPHONE HYDROCHLORIDE 2 MILLIGRAM(S): 2 INJECTION INTRAMUSCULAR; INTRAVENOUS; SUBCUTANEOUS at 21:03

## 2023-01-30 RX ADMIN — HYDROMORPHONE HYDROCHLORIDE 2 MILLIGRAM(S): 2 INJECTION INTRAMUSCULAR; INTRAVENOUS; SUBCUTANEOUS at 22:15

## 2023-01-30 RX ADMIN — LIDOCAINE 1 PATCH: 4 CREAM TOPICAL at 11:41

## 2023-01-30 RX ADMIN — HYDROMORPHONE HYDROCHLORIDE 2 MILLIGRAM(S): 2 INJECTION INTRAMUSCULAR; INTRAVENOUS; SUBCUTANEOUS at 08:29

## 2023-01-30 RX ADMIN — GABAPENTIN 300 MILLIGRAM(S): 400 CAPSULE ORAL at 05:06

## 2023-01-30 RX ADMIN — LIDOCAINE 1 PATCH: 4 CREAM TOPICAL at 23:27

## 2023-01-30 RX ADMIN — Medication 20 MILLIGRAM(S): at 05:08

## 2023-01-30 RX ADMIN — Medication 25 MICROGRAM(S): at 05:06

## 2023-01-30 RX ADMIN — ATORVASTATIN CALCIUM 20 MILLIGRAM(S): 80 TABLET, FILM COATED ORAL at 21:03

## 2023-01-30 RX ADMIN — LIDOCAINE 1 PATCH: 4 CREAM TOPICAL at 11:40

## 2023-01-30 RX ADMIN — Medication 50 MILLIEQUIVALENT(S): at 08:40

## 2023-01-30 RX ADMIN — PANTOPRAZOLE SODIUM 40 MILLIGRAM(S): 20 TABLET, DELAYED RELEASE ORAL at 05:09

## 2023-01-30 RX ADMIN — ONDANSETRON 4 MILLIGRAM(S): 8 TABLET, FILM COATED ORAL at 19:00

## 2023-01-30 RX ADMIN — BUDESONIDE AND FORMOTEROL FUMARATE DIHYDRATE 2 PUFF(S): 160; 4.5 AEROSOL RESPIRATORY (INHALATION) at 21:02

## 2023-01-30 RX ADMIN — LIDOCAINE 1 PATCH: 4 CREAM TOPICAL at 19:26

## 2023-01-30 RX ADMIN — APIXABAN 10 MILLIGRAM(S): 2.5 TABLET, FILM COATED ORAL at 05:06

## 2023-01-30 RX ADMIN — ONDANSETRON 4 MILLIGRAM(S): 8 TABLET, FILM COATED ORAL at 11:50

## 2023-01-30 RX ADMIN — GABAPENTIN 300 MILLIGRAM(S): 400 CAPSULE ORAL at 21:03

## 2023-01-30 RX ADMIN — HYDROMORPHONE HYDROCHLORIDE 2 MILLIGRAM(S): 2 INJECTION INTRAMUSCULAR; INTRAVENOUS; SUBCUTANEOUS at 02:31

## 2023-01-30 RX ADMIN — HYDROMORPHONE HYDROCHLORIDE 2 MILLIGRAM(S): 2 INJECTION INTRAMUSCULAR; INTRAVENOUS; SUBCUTANEOUS at 02:01

## 2023-01-30 RX ADMIN — Medication 50 MILLIEQUIVALENT(S): at 11:50

## 2023-01-30 RX ADMIN — GABAPENTIN 300 MILLIGRAM(S): 400 CAPSULE ORAL at 13:36

## 2023-01-30 RX ADMIN — HYDROMORPHONE HYDROCHLORIDE 2 MILLIGRAM(S): 2 INJECTION INTRAMUSCULAR; INTRAVENOUS; SUBCUTANEOUS at 07:59

## 2023-01-30 RX ADMIN — SENNA PLUS 2 TABLET(S): 8.6 TABLET ORAL at 21:03

## 2023-01-30 NOTE — DISCHARGE NOTE PROVIDER - NSDCFUSCHEDAPPT_GEN_ALL_CORE_FT
[facility] Physician [facility]  NEUROSURG [address] [facility]  Scheduled Appointment: 02/24/2023

## 2023-01-30 NOTE — DISCHARGE NOTE PROVIDER - NSDCMRMEDTOKEN_GEN_ALL_CORE_FT
acetaminophen 325 mg oral tablet: 2 tab(s) orally every 6 hours  ALBUTEROL HFA 90 MCG INHALER:   AMLODIPINE BESYLATE 2.5 MG TAB: 1 tab(s) orally once a day (at bedtime)  apixaban 5 mg oral tablet: 2 tab(s) orally every 12 hours till 2/3/2023, then 1 tab orally every 12 hrs. Follow up with your primary care doctor regarding duration of treatment  atorvastatin 20 mg oral tablet: 1 tab(s) orally once a day (at bedtime)  furosemide 20 mg oral tablet: 1 tab(s) orally once a day  gabapentin 300 mg oral capsule: 1 cap(s) orally 3 times a day  HYDROmorphone 2 mg oral tablet: 1 tab(s) orally every 4 hours, As needed, Severe Pain (7 - 10)  LEVOTHYROXINE 25 MCG TABLET: tab(s) orally once a day  lidocaine 4% topical film: Apply topically to affected area once a day  pantoprazole 40 mg oral delayed release tablet: 1 tab(s) orally once a day (before a meal) for 14 days  polyethylene glycol 3350 oral powder for reconstitution: 17 gram(s) orally once a day  senna leaf extract oral tablet: 2 tab(s) orally once a day (at bedtime)  Symbicort 160 mcg-4.5 mcg/inh inhalation aerosol: 2 puff(s) inhaled 2 times a day  tiotropium 2.5 mcg/inh inhalation aerosol: 2 puff(s) inhaled once a day

## 2023-01-30 NOTE — PROGRESS NOTE ADULT - SUBJECTIVE AND OBJECTIVE BOX
WILFREDO CHO  [facility] [address] ([facility] M3-4C [facility])      Patient is a 82y old  Female who presents with a chief complaint of Lumbar spine fracture (30 Jan 2023 08:59)        Interval events:  Patient seen and examined at bedside. No acute events overnight. C/o back pain, stable though. Not wearing TLSO brace despite encouragement due to pain in front of chest (where hematoma is). No other complaints.     -PMHx: Hypothyroid     Hyperlipemia    Brain aneurysm    Compression fracture of spine    Presence of IVC filter      -PSHx:S/P appendectomy    S/P coil embolization of cerebral aneurysm            REVIEW OF SYSTEMS:  CONSTITUTIONAL: No fever, weight loss, or fatigue  RESPIRATORY: No cough, wheezing, chills or hemoptysis; No shortness of breath  CARDIOVASCULAR: No chest pain, palpitations, dizziness, or leg swelling  GASTROINTESTINAL: No abdominal or epigastric pain. No nausea, vomiting, or hematemesis; No diarrhea or constipation. No melena or hematochezia.  NEUROLOGICAL: No headaches   LYMPH NODES: No enlarged glands  MUSCULOSKELETAL: as above       T(C): , Max: 36.3 (01-30-23 @ 05:01)  HR: 71 (01-30-23 @ 08:58)  BP: 95/61 (01-30-23 @ 08:58)  RR: 18 (01-30-23 @ 08:58)  SpO2: 97% (01-30-23 @ 08:58)  CAPILLARY BLOOD GLUCOSE          PHYSICAL EXAM:  GENERAL: NAD, obese, lying in bed comfortably, on 2L NC  HEAD:  Atraumatic, Normocephalic  EYES: EOMI, PERRLA, conjunctiva and sclera clear  ENT: Moist mucous membranes  NECK: Supple, No JVD     CHEST/LUNG: CTABL; tender to palpation on front of right side of chest on 10th rib  HEART: Regular rate and rhythm; No murmurs, rubs, or gallops  ABDOMEN: Bowel sounds present; Soft, Nontender, Nondistended. No hepatomegaly  EXTREMITIES:  2+ Peripheral Pulses, brisk capillary refill. Trace b/l LE edema.   NERVOUS SYSTEM:  Alert & Oriented X3, speech clear. No deficits   MSK: FROM all 4 extremities, full and equal strength  SKIN: No rashes or lesions       Spoke with consultants: Y[x ] N [ ]       LABS:          11.1  9.58  )-------(258          35.6  N=66.7  L=19.0  MCV=97.5    136|95<L>|10  ------------------<92  3.0<L>|35<H>|0.7  eGFR:--  Ca:8.6            Microbiology:      RADIOLOGY & ADDITIONAL TESTS:        Medications:  amLODIPine   Tablet 2.5 milliGRAM(s) Oral daily  apixaban 10 milliGRAM(s) Oral every 12 hours  atorvastatin 20 milliGRAM(s) Oral at bedtime  budesonide 160 MICROgram(s)/formoterol 4.5 MICROgram(s) Inhaler 2 Puff(s) Inhalation two times a day  furosemide    Tablet 20 milliGRAM(s) Oral daily  gabapentin 300 milliGRAM(s) Oral three times a day  HYDROmorphone   Tablet 2 milliGRAM(s) Oral every 4 hours PRN  levothyroxine 25 MICROGram(s) Oral daily  lidocaine   4% Patch 1 Patch Transdermal daily  lidocaine   4% Patch 1 Patch Transdermal daily  pantoprazole    Tablet 40 milliGRAM(s) Oral before breakfast  polyethylene glycol 3350 17 Gram(s) Oral daily  senna 2 Tablet(s) Oral at bedtime  tiotropium 2.5 MICROgram(s) Inhaler 2 Puff(s) Inhalation daily

## 2023-01-30 NOTE — CONSULT NOTE ADULT - CONSULT REQUESTED DATE/TIME
25-Jan-2023 17:13
24-Jan-2023
25-Jan-2023 13:14
27-Jan-2023 23:08
19-Jan-2023 09:51
24-Jan-2023 11:30
24-Jan-2023 15:06
20-Jan-2023 06:30
17-Jan-2023 17:30

## 2023-01-30 NOTE — CONSULT NOTE ADULT - CONSULT REASON
PRE OPERATIVE PULMONARY EVALUATION  KYPHOPLASTY
covid-19+
hematoma
Acute on Chronic Back Pain
Compression Fx
rle dvt
sobb pre op
AC clearance for 81 y/o with aneurysm s/p coil in 2010 and extensive DVTs
Upper back pain

## 2023-01-30 NOTE — DISCHARGE NOTE PROVIDER - PROVIDER TOKENS
PROVIDER:[TOKEN:[[id number]],FOLLOWUP:[1 month]],PROVIDER:[TOKEN:[[id number]],FOLLOWUP:[2 weeks]],PROVIDER:[TOKEN:[[id number]],FOLLOWUP:[2 weeks],ESTABLISHEDPATIENT:[T]],PROVIDER:[TOKEN:[[id number]],FOLLOWUP:[2 weeks]] PROVIDER:[TOKEN:[[id number]],FOLLOWUP:[1 month]],PROVIDER:[TOKEN:[[id number]],FOLLOWUP:[2 weeks]],PROVIDER:[TOKEN:[[id number]],FOLLOWUP:[2 weeks],ESTABLISHEDPATIENT:[T]],PROVIDER:[TOKEN:[[id number]],FOLLOWUP:[2 months]]

## 2023-01-30 NOTE — DISCHARGE NOTE PROVIDER - ATTENDING DISCHARGE PHYSICAL EXAMINATION:
PHYSICAL EXAM:  GENERAL: NAD, obese, lying in bed comfortably, on 2L NC  HEAD:  Atraumatic, Normocephalic  EYES: EOMI, PERRLA, conjunctiva and sclera clear  ENT: Moist mucous membranes  NECK: Supple, No JVD     CHEST/LUNG: CTABL; tender to palpation on front of right side of chest on 10th rib  HEART: Regular rate and rhythm; No murmurs, rubs, or gallops  ABDOMEN: Bowel sounds present; Soft, Nontender, Nondistended. No hepatomegaly  EXTREMITIES:  2+ Peripheral Pulses, brisk capillary refill. Trace b/l LE edema.   NERVOUS SYSTEM:  Alert & Oriented X3, speech clear. No deficits   MSK: FROM all 4 extremities, full and equal strength  SKIN: No rashes or lesions      Vital Signs Last 24 Hrs  T(C): 36.1 (30 Jan 2023 08:58), Max: 36.3 (30 Jan 2023 05:01)  T(F): 96.9 (30 Jan 2023 08:58), Max: 97.4 (30 Jan 2023 05:01)  HR: 71 (30 Jan 2023 08:58) (60 - 106)  BP: 95/61 (30 Jan 2023 08:58) (95/61 - 117/76)  BP(mean): --  RR: 18 (30 Jan 2023 08:58) (18 - 18)  SpO2: 97% (30 Jan 2023 08:58) (95% - 99%)

## 2023-01-30 NOTE — CONSULT NOTE ADULT - PROBLEM SELECTOR RECOMMENDATION 9
+longstanding history of chronic opioid therapy, but none since ~2017; patient may still have some opioid tolerance. Low risk of opioid abuse per ORT.  1) Continue hydromorphone PO 2mg Q4h prn; may increase to 4mg if needed  2) Continue gabapentin 300mg TID  3) Start cyclobenzaprine 5mg Q8h standing  4) Start acetaminophen 650mg Q6h standing  5) Hold NSAIDs in the setting of ATC for PE  6) Continue lidocaine patch  7) Continue miralax, senna

## 2023-01-30 NOTE — CONSULT NOTE ADULT - SUBJECTIVE AND OBJECTIVE BOX
Pain Medicine Consult Note    History of Present Illness  Patient is an 83 y/o woman with history of HL, interstitial lung disease on home O2, thoracic compression fracture, and DVT who was admitted on 1/16/2023 with upper back pain. The patient states that she was sick with a cough in December of 2022 and, during a fit of coughing, developed acute onset of pain in the mid to upper back. She states that this pain was severe and sometimes radiated but the spine but stayed primarily in the midline of the back. No radiation of the pain to the lower extremities or the lateral chest wall. No bowel or bladder incontinence or saddle anesthesia. The patient states that she was previously on high dose chronic opioid therapy for approximately 15 years but that she stopped taking opioids in 2017 due to their overall inefficacy in treating her back pain. She states that, at her peak, she was on hydromorphone PO 8mg Q4h prn (MED = 192) and had been on fentanyl duragesic patches prior to that. Currently, the patient states that he pain has improved since she was rotated to hydromorphone PO. She denies having any side effects, including sedation or constipation.      Current Inpatient Medication Regimen:  amLODIPine   Tablet 2.5 milliGRAM(s) Oral daily  apixaban 10 milliGRAM(s) Oral every 12 hours  atorvastatin 20 milliGRAM(s) Oral at bedtime  budesonide 160 MICROgram(s)/formoterol 4.5 MICROgram(s) Inhaler 2 Puff(s) Inhalation two times a day  furosemide    Tablet 20 milliGRAM(s) Oral daily  gabapentin 300 milliGRAM(s) Oral three times a day  HYDROmorphone   Tablet 2 milliGRAM(s) Oral every 4 hours PRN  levothyroxine 25 MICROGram(s) Oral daily  lidocaine   4% Patch 1 Patch Transdermal daily  lidocaine   4% Patch 1 Patch Transdermal daily  pantoprazole    Tablet 40 milliGRAM(s) Oral before breakfast  polyethylene glycol 3350 17 Gram(s) Oral daily  senna 2 Tablet(s) Oral at bedtime  tiotropium 2.5 MICROgram(s) Inhaler 2 Puff(s) Inhalation daily      Home Analgesic Regimen:  None    Allergies:  Compazine (Unknown)      Past Medical History:  Hypothyroid  Hyperlipemia  Brain aneurysm  Multiple compression fractures of thoracic spine  COVID    Past Surgical History:  S/P appendectomy  S/P coil embolization of cerebral aneurysm    Family History:  Denies    Social History:  Tobacco - denies  EtOH - denies  Drugs - denies      Review of Systems:  General: no fevers or chills  Eyes: no diplopia or blurred vision  ENT: no rhinorrhea  CV: no chest pain  Resp: no cough or dyspnea  GI: no abdominal pain, constipation, or diarrhea  : no urinary incontinence or dysuria  Neuro: no focal weakness or numbness in extremities    Physical Exam:  T(C): 35.9 (01-30-23 @ 21:03), Max: 36.8 (01-30-23 @ 16:58)  HR: 88 (01-30-23 @ 21:03) (60 - 88)  BP: 117/60 (01-30-23 @ 21:03) (95/61 - 117/60)  RR: 18 (01-30-23 @ 21:03) (18 - 18)  SpO2: 99% (01-30-23 @ 21:03) (95% - 99%)  Gen: NAD  Eyes: no glasses or scleral icterus  Head: Normocephalic / Atraumatic  CV: no JVD  Lungs: nonlabored breathing  Abdomen: nondistended, soft  : no arnold catheter in place  Back: tenderness to palpation in the mid thoracic region over the midline  Neuro: AOx3, Cranial nerves intact, +5/5 strength in bilateral lower extremities  Extremities: full ROM in upper/lower extremities  Psych: normal affect      Labs:  CBC  9.58 K/uL [4.80 - 10.80] > 11.1 g/dL<L> [12.0 - 16.0] / 35.6 %<L> [37.0 - 47.0] < 258 K/uL [130 - 400]      BMP  136 mmol/L [135 - 146] | 95 mmol/L<L> [98 - 110] | 10 mg/dL [10 - 20]  3.0 mmol/L<L> [3.5 - 5.0] | 35 mmol/L<H> [17 - 32] | 0.7 mg/dL [0.7 - 1.5]    92 mg/dL [70 - 99]        Imaging Studies:  MRI Thoracic Spine (12/30/2022)  FINDINGS:    ALIGNMENT: There is exaggerated thoracic kyphosis with focal kyphotic   curvature at T12 to compression deformity.    VERTEBRAE: There is redemonstrated mild compression deformity of T4 (20%   height loss) without marrow edema, moderate compression deformity of T5   (60%) with marrow edema, mild compression deformity of T9 (20%) with   marrow edema, mild-moderate compression of T11 (40%) without marrow   edema, and severe compression deformity of T12 with obliteration of the   anterior vertebral body.    There is trace retropulsion of the T5 posterior cortex resulting in mild   spinal stenosis. There is mild retropulsion of the posterior cortex of   T12 vertebra into the spinal space resulting in mild spinal stenosis.    DISCS: Multilevel moderate disc space narrowing. Coalescence  of T11-T12 and T12 on L1 disc spaces due to obliteration of the anterior   T12 vertebra.    CORD: The conus medullaris terminates at L1-2. There is a focal cord   signal abnormality at the level of T5 (4-8).    EVALUATION OF INDIVIDUAL LEVELS:    T5-T6: Trace retropulsion of T5 posterior cortex and mild bilateral facet   arthropathy contributing to mild spinal stenosis and moderate bilateral   foraminal stenosis.    T7-8: Focal bony spur versus calcified superiorly extending extrusion in   the posterior T7 cortex and mild bilateral facet arthropathy contributing   to mild-moderate spinal stenosis. No disc herniation or foraminal   stenosis. There is focal indentation on the right ventral cord without   cord edema.    T8-9: Shallow right paracentral osteophyte contributing to mild spinal   stenosis. No disc herniation or foraminal stenosis.    T10-11: Shallow disc bulge and mild bilateral facet arthropathy   contributing to mild spinal stenosis. No foraminal stenosis.    T11-12: Mild retropulsion of T12 posterior cortex and shallow uncovered   disc bulge, along with mild bilateral facet arthropathy contributing to   mild spinal stenosis and moderate right foraminal stenosis. There is   flattening of the ventral cord without cord edema.    T12-L1: Mild retropulsion of T12 posterior cortex and shallow uncovered   disc bulge along with moderate bilateral facet arthropathy contributing   to mild spinal stenosis and severe left/moderate right foraminal stenosis.    The remaining thoracic levels are without disc herniation, spinal or   foraminal stenosis.    PARAVERTEBRAL SOFT TISSUES: Partially imaged right hepatic dome cystic   lesion better evaluated on the CTA of the chest dated 12/26/2022.      IMPRESSION:    Redemonstrated multilevel compression deformities of the thoracic spine:  -Mild compression deformity of T4, likely chronic.  -Moderate compression deformity of T5 with trace retropulsion of the   vertebral cortex, likely subacute.  -Mild compression deformity of T9, likely subacute.  -Mild-moderate compression deformity of T11, likely chronic.  -Severe compression deformity of T12 with mild retropulsion of the   posterior cortex, likely acute on chronic. This results in mild   flattening of the ventral cord.    Well-defined focal cord signal abnormality at the level of T5 could   reflect myelomalacia versus focal cord edema possibly associated with   trace retropulsion of T5. Intramedullary mass is less likely but cannot   be completely excluded.    Multilevel thoracic spondylosis as above, more notable at T7-8 with   mild-moderate spinal stenosis with focal indentation in the right ventral   cord secondary to T7 right paracentral focal bony spurring versus   calcified superiorly extending T7-8 disc extrusion. No cord edema.    T12-L1 mild spinal stenosis associated with retropulsion of T12 posterior   cortex, and severe left/moderate right foraminal stenosis.    CT PE protocol (1/24/2023)  Findings: Motion artifact limits parenchymal assessment.    Pulmonary Arteries:Interval development, occlusive emboli within the   proximal right lower lobe pulmonary artery is noted (4/).    Main pulmonary artery segment measures 3.1 cm. RV/LV ratio less than 1.   (No heart strain)    Tubes/Lines: none  Mediastinum/hilum: No adenopathy or mass.  .     Heart/Great Vessels:No pericardial effusions. Ascending aorta normal in   caliber..    Abdomen: Hepatic steatosis. Stable approximate 7 cm cyst, right hepatic   dome.    Bones and soft tissues: Severe degenerative changes, thoracic spine    Lungs, Pleura, and Airways:   Patent central tracheobronchial tree. No effusions or parenchymal masses   are noted. Stable reticular opacities most pronounced within left upper   lobe are noted.      Pulmonary nodules:  No suspicious pulmonary nodules.    IMPRESSION:    Since  12/26/2022      Interval development, occlusive emboli within the proximal right lower   lobe pulmonary artery (4/). No right heart strain.    Opioid Risk Assessment Tool                                                                         Female       Male  Family History  Alcohol                                                              1                3  Illegal drugs                                                       2                3  Rx drugs                                                            4                4    Personal History   Alcohol                                                              3                3  Illegal drugs                                                       4                4  Rx drugs                                                            5                5    Age between 16—45 years                                1                1  History of preadolescent sexual abuse               3                0    Psychological disease  ADD, OCD, bipolar, schizophrenia                      2                2  Depression                                                       1                1    Total Score                                                      0              __    0 - 3 = low risk for future opioid abuse  4 - 7 = moderate risk for future opioid abuse  8+ = high risk for future opioid abuse

## 2023-01-30 NOTE — DISCHARGE NOTE PROVIDER - CARE PROVIDER_API CALL
Lion Hwang)  Surgery  Neurosurgery  [address] [place] [address]  Phone: [phone]  Fax: [phone]  Follow Up Time: 1 month    Kevin Campbell)  Internal Medicine  [address] [place] [address]  Phone: [phone]  Fax: [phone]  Follow Up Time: 2 weeks    Kelsey Caballero)  Geriatric Medicine; Internal Medicine  [address] [place] [address]  Phone: [phone]  Fax: [phone]  [facility] [place] Patient  Follow Up Time: 2 weeks    Derrick Montesinos; [facility])  Surgery  Neurosurgery  [address] [place] [address]  Phone: [phone]  Fax: [phone]  Follow Up Time: 2 weeks   Lion Hwang)  Surgery  Neurosurgery  [address] [place] [address]  Phone: [phone]  Fax: [phone]  Follow Up Time: 1 month    Kevin Campbell)  Internal Medicine  [address] [place] [address]  Phone: [phone]  Fax: [phone]  Follow Up Time: 2 weeks    Kelsey Caballero)  Geriatric Medicine; Internal Medicine  [address] [place] [address]  Phone: [phone]  Fax: [phone]  [facility] Patient  Follow Up Time: 2 weeks    Derrick Montesinos; [facility])  Surgery  Neurosurgery  [address] [place] [address]  Phone: [phone]  Fax: [phone]  Follow Up Time: 2 months

## 2023-01-30 NOTE — PROGRESS NOTE ADULT - ASSESSMENT
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  Found in the hospital to have COVID.  Transferred here from [facility] for neurosurgery regarding compression fracture, found here to have extensive DVT.  Patient has an IVC filter, following up neuroendovascular surgery on whether patient can have AC.     #Acute hypoxemic respiratory failure  #COVID19 s/p RDV   -Left midlung bandlike opacity. Lung volumes. No focal consolidation otherwise. No pneumothorax or pleural effusion.  -2-3L home O2, back on baseline requirement  -s/p RDV, she was given one dose of decadron at the time, was not continued  -ECHO reviewed     #Recurrent DVT  #New PE, no right heart strain   #Hx/o cerebral aneurysm s/p coiling - no new deficits   -per vascular, no need for thrombectomy    - has an IVC filter, couldn't be on AC in the past because of aneurysm, per neuroendovascular can restart a/c  -now on Eliquis 10mg po bid for 7 days, then will do 5mg po bid for at least 3 months   -MRA brain no bleeding  -will need to f/u with Neuroendovascular Dr. Montesinos in 2-3 months for aneurysm      #Focal hematoma in the right properitoneal space  -per vascular: benefit of anticoagulation supersedes risk. Would recommend restarting anticoagulation. Patient's Hgb actively stable, however, please continue to monitor  -per surgery: abdominal binder, IR if Hg/Hct drops due to expansion of the hematoma, supportive care    # Severe Thoracic compression fracture T12 complicated by worsening severe back pain, limited ROM, difficulty with ambulation due to pain 2/2 severe compression fx  -MRI T-spine: multilevel compression deformities of the thoracic spine  -Neurosurgery was planning on intervention, however DVT was found  - Pain management eval appreciated: dilaudid PRN, gabapentin, toradol PRN for breakthrough pain  - TLSO brace and PT eval  - CM/social work  -per neurosurgery, can do PT/OT/Rehab, if can walk then can discharge and f/u o/p; if not, then will need to do kyphoplasty - looks like patient did ok with both PT and rehab; will start d/c planning   -spoke with NSx 1/29, plan is to hold off on kyphoplasty, she will need to see them outpatient in a month or so     #Obesity BMI 35  - wt loss per PCP    #Hepatic cyst  - f/u OP     #Hypothyroidism  - cont synthroid    #h/o HTN   - c/w amlodipine     #Hypokalemia  -replete K riders x2 today     #h/o DLD   - c/w statin     Diet - DASH   DVT ppx: Eliquis 10mg po bid     Dispo: d/c planning to SNF; pending auth/bed ([facility] vs [facility]); keep d/c ready   Family: Spoke with daughter, Solange, over phone, answered all questions and updated on patient's condition.

## 2023-01-30 NOTE — CONSULT NOTE ADULT - PROVIDER SPECIALTY LIST ADULT
Neurology
Pain Medicine
Cardiology
Physiatry
Pulmonology
Neurosurgery
Surgery
Vascular Surgery
Pain Medicine

## 2023-01-30 NOTE — DISCHARGE NOTE PROVIDER - CARE PROVIDERS DIRECT ADDRESSES
,DirectAddress_Unknown,DirectAddress_Unknown,[email or web address],DirectAddress_Unknown

## 2023-01-30 NOTE — DISCHARGE NOTE PROVIDER - NSDCFUADDAPPT_GEN_ALL_CORE_FT
Follow up with liver doctor (Dr. Campbell) for a cyst in your liver.  Follow up with your PCP for weight loss. Follow up with liver doctor (Dr. Campbell) for a cyst in your liver.  Follow up with your PCP for weight loss.  Follow up with Dr. Montesinos for care of your brain aneurysm.  Follow up with Dr. Hwang in a month for your compression fracture in your spine.

## 2023-01-30 NOTE — CONSULT NOTE ADULT - ASSESSMENT
Patient is an 81 y/o woman with history of HL, interstitial lung disease on home O2, thoracic compression fracture, and DVT who was admitted on 1/16/2023 with upper back pain.

## 2023-01-30 NOTE — DISCHARGE NOTE PROVIDER - HOSPITAL COURSE
HPI:  82 year old female with past medical history of DLD, ILD on 2-3L home O2, compression fx thoracic spine, IVC filter, and hypothyroidism presents after recent discharge for shortness of breath and back pain. Patient lives at home alone, and is unable to care for herself. She had a visiting nurse service once since discharge who agreed the patient is unsafe to be living at home. Patient was found to be covid positive upon admission. She states that she has a slight sore throat and had one loose BM today, but does not feel any more short of breath than she she has been since last admission. Patient also states that over the last month, she has been having swelling of both lower legs. She denies chest pain/nausea/vomiting/ abdominal pain. Her back pain has been controlled on 20mg oxycodone.     Discharge A/P:  #Acute hypoxemic respiratory failure  #COVID19 s/p RDV   -Left midlung bandlike opacity. Lung volumes. No focal consolidation otherwise. No pneumothorax or pleural effusion.  -2-3L home O2, back on baseline requirement  -s/p RDV, she was given one dose of decadron at the time, was not continued  -ECHO reviewed     #Recurrent DVT  #New PE, no right heart strain   #Hx/o cerebral aneurysm s/p coiling - no new deficits   -per vascular, no need for thrombectomy    - has an IVC filter, couldn't be on AC in the past because of aneurysm, per neuroendovascular can restart a/c  -now on Eliquis 10mg po bid for 7 days, then will do 5mg po bid for at least 3 months   -MRA brain no bleeding  -will need to f/u with Neuroendovascular Dr. Montesinos in 2-3 months for aneurysm      #Focal hematoma in the right properitoneal space  -per vascular: benefit of anticoagulation supersedes risk. Would recommend restarting anticoagulation. Patient's Hgb stable  -per surgery: abdominal binder    # Severe Thoracic compression fracture T12 complicated by worsening severe back pain, limited ROM, difficulty with ambulation due to pain 2/2 severe compression fx  -MRI T-spine: multilevel compression deformities of the thoracic spine  -Neurosurgery was planning on intervention, however DVT was found, no surgical intervention done  - Pain management eval appreciated: dilaudid PRN, gabapentin, toradol PRN for breakthrough pain  - TLSO brace and PT eval  -spoke with NSx 1/29, plan is to hold off on kyphoplasty, she will need to see them outpatient in a month or so     #Obesity BMI 35- wt loss per PCP  #Hepatic cyst- f/u OP   #Hypothyroidism- cont synthroid  #h/o HTN - c/w amlodipine  #h/o DLD - c/w statin     Patient is medically stable and ready for discharge.

## 2023-01-30 NOTE — DISCHARGE NOTE PROVIDER - NSDCCPCAREPLAN_GEN_ALL_CORE_FT
PRINCIPAL DISCHARGE DIAGNOSIS  Diagnosis: 2019 novel coronavirus disease (COVID-19)  Assessment and Plan of Treatment: You had COVID-19 infection, we gave you remdesevir which prevented worsening of infection.  Coronavirus disease 2019 (COVID-19) is a respiratory illness  that can spread from person to person. The virus that causes  COVID-19 is a novel coronavirus that was first identified during  an investigation into an outbreak in [place], [place].  The virus that causes COVID-19 probably emerged from an  animal source, but is now spreading from person to person.  The virus is thought to spread mainly between people who  are in close contact with one another (within about 6 feet)  through respiratory droplets produced when an infected  person coughs or sneezes. It also may be possible that a person  can get COVID-19 by touching a surface or object that has  the virus on it and then touching their own mouth, nose, or  possibly their eyes, but this is not thought to be the main  way the virus spreads.  People can help protect themselves from respiratory illness with  everyday preventive actions.    • Avoid close contact with people who are sick.  • Avoid touching your eyes, nose, and mouth with  unwashed hands.  • Wash your hands often with soap and water for at least 20   seconds. Use an alcohol-based hand  that contains at  least 60% alcohol if soap and water are not available.      SECONDARY DISCHARGE DIAGNOSES  Diagnosis: Lumbar compression fracture  Assessment and Plan of Treatment: You had compression fracture in your spine for which you have to do physical therapy and take pain medications as intructed.  A fracture is a break in one of your bones. This can occur from a variety of injuries, especially traumatic ones. Symptoms include pain, bruising, or swelling. Do not use the injured limb. If a fracture is in one of the bones below your waist, do not put weight on that limb unless instructed to do so by your healthcare provider. Crutches or a cane may have been provided. A splint or cast may have been applied by your health care provider. Make sure to keep it dry and follow up with an orthopedist as instructed.  SEEK IMMEDIATE MEDICAL CARE IF YOU HAVE ANY OF THE FOLLOWING SYMPTOMS: numbness, tingling, increasing pain, or weakness in any part of the injured limb.    Diagnosis: Pulmonary embolism  Assessment and Plan of Treatment: During this hospitalization, you were diagnosed with a pulmonary embolsim. In your case, you have a  deep vein thrombosis (DVT) which is a blood clot in a large vein deep in a leg, arm, or elsewhere in the body. The clot can separate from the vein, travel to the lungs and cut off blood flow. This is a pulmonary embolism (PE). Pulmonary embolism is very serious. Both the prevention and the treatment are similar for DVT and PE.   To help prevent more blood clots from forming, please see your primary care doctor within one week of discharge, and please take your medicines exactly as instructed. Don’t skip doses. You have been prescribed Eliquis to thin the blood, so that more clots do not form.  Have all lab tests as recommended. This is very important when you take medicines to prevent blood clots. Make sure you stay active and walk for at least 30 minutes every day. When sitting for long periods of time, move your knees, ankles, feet, and toes.   Seek immediate medical care if you have pain, swelling, and redness in your leg, arm, or other body area. These symptoms may mean another blood clot. Also call your healthcare provider if you have signs and symptoms of bleeding, like blood in your urine, bleeding with bowel movements, or bleeding from the nose, gums, a cut, or vagina. Call 911 if you have symptoms of a blood clot in the lungs including: Chest pain, trouble breathing, coughing blood, fast heartbeat, heavy or uncontrolled bleeding.    Diagnosis: DVT, lower extremity  Assessment and Plan of Treatment:      [No Acute Distress] : no acute distress [Well Nourished] : well nourished [Well Developed] : well developed [Normal Sclera/Conjunctiva] : normal sclera/conjunctiva [PERRL] : pupils equal round and reactive to light [Normal Outer Ear/Nose] : the outer ears and nose were normal in appearance [Normal Oropharynx] : the oropharynx was normal [Supple] : supple [No Respiratory Distress] : no respiratory distress  [Normal Rate] : normal rate  [Clear to Auscultation] : lungs were clear to auscultation bilaterally [Regular Rhythm] : with a regular rhythm [Normal S1, S2] : normal S1 and S2 [Normal Posterior Cervical Nodes] : no posterior cervical lymphadenopathy [Normal Anterior Cervical Nodes] : no anterior cervical lymphadenopathy [Grossly Normal Strength/Tone] : grossly normal strength/tone [Coordination Grossly Intact] : coordination grossly intact [Normal Affect] : the affect was normal [Normal Insight/Judgement] : insight and judgment were intact

## 2023-01-31 LAB
ANION GAP SERPL CALC-SCNC: 13 MMOL/L — SIGNIFICANT CHANGE UP (ref 7–14)
BUN SERPL-MCNC: 10 MG/DL — SIGNIFICANT CHANGE UP (ref 10–20)
CALCIUM SERPL-MCNC: 8.9 MG/DL — SIGNIFICANT CHANGE UP (ref 8.4–10.5)
CHLORIDE SERPL-SCNC: 96 MMOL/L — LOW (ref 98–110)
CO2 SERPL-SCNC: 26 MMOL/L — SIGNIFICANT CHANGE UP (ref 17–32)
CREAT SERPL-MCNC: 0.8 MG/DL — SIGNIFICANT CHANGE UP (ref 0.7–1.5)
EGFR: 74 ML/MIN/1.73M2 — SIGNIFICANT CHANGE UP
GLUCOSE SERPL-MCNC: 83 MG/DL — SIGNIFICANT CHANGE UP (ref 70–99)
HCT VFR BLD CALC: 36.9 % — LOW (ref 37–47)
HGB BLD-MCNC: 11.8 G/DL — LOW (ref 12–16)
MCHC RBC-ENTMCNC: 30.5 PG — SIGNIFICANT CHANGE UP (ref 27–31)
MCHC RBC-ENTMCNC: 32 G/DL — SIGNIFICANT CHANGE UP (ref 32–37)
MCV RBC AUTO: 95.3 FL — SIGNIFICANT CHANGE UP (ref 81–99)
NRBC # BLD: 0 /100 WBCS — SIGNIFICANT CHANGE UP (ref 0–0)
PLATELET # BLD AUTO: 251 K/UL — SIGNIFICANT CHANGE UP (ref 130–400)
POTASSIUM SERPL-MCNC: 4.2 MMOL/L — SIGNIFICANT CHANGE UP (ref 3.5–5)
POTASSIUM SERPL-SCNC: 4.2 MMOL/L — SIGNIFICANT CHANGE UP (ref 3.5–5)
RBC # BLD: 3.87 M/UL — LOW (ref 4.2–5.4)
RBC # FLD: 13.7 % — SIGNIFICANT CHANGE UP (ref 11.5–14.5)
SODIUM SERPL-SCNC: 135 MMOL/L — SIGNIFICANT CHANGE UP (ref 135–146)
WBC # BLD: 8.32 K/UL — SIGNIFICANT CHANGE UP (ref 4.8–10.8)
WBC # FLD AUTO: 8.32 K/UL — SIGNIFICANT CHANGE UP (ref 4.8–10.8)

## 2023-01-31 PROCEDURE — 99232 SBSQ HOSP IP/OBS MODERATE 35: CPT

## 2023-01-31 RX ORDER — CYCLOBENZAPRINE HYDROCHLORIDE 10 MG/1
5 TABLET, FILM COATED ORAL THREE TIMES A DAY
Refills: 0 | Status: DISCONTINUED | OUTPATIENT
Start: 2023-01-31 | End: 2023-02-04

## 2023-01-31 RX ORDER — ACETAMINOPHEN 500 MG
650 TABLET ORAL EVERY 6 HOURS
Refills: 0 | Status: DISCONTINUED | OUTPATIENT
Start: 2023-01-31 | End: 2023-02-04

## 2023-01-31 RX ORDER — ONDANSETRON 8 MG/1
4 TABLET, FILM COATED ORAL ONCE
Refills: 0 | Status: COMPLETED | OUTPATIENT
Start: 2023-01-31 | End: 2023-01-31

## 2023-01-31 RX ADMIN — Medication 20 MILLIGRAM(S): at 05:36

## 2023-01-31 RX ADMIN — SENNA PLUS 2 TABLET(S): 8.6 TABLET ORAL at 21:08

## 2023-01-31 RX ADMIN — BUDESONIDE AND FORMOTEROL FUMARATE DIHYDRATE 2 PUFF(S): 160; 4.5 AEROSOL RESPIRATORY (INHALATION) at 08:13

## 2023-01-31 RX ADMIN — LIDOCAINE 1 PATCH: 4 CREAM TOPICAL at 11:48

## 2023-01-31 RX ADMIN — LIDOCAINE 1 PATCH: 4 CREAM TOPICAL at 19:52

## 2023-01-31 RX ADMIN — ATORVASTATIN CALCIUM 20 MILLIGRAM(S): 80 TABLET, FILM COATED ORAL at 21:08

## 2023-01-31 RX ADMIN — APIXABAN 10 MILLIGRAM(S): 2.5 TABLET, FILM COATED ORAL at 05:32

## 2023-01-31 RX ADMIN — ONDANSETRON 4 MILLIGRAM(S): 8 TABLET, FILM COATED ORAL at 10:05

## 2023-01-31 RX ADMIN — HYDROMORPHONE HYDROCHLORIDE 2 MILLIGRAM(S): 2 INJECTION INTRAMUSCULAR; INTRAVENOUS; SUBCUTANEOUS at 10:05

## 2023-01-31 RX ADMIN — LIDOCAINE 1 PATCH: 4 CREAM TOPICAL at 22:24

## 2023-01-31 RX ADMIN — AMLODIPINE BESYLATE 2.5 MILLIGRAM(S): 2.5 TABLET ORAL at 05:33

## 2023-01-31 RX ADMIN — BUDESONIDE AND FORMOTEROL FUMARATE DIHYDRATE 2 PUFF(S): 160; 4.5 AEROSOL RESPIRATORY (INHALATION) at 19:56

## 2023-01-31 RX ADMIN — APIXABAN 10 MILLIGRAM(S): 2.5 TABLET, FILM COATED ORAL at 17:14

## 2023-01-31 RX ADMIN — HYDROMORPHONE HYDROCHLORIDE 2 MILLIGRAM(S): 2 INJECTION INTRAMUSCULAR; INTRAVENOUS; SUBCUTANEOUS at 10:35

## 2023-01-31 RX ADMIN — LIDOCAINE 1 PATCH: 4 CREAM TOPICAL at 11:45

## 2023-01-31 RX ADMIN — GABAPENTIN 300 MILLIGRAM(S): 400 CAPSULE ORAL at 14:18

## 2023-01-31 RX ADMIN — Medication 650 MILLIGRAM(S): at 11:45

## 2023-01-31 RX ADMIN — PANTOPRAZOLE SODIUM 40 MILLIGRAM(S): 20 TABLET, DELAYED RELEASE ORAL at 05:33

## 2023-01-31 RX ADMIN — CYCLOBENZAPRINE HYDROCHLORIDE 5 MILLIGRAM(S): 10 TABLET, FILM COATED ORAL at 14:18

## 2023-01-31 RX ADMIN — Medication 650 MILLIGRAM(S): at 17:14

## 2023-01-31 RX ADMIN — GABAPENTIN 300 MILLIGRAM(S): 400 CAPSULE ORAL at 21:07

## 2023-01-31 RX ADMIN — GABAPENTIN 300 MILLIGRAM(S): 400 CAPSULE ORAL at 05:33

## 2023-01-31 RX ADMIN — Medication 25 MICROGRAM(S): at 05:33

## 2023-01-31 RX ADMIN — CYCLOBENZAPRINE HYDROCHLORIDE 5 MILLIGRAM(S): 10 TABLET, FILM COATED ORAL at 21:07

## 2023-01-31 NOTE — PROGRESS NOTE ADULT - TIME BILLING
Total time spent to complete patient's bedside assessment, physical examination, review medical chart including labs & imaging, discuss medical plan of care with housestaff was more than 25 minutes
Total time spent to complete patient's bedside assessment, physical examination, review medical chart including labs & imaging, discuss medical plan of care with housestaff was more than 35 minutes
Total time spent to complete patient's bedside assessment, physical examination, review medical chart including labs & imaging, discuss medical plan of care with housestaff was more than 25 minutes
Total time spent to complete patient's bedside assessment, physical examination, review medical chart including labs & imaging, discuss medical plan of care with housestaff was more than 35 minutes

## 2023-01-31 NOTE — PROGRESS NOTE ADULT - ASSESSMENT
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  Found in the hospital to have COVID.  Transferred here from [facility] for neurosurgery regarding compression fracture, found here to have extensive DVT.  Patient has an IVC filter, following up neuroendovascular surgery on whether patient can have AC.     #Acute hypoxemic respiratory failure  #COVID19 s/p RDV   -Left midlung bandlike opacity. Lung volumes. No focal consolidation otherwise. No pneumothorax or pleural effusion.  -2-3L home O2, back on baseline requirement; try to wean as tolerated   -s/p RDV, she was given one dose of decadron at the time, was not continued  -ECHO reviewed     #Recurrent DVT  #New PE, no right heart strain   #Hx/o cerebral aneurysm s/p coiling - no new deficits   -per vascular, no need for thrombectomy    - has an IVC filter, couldn't be on AC in the past because of aneurysm, per neuroendovascular can restart a/c  -now on Eliquis 10mg po bid for 7 days (ends 2/3; needs to start 5mg in PM), then will do 5mg po bid for at least 3 months    -MRA brain no bleeding  -will need to f/u with Neuroendovascular Dr. Montesinos in 2-3 months for aneurysm       #Focal hematoma in the right properitoneal space  -per vascular: benefit of anticoagulation supersedes risk. Would recommend restarting anticoagulation. Patient's Hgb actively stable, however, please continue to monitor  -per surgery: abdominal binder, IR if Hg/Hct drops due to expansion of the hematoma, supportive care    # Severe Thoracic compression fracture T12 complicated by worsening severe back pain, limited ROM, difficulty with ambulation due to pain 2/2 severe compression fx  -MRI T-spine: multilevel compression deformities of the thoracic spine  -Neurosurgery was planning on intervention, however DVT was found  - Pain management eval appreciated: dilaudid PRN, gabapentin, toradol PRN for breakthrough pain  - TLSO brace and PT eval  - CM/social work  -per neurosurgery, can do PT/OT/Rehab, if can walk then can discharge and f/u o/p; if not, then will need to do kyphoplasty - looks like patient did ok with both PT and rehab; will start d/c planning   -spoke with NSx 1/29, plan is to hold off on kyphoplasty, she will need to see them outpatient in a month or so     #Obesity BMI 35  - wt loss per PCP    #Hepatic cyst  - f/u OP     #Hypothyroidism  - cont synthroid    #h/o HTN   - c/w amlodipine     #Hypokalemia  -resolved     #h/o DLD   - c/w statin     Diet - DASH   DVT ppx: Eliquis 10mg po bid     Dispo: d/c planning to SNF; pending auth/bed ([facility]); keep d/c ready   Family: Spoke with daughter, Solange, at bedside, answered all questions and updated on patient's condition.

## 2023-01-31 NOTE — PROGRESS NOTE ADULT - SUBJECTIVE AND OBJECTIVE BOX
WILFREDO CHO  [facility] [address] ([facility])      Patient is a 82y old  Female who presents with a chief complaint of Upper back pain (30 Jan 2023 21:20)        Interval events:  Patient seen and examined at bedside. No acute events overnight. Says back pain slightly improved. Not having chest pain anymore. Pending d/c.     -PMHx: Hypothyroid    Hyperlipemia    Brain aneurysm    Compression fracture of spine    Presence of IVC filter      -PSHx:S/P appendectomy    S/P coil embolization of cerebral aneurysm            REVIEW OF SYSTEMS:  CONSTITUTIONAL: No fever, weight loss, or fatigue  RESPIRATORY: No cough, wheezing, chills or hemoptysis; No shortness of breath  CARDIOVASCULAR: No chest pain, palpitations, dizziness, or leg swelling  GASTROINTESTINAL: No abdominal or epigastric pain. No nausea, vomiting, or hematemesis; No diarrhea or constipation. No melena or hematochezia.  NEUROLOGICAL: No headaches  LYMPH NODES: No enlarged glands  MUSCULOSKELETAL: as above       T(C): , Max: 36.8 (01-30-23 @ 16:58)  HR: 66 (01-31-23 @ 08:45)  BP: 108/66 (01-31-23 @ 08:45)  RR: 18 (01-31-23 @ 08:45)  SpO2: 97% (01-31-23 @ 08:45)  CAPILLARY BLOOD GLUCOSE        PHYSICAL EXAM:  GENERAL: NAD, obese, lying in bed comfortably, on 2L NC  HEAD:  Atraumatic, Normocephalic  EYES: EOMI, PERRLA, conjunctiva and sclera clear  ENT: Moist mucous membranes  NECK: Supple, No JVD     CHEST/LUNG: CTABL; mildly tender to palpation on front of right side of chest on 10th rib  HEART: Regular rate and rhythm; No murmurs, rubs, or gallops  ABDOMEN: Bowel sounds present; Soft, Nontender, Nondistended. No hepatomegaly  EXTREMITIES:  2+ Peripheral Pulses, brisk capillary refill. Trace b/l LE edema.   NERVOUS SYSTEM:  Alert & Oriented X3, speech clear. No deficits   MSK: FROM all 4 extremities, full and equal strength   SKIN: No rashes or lesions       Spoke with consultants: Y[x ] N [ ]     LABS:          11.8  8.32  )-------(251          36.9  N=--  L=--  MCV=95.3    135|96<L>|10  ------------------<83  4.2|26|0.8  eGFR:--  Ca:8.9            Microbiology:      RADIOLOGY & ADDITIONAL TESTS:        Medications:  acetaminophen     Tablet .. 650 milliGRAM(s) Oral every 6 hours  amLODIPine   Tablet 2.5 milliGRAM(s) Oral daily  apixaban 10 milliGRAM(s) Oral every 12 hours  atorvastatin 20 milliGRAM(s) Oral at bedtime  budesonide 160 MICROgram(s)/formoterol 4.5 MICROgram(s) Inhaler 2 Puff(s) Inhalation two times a day  cyclobenzaprine 5 milliGRAM(s) Oral three times a day  furosemide    Tablet 20 milliGRAM(s) Oral daily  gabapentin 300 milliGRAM(s) Oral three times a day  HYDROmorphone   Tablet 2 milliGRAM(s) Oral every 4 hours PRN  levothyroxine 25 MICROGram(s) Oral daily  lidocaine   4% Patch 1 Patch Transdermal daily  lidocaine   4% Patch 1 Patch Transdermal daily  pantoprazole    Tablet 40 milliGRAM(s) Oral before breakfast  polyethylene glycol 3350 17 Gram(s) Oral daily  senna 2 Tablet(s) Oral at bedtime  tiotropium 2.5 MICROgram(s) Inhaler 2 Puff(s) Inhalation daily

## 2023-01-31 NOTE — CHART NOTE - NSCHARTNOTEFT_GEN_A_CORE
Brief Nutrition Note: reviewed pt related to length of stay. Pt on DASH/TLC, good PO intake, discharge is pending placement @ [facility]. Pt with no nutrition questions or concerns at this time. No nutrition interventions indicated. Will re-screen x 7 days.    or TEAMS

## 2023-02-01 PROCEDURE — 99232 SBSQ HOSP IP/OBS MODERATE 35: CPT

## 2023-02-01 RX ADMIN — POLYETHYLENE GLYCOL 3350 17 GRAM(S): 17 POWDER, FOR SOLUTION ORAL at 12:00

## 2023-02-01 RX ADMIN — Medication 25 MICROGRAM(S): at 05:36

## 2023-02-01 RX ADMIN — GABAPENTIN 300 MILLIGRAM(S): 400 CAPSULE ORAL at 13:54

## 2023-02-01 RX ADMIN — CYCLOBENZAPRINE HYDROCHLORIDE 5 MILLIGRAM(S): 10 TABLET, FILM COATED ORAL at 05:35

## 2023-02-01 RX ADMIN — GABAPENTIN 300 MILLIGRAM(S): 400 CAPSULE ORAL at 05:35

## 2023-02-01 RX ADMIN — CYCLOBENZAPRINE HYDROCHLORIDE 5 MILLIGRAM(S): 10 TABLET, FILM COATED ORAL at 21:12

## 2023-02-01 RX ADMIN — Medication 650 MILLIGRAM(S): at 05:36

## 2023-02-01 RX ADMIN — AMLODIPINE BESYLATE 2.5 MILLIGRAM(S): 2.5 TABLET ORAL at 05:36

## 2023-02-01 RX ADMIN — TIOTROPIUM BROMIDE 2 PUFF(S): 18 CAPSULE ORAL; RESPIRATORY (INHALATION) at 13:55

## 2023-02-01 RX ADMIN — Medication 650 MILLIGRAM(S): at 17:13

## 2023-02-01 RX ADMIN — Medication 650 MILLIGRAM(S): at 12:57

## 2023-02-01 RX ADMIN — Medication 20 MILLIGRAM(S): at 05:49

## 2023-02-01 RX ADMIN — PANTOPRAZOLE SODIUM 40 MILLIGRAM(S): 20 TABLET, DELAYED RELEASE ORAL at 05:36

## 2023-02-01 RX ADMIN — HYDROMORPHONE HYDROCHLORIDE 2 MILLIGRAM(S): 2 INJECTION INTRAMUSCULAR; INTRAVENOUS; SUBCUTANEOUS at 23:57

## 2023-02-01 RX ADMIN — GABAPENTIN 300 MILLIGRAM(S): 400 CAPSULE ORAL at 21:11

## 2023-02-01 RX ADMIN — APIXABAN 10 MILLIGRAM(S): 2.5 TABLET, FILM COATED ORAL at 05:36

## 2023-02-01 RX ADMIN — LIDOCAINE 1 PATCH: 4 CREAM TOPICAL at 11:58

## 2023-02-01 RX ADMIN — ATORVASTATIN CALCIUM 20 MILLIGRAM(S): 80 TABLET, FILM COATED ORAL at 21:11

## 2023-02-01 RX ADMIN — CYCLOBENZAPRINE HYDROCHLORIDE 5 MILLIGRAM(S): 10 TABLET, FILM COATED ORAL at 13:54

## 2023-02-01 RX ADMIN — Medication 650 MILLIGRAM(S): at 23:56

## 2023-02-01 RX ADMIN — BUDESONIDE AND FORMOTEROL FUMARATE DIHYDRATE 2 PUFF(S): 160; 4.5 AEROSOL RESPIRATORY (INHALATION) at 13:55

## 2023-02-01 RX ADMIN — SENNA PLUS 2 TABLET(S): 8.6 TABLET ORAL at 21:11

## 2023-02-01 RX ADMIN — APIXABAN 10 MILLIGRAM(S): 2.5 TABLET, FILM COATED ORAL at 17:12

## 2023-02-01 RX ADMIN — Medication 650 MILLIGRAM(S): at 11:57

## 2023-02-01 RX ADMIN — LIDOCAINE 1 PATCH: 4 CREAM TOPICAL at 11:57

## 2023-02-01 NOTE — PROGRESS NOTE ADULT - SUBJECTIVE AND OBJECTIVE BOX
WILFREDO CHO  82y, Female  Allergy: Compazine (Unknown)    Hospital Day: 16d    Patient seen and examined earlier today. No acute events overnight.  reports having back pain.      PMH/PSH:  PAST MEDICAL & SURGICAL HISTORY:  Hypothyroid      Hyperlipemia      Brain aneurysm      Compression fracture of spine      Presence of IVC filter      S/P appendectomy      S/P coil embolization of cerebral aneurysm          LAST 24-Hr EVENTS:    VITALS:  T(F): 96.9 (02-01-23 @ 08:29), Max: 97.2 (02-01-23 @ 04:40)  HR: 78 (02-01-23 @ 08:29)  BP: 115/71 (02-01-23 @ 08:29) (103/61 - 115/71)  RR: 18 (02-01-23 @ 08:29)  SpO2: 98% (02-01-23 @ 08:29)          TESTS & MEASUREMENTS:  Weight/BMI      01-30-23 @ 07:01  -  01-31-23 @ 07:00  --------------------------------------------------------  IN: 100 mL / OUT: 1350 mL / NET: -1250 mL    01-31-23 @ 07:01  -  02-01-23 @ 07:00  --------------------------------------------------------  IN: 700 mL / OUT: 1100 mL / NET: -400 mL                            11.8   8.32  )-----------( 251      ( 31 Jan 2023 05:57 )             36.9         01-31    135  |  96<L>  |  10  ----------------------------<  83  4.2   |  26  |  0.8    Ca    8.9      31 Jan 2023 05:57                      Serum Pro-Brain Natriuretic Peptide: 224 pg/mL (01-24-23 @ 13:30)    COVID-19 PCR: Detected (01-29-23 @ 15:48)                RADIOLOGY, ECG, & ADDITIONAL TESTS:  12 Lead ECG:   Ventricular Rate 95 BPM    Atrial Rate 95 BPM    P-R Interval 152 ms    QRS Duration 76 ms    Q-T Interval 354 ms    QTC Calculation(Bazett) 444 ms    P Axis 36 degrees    R Axis -40 degrees    T Axis 25 degrees    Diagnosis Line Normal sinus rhythm  Left axis deviation    Possible Anterior infarct , age undetermined  Abnormal ECG    Confirmed by MACKENZIE HOPE MD ([id number]) on 1/27/2023 7:00:32 AM (01-27-23 @ 02:52)    CT Angio Chest PE Protocol w/ IV Cont:   ACC: [id number] EXAM:  CT ANGIO CHEST PULM ART [facility]   ORDERED BY: IRIS SCHUSTER     PROCEDURE DATE:  01/24/2023          INTERPRETATION:  Reason for study:  Possible pulmonary embolism.         Technique: Approximately 80 cc of Optiray 320 was administered   intravenously, followed by approximate 50 cc saline flush.    Multiple transaxial images of the thorax was obtained, utilizing   pulmonary embolism protocol (in order to facilitate opacification of the   pulmonary arterial tree).  20 cc of contrast material was discarded.     MIP, Coronal and sagittal reformatted images were performed to better   evaluate anatomic relationships and for possible preoperative planning.        Comparison: CT thorax 12/26/2022.      Findings: Motion artifact limits parenchymal assessment.    Pulmonary Arteries:Interval development, occlusive emboli within the   proximal right lower lobe pulmonary artery is noted (4/).    Main pulmonary artery segment measures 3.1 cm. RV/LV ratio less than 1.   (Noheart strain)    Tubes/Lines: none  Mediastinum/hilum: No adenopathy or mass.  .     Heart/Great Vessels:No pericardial effusions. Ascending aorta normal in   caliber..    Abdomen: Hepatic steatosis. Stable approximate 7 cm cyst, right hepatic   dome.    Bones and soft tissues: Severe degenerative changes, thoracic spine    Lungs, Pleura, and Airways:   Patent central tracheobronchial tree. No effusions or parenchymal masses   are noted. Stable reticular opacities most pronounced within left upper   lobe are noted.      Pulmonary nodules:  No suspicious pulmonary nodules.    IMPRESSION:    Since  12/26/2022      Interval development, occlusive emboli within the proximal right lower   lobe pulmonary artery (4/). No right heart strain.    Spoke with Dr. SUMNER,  on 1/24/2023 4:30 PM with readback.    --- End of Report ---            LATESHA HILL MD; Attending Radiologist  This document has been electronically signed. Jan 24 2023  4:31PM (01-24-23 @ 16:02)    RECENT DIAGNOSTIC ORDERS:      MEDICATIONS:  MEDICATIONS  (STANDING):  acetaminophen     Tablet .. 650 milliGRAM(s) Oral every 6 hours  amLODIPine   Tablet 2.5 milliGRAM(s) Oral daily  apixaban 10 milliGRAM(s) Oral every 12 hours  atorvastatin 20 milliGRAM(s) Oral at bedtime  budesonide 160 MICROgram(s)/formoterol 4.5 MICROgram(s) Inhaler 2 Puff(s) Inhalation two times a day  cyclobenzaprine 5 milliGRAM(s) Oral three times a day  furosemide    Tablet 20 milliGRAM(s) Oral daily  gabapentin 300 milliGRAM(s) Oral three times a day  levothyroxine 25 MICROGram(s) Oral daily  lidocaine   4% Patch 1 Patch Transdermal daily  lidocaine   4% Patch 1 Patch Transdermal daily  pantoprazole    Tablet 40 milliGRAM(s) Oral before breakfast  polyethylene glycol 3350 17 Gram(s) Oral daily  senna 2 Tablet(s) Oral at bedtime  tiotropium 2.5 MICROgram(s) Inhaler 2 Puff(s) Inhalation daily    MEDICATIONS  (PRN):  HYDROmorphone   Tablet 2 milliGRAM(s) Oral every 4 hours PRN Severe Pain (7 - 10)      HOME MEDICATIONS:  acetaminophen 325 mg oral tablet (01-16)  ALBUTEROL HFA 90 MCG INHALER (01-16)  AMLODIPINE BESYLATE 2.5 MG TAB (01-30)  apixaban 5 mg oral tablet (01-30)  atorvastatin 20 mg oral tablet (01-30)  furosemide 20 mg oral tablet (01-30)  gabapentin 300 mg oral capsule (01-30)  HYDROmorphone 2 mg oral tablet (01-30)  LEVOTHYROXINE 25 MCG TABLET (01-16)  lidocaine 4% topical film (01-16)  pantoprazole 40 mg oral delayed release tablet (01-30)  polyethylene glycol 3350 oral powder for reconstitution (01-16)  senna leaf extract oral tablet (01-16)  Symbicort 160 mcg-4.5 mcg/inh inhalation aerosol (01-16)  tiotropium 2.5 mcg/inh inhalation aerosol (01-16)      PHYSICAL EXAM:  GENERAL: NAD, obese, lying in bed comfortably, on 2L NC  HEAD:  Atraumatic, Normocephalic  EYES: EOMI, PERRLA, conjunctiva and sclera clear  ENT: Moist mucous membranes  NECK: Supple, No JVD     CHEST/LUNG: CTABL; mildly tender to palpation on front of right side of chest on 10th rib  HEART: Regular rate and rhythm; No murmurs, rubs, or gallops  ABDOMEN: Bowel sounds present; Soft, Nontender, Nondistended. No hepatomegaly  EXTREMITIES:  2+ Peripheral Pulses, brisk capillary refill. Trace b/l LE edema.   NERVOUS SYSTEM:  Alert & Oriented X3, speech clear. No deficits   MSK: FROM all 4 extremities, full and equal strength   SKIN: No rashes or lesions

## 2023-02-01 NOTE — PROGRESS NOTE ADULT - PROBLEM SELECTOR PLAN 1
+longstanding history of chronic opioid therapy, but none since ~2017; patient may still have some opioid tolerance. Low risk of opioid abuse per ORT.  1) Continue hydromorphone PO 2mg Q4h prn; may continue in rehab facility, but would not recommend chronic use  2) Continue acetaminophen 650mg Q6h standing  3) Continue cyclobenzaprine 5mg TID standing  4) Continue gabapentin 300mg TID  5) Continue lidocaine patch  6) Continue miralax, senna

## 2023-02-01 NOTE — PROGRESS NOTE ADULT - SUBJECTIVE AND OBJECTIVE BOX
Pain Medicine Follow Up Visit      Subjective  Patient endorses having some improvement of pain in the upper back. She states that hydromorphone PO does help with her pain and does not cause any side effects, including sedation, nausea, or constipation. The patient denies having any radiation of the pain to the lower extremities.       Current Medication Regimen  acetaminophen     Tablet .. 650 milliGRAM(s) Oral every 6 hours  amLODIPine   Tablet 2.5 milliGRAM(s) Oral daily  apixaban 10 milliGRAM(s) Oral every 12 hours  atorvastatin 20 milliGRAM(s) Oral at bedtime  budesonide 160 MICROgram(s)/formoterol 4.5 MICROgram(s) Inhaler 2 Puff(s) Inhalation two times a day  cyclobenzaprine 5 milliGRAM(s) Oral three times a day  furosemide    Tablet 20 milliGRAM(s) Oral daily  gabapentin 300 milliGRAM(s) Oral three times a day  HYDROmorphone   Tablet 2 milliGRAM(s) Oral every 4 hours PRN  levothyroxine 25 MICROGram(s) Oral daily  lidocaine   4% Patch 1 Patch Transdermal daily  lidocaine   4% Patch 1 Patch Transdermal daily  pantoprazole    Tablet 40 milliGRAM(s) Oral before breakfast  polyethylene glycol 3350 17 Gram(s) Oral daily  senna 2 Tablet(s) Oral at bedtime  tiotropium 2.5 MICROgram(s) Inhaler 2 Puff(s) Inhalation daily        Allergies  Compazine (Unknown)          Physical Exam  T(C): 36.1 (02-01-23 @ 08:29), Max: 36.2 (02-01-23 @ 04:40)  HR: 78 (02-01-23 @ 08:29) (71 - 85)  BP: 115/71 (02-01-23 @ 08:29) (103/61 - 115/71)  RR: 18 (02-01-23 @ 08:29) (18 - 18)  SpO2: 98% (02-01-23 @ 08:29) (97% - 99%)  Gen: NAD  Eyes: no glasses or scleral icterus  Head: Normocephalic / Atraumatic  CV: no JVD  Lungs: nonlabored breathing  Abdomen: nondistended, soft  : no arnold catheter in place  Back: +tenderness to palpation in the midline in the upper thoracic rregion  Neuro: AOx3, Cranial nerves intact  Psych: normal affect      Labs  CBC  8.32 > 11.8 g/dL / 36.9 % < 251 K/uL        Imaging  CT Chest angio (1/27/2023)  FINDINGS CHEST:    TUBES AND LINES: None.    HEART AND VESSELS: The heart size is within normal limits. There is no   pericardial effusion.    Aortic calcifications are noted. There is no evidence of thoracic aortic   aneurysm or dissection. No intimal flap or double lumen.    Normal caliber aorta and pulmonary artery. No evidence of central   pulmonary embolism.    The brachiocephalic vessels are unremarkable.    MEDIASTINUM: There are no suspicious mediastinal, hilar or axillary lymph   nodes. The visualized portion of the thyroid gland is unremarkable.    AIRWAYS, LUNGS AND PLEURA: The central tracheobronchial tree is patent.   Stable reticular opacities in both upper lobes and at the lung bases.   There is an unchanged area of atelectasis / consolidation in the lower   left upper lobe. A new area of atelectasis / consolidation at the   posterior aspect of the right upper lobe. A short-term follow-up scan in   3-6 months is recommended to assess stability or resolution.  There are   mild dependent atelectatic changes at the lung bases. There is no pleural   effusion. There is no pneumothorax.    CHEST WALL/BONES/SOFT TISSUES:    FINDINGS ABDOMEN AND PELVIS:    HEPATIC: The liver is normal in appearance with no evidence of mass or   bile duct dilatation. Hepatic steatosis. Stable cyst in the dome of the   liver. The portal vein is patent.    BILIARY: No calcified gallstones are noted.    SPLEEN: Unremarkable.    PANCREAS: Fatty atrophy of the pancreas.. No evidence of mass or   pancreatitis.    ADRENAL GLANDS: Unremarkable.    KIDNEYS: No evidence of hydronephrosis, calcified stones, or solid renal   mass. Mild cortical scarring of both kidneys. Right subcentimeter   hypodense cortical lesions too small to characterize.    ABDOMINOPELVIC NODES: Unremarkable.    PELVIC ORGANS: No evidence of pelvic mass, lymphadenopathy, or fluid   collection.    BLADDER: Unremarkable.    PERITONEUM/MESENTERY/BOWEL: No evidence of obstruction, colitis,   inflammatory process, or ascites within the abdomen or pelvis.    BONES/SOFT TISSUES: Osteopenia. Degenerative changes of the spine are   noted. Chronic fractures left superior and inferior pubic rami. Chronic   right anterolateral 4th, 6th, 7th and 8th rib fractures.    There is a crescent shaped hyperdense collection (50 Hounsfield units)   deep to the musculature of the right sided abdominal wall, anterior to   the right 10th rib, and lateral to the retroperitoneal fat. Finding is   compatible with a focal hematoma in the right properitoneal space (8/86;   9/256)    VASCULAR: No evidence of abdominal aortic aneurysm or dissection. No   intimal flap or double lumen. The celiac axis, the superior mesenteric   artery, the inferior mesenteric artery, and the renal arteries are patent   without flow-limiting stenosis. Single renal arteries are seen   bilaterally.    IVC filter.        IMPRESSION:    No evidence of thoracic or abdominal aortic aneurysm or dissection.    There is a crescent shaped hyperdense collection (50 Hounsfield units)   deep to the musculature of the right sided abdominal wall, anterior to   the right 10th rib, and lateral to the retroperitoneal fat. Finding is   compatible with a focal hematoma in the right properitoneal space (8/86;   9/256)

## 2023-02-01 NOTE — PROGRESS NOTE ADULT - ASSESSMENT
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  Found in the hospital to have COVID.  Transferred here from [facility] for neurosurgery regarding compression fracture, found here to have extensive DVT.  Patient has an IVC filter, following up neuroendovascular surgery on whether patient can have AC.     #Acute hypoxemic respiratory failure  #COVID19 s/p RDV   -Left midlung bandlike opacity. Lung volumes. No focal consolidation otherwise. No pneumothorax or pleural effusion.  -2-3L home O2, back on baseline requirement; try to wean as tolerated   -s/p RDV, she was given one dose of decadron at the time, was not continued  -ECHO reviewed     #Recurrent DVT  #New PE, no right heart strain   #Hx/o cerebral aneurysm s/p coiling - no new deficits   -per vascular, no need for thrombectomy    - has an IVC filter, couldn't be on AC in the past because of aneurysm, per neuroendovascular can restart a/c  -now on Eliquis 10mg po bid for 7 days (ends 2/3; needs to start 5mg in PM), then will do 5mg po bid for at least 3 months    -MRA brain no bleeding  -will need to f/u with Neuroendovascular Dr. Montesinos in 2-3 months for aneurysm       #Focal hematoma in the right properitoneal space  -per vascular: benefit of anticoagulation supersedes risk. Would recommend restarting anticoagulation. Patient's Hgb actively stable, however, please continue to monitor  -per surgery: abdominal binder, IR if Hg/Hct drops due to expansion of the hematoma, supportive care    # Severe Thoracic compression fracture T12 complicated by worsening severe back pain, limited ROM, difficulty with ambulation due to pain 2/2 severe compression fx  -MRI T-spine: multilevel compression deformities of the thoracic spine  -Neurosurgery was planning on intervention, however DVT was found  - Pain management eval appreciated: dilaudid PRN, gabapentin, toradol PRN for breakthrough pain  - TLSO brace and PT eval  - CM/social work  -per neurosurgery, can do PT/OT/Rehab, if can walk then can discharge and f/u o/p; if not, then will need to do kyphoplasty - looks like patient did ok with both PT and rehab; will start d/c planning   -spoke with NSx 1/29, plan is to hold off on kyphoplasty, she will need to see them outpatient in a month or so     #Obesity BMI 35  - wt loss per PCP    #Hepatic cyst  - f/u OP     #Hypothyroidism  - cont synthroid    #h/o HTN   - c/w amlodipine     #Hypokalemia  -resolved     #h/o DLD   - c/w statin     Diet - DASH   DVT ppx: Eliquis 10mg po bid     Dispo: d/c planning to SNF; pending auth/bed ([facility] vs [place]); keep d/c ready   Family: Spoke with daughter, Solange, at bedside, answered all questions and updated on patient's condition.

## 2023-02-02 PROCEDURE — 99232 SBSQ HOSP IP/OBS MODERATE 35: CPT

## 2023-02-02 RX ADMIN — PANTOPRAZOLE SODIUM 40 MILLIGRAM(S): 20 TABLET, DELAYED RELEASE ORAL at 05:23

## 2023-02-02 RX ADMIN — APIXABAN 10 MILLIGRAM(S): 2.5 TABLET, FILM COATED ORAL at 17:04

## 2023-02-02 RX ADMIN — Medication 25 MICROGRAM(S): at 05:21

## 2023-02-02 RX ADMIN — LIDOCAINE 1 PATCH: 4 CREAM TOPICAL at 11:10

## 2023-02-02 RX ADMIN — ATORVASTATIN CALCIUM 20 MILLIGRAM(S): 80 TABLET, FILM COATED ORAL at 21:15

## 2023-02-02 RX ADMIN — BUDESONIDE AND FORMOTEROL FUMARATE DIHYDRATE 2 PUFF(S): 160; 4.5 AEROSOL RESPIRATORY (INHALATION) at 21:16

## 2023-02-02 RX ADMIN — GABAPENTIN 300 MILLIGRAM(S): 400 CAPSULE ORAL at 05:22

## 2023-02-02 RX ADMIN — HYDROMORPHONE HYDROCHLORIDE 2 MILLIGRAM(S): 2 INJECTION INTRAMUSCULAR; INTRAVENOUS; SUBCUTANEOUS at 11:40

## 2023-02-02 RX ADMIN — HYDROMORPHONE HYDROCHLORIDE 2 MILLIGRAM(S): 2 INJECTION INTRAMUSCULAR; INTRAVENOUS; SUBCUTANEOUS at 05:20

## 2023-02-02 RX ADMIN — TIOTROPIUM BROMIDE 2 PUFF(S): 18 CAPSULE ORAL; RESPIRATORY (INHALATION) at 08:12

## 2023-02-02 RX ADMIN — HYDROMORPHONE HYDROCHLORIDE 2 MILLIGRAM(S): 2 INJECTION INTRAMUSCULAR; INTRAVENOUS; SUBCUTANEOUS at 11:10

## 2023-02-02 RX ADMIN — APIXABAN 10 MILLIGRAM(S): 2.5 TABLET, FILM COATED ORAL at 05:21

## 2023-02-02 RX ADMIN — CYCLOBENZAPRINE HYDROCHLORIDE 5 MILLIGRAM(S): 10 TABLET, FILM COATED ORAL at 13:28

## 2023-02-02 RX ADMIN — Medication 650 MILLIGRAM(S): at 11:40

## 2023-02-02 RX ADMIN — Medication 20 MILLIGRAM(S): at 05:54

## 2023-02-02 RX ADMIN — AMLODIPINE BESYLATE 2.5 MILLIGRAM(S): 2.5 TABLET ORAL at 05:22

## 2023-02-02 RX ADMIN — HYDROMORPHONE HYDROCHLORIDE 2 MILLIGRAM(S): 2 INJECTION INTRAMUSCULAR; INTRAVENOUS; SUBCUTANEOUS at 22:10

## 2023-02-02 RX ADMIN — Medication 650 MILLIGRAM(S): at 17:34

## 2023-02-02 RX ADMIN — GABAPENTIN 300 MILLIGRAM(S): 400 CAPSULE ORAL at 13:28

## 2023-02-02 RX ADMIN — HYDROMORPHONE HYDROCHLORIDE 2 MILLIGRAM(S): 2 INJECTION INTRAMUSCULAR; INTRAVENOUS; SUBCUTANEOUS at 05:42

## 2023-02-02 RX ADMIN — CYCLOBENZAPRINE HYDROCHLORIDE 5 MILLIGRAM(S): 10 TABLET, FILM COATED ORAL at 21:15

## 2023-02-02 RX ADMIN — Medication 650 MILLIGRAM(S): at 17:04

## 2023-02-02 RX ADMIN — CYCLOBENZAPRINE HYDROCHLORIDE 5 MILLIGRAM(S): 10 TABLET, FILM COATED ORAL at 05:22

## 2023-02-02 RX ADMIN — Medication 650 MILLIGRAM(S): at 05:22

## 2023-02-02 RX ADMIN — LIDOCAINE 1 PATCH: 4 CREAM TOPICAL at 11:11

## 2023-02-02 RX ADMIN — Medication 650 MILLIGRAM(S): at 05:42

## 2023-02-02 RX ADMIN — Medication 650 MILLIGRAM(S): at 11:10

## 2023-02-02 RX ADMIN — GABAPENTIN 300 MILLIGRAM(S): 400 CAPSULE ORAL at 21:15

## 2023-02-02 NOTE — PROGRESS NOTE ADULT - SUBJECTIVE AND OBJECTIVE BOX
WILFREDO CHO 82y Female  MRN#: [id number]   Hospital Day: 17d    SUBJECTIVE  Patient is a 82y old Female who presents with a chief complaint of Patient is a 82y old  Female who presents with a chief complaint of Patient is a 82y old  Female who presents with a chief complaint of Upper back pain (30 Jan 2023 21:20) (01 Feb 2023 16:00) (02 Feb 2023 14:46)  Currently admitted to medicine with the primary diagnosis of 2019 novel coronavirus disease (COVID-19)      INTERVAL HPI AND OVERNIGHT EVENTS:  Patient was examined and seen at bedside. This morning she is resting comfortably in bed and reports no issues or overnight events.    OBJECTIVE  PAST MEDICAL & SURGICAL HISTORY  Hypothyroid    Hyperlipemia    Brain aneurysm    Compression fracture of spine    Presence of IVC filter    S/P appendectomy    S/P coil embolization of cerebral aneurysm      ALLERGIES:  Compazine (Unknown)    MEDICATIONS:  STANDING MEDICATIONS  acetaminophen     Tablet .. 650 milliGRAM(s) Oral every 6 hours  amLODIPine   Tablet 2.5 milliGRAM(s) Oral daily  apixaban 10 milliGRAM(s) Oral every 12 hours  atorvastatin 20 milliGRAM(s) Oral at bedtime  budesonide 160 MICROgram(s)/formoterol 4.5 MICROgram(s) Inhaler 2 Puff(s) Inhalation two times a day  cyclobenzaprine 5 milliGRAM(s) Oral three times a day  furosemide    Tablet 20 milliGRAM(s) Oral daily  gabapentin 300 milliGRAM(s) Oral three times a day  levothyroxine 25 MICROGram(s) Oral daily  lidocaine   4% Patch 1 Patch Transdermal daily  lidocaine   4% Patch 1 Patch Transdermal daily  pantoprazole    Tablet 40 milliGRAM(s) Oral before breakfast  polyethylene glycol 3350 17 Gram(s) Oral daily  senna 2 Tablet(s) Oral at bedtime  tiotropium 2.5 MICROgram(s) Inhaler 2 Puff(s) Inhalation daily    PRN MEDICATIONS  HYDROmorphone   Tablet 2 milliGRAM(s) Oral every 4 hours PRN      Vitals:  VITAL SIGNS: Last 24 Hours  T(C): 36.2 (02 Feb 2023 08:42), Max: 36.4 (01 Feb 2023 21:17)  T(F): 97.2 (02 Feb 2023 08:42), Max: 97.6 (01 Feb 2023 21:17)  HR: 66 (02 Feb 2023 08:42) (66 - 87)  BP: 106/63 (02 Feb 2023 08:42) (106/63 - 132/71)  BP(mean): --  RR: 18 (02 Feb 2023 08:42) (18 - 18)  SpO2: 93% (02 Feb 2023 08:42) (93% - 100%)  Orthostatic Vitals:  Orthostatic VS    I's&O's:  I&O's Summary    01 Feb 2023 07:01  -  02 Feb 2023 07:00  --------------------------------------------------------  IN: 0 mL / OUT: 400 mL / NET: -400 mL    02 Feb 2023 07:01  -  02 Feb 2023 15:56  --------------------------------------------------------  IN: 0 mL / OUT: 450 mL / NET: -450 mL      PHYSICAL EXAM:  GENERAL: NAD, obese, lying in bed comfortably, on 2L NC  HEAD:  Atraumatic, Normocephalic  EYES: EOMI, PERRLA, conjunctiva and sclera clear  ENT: Moist mucous membranes  NECK: Supple, No JVD     CHEST/LUNG: CTABL; mildly tender to palpation on front of right side of chest on 10th rib  HEART: Regular rate and rhythm; No murmurs, rubs, or gallops  ABDOMEN: Bowel sounds present; Soft, Nontender, Nondistended. No hepatomegaly  EXTREMITIES:  2+ Peripheral Pulses, brisk capillary refill. Trace b/l LE edema.   NERVOUS SYSTEM:  Alert & Oriented X3, speech clear. No deficits   MSK: FROM all 4 extremities, full and equal strength   SKIN: No rashes or lesions         CBC:    CMP:        POCS:  CAPILLARY BLOOD GLUCOSE                        COVID-19 PCR: Detected (29 Jan 2023 15:48)  COVID-19 PCR: Detected (21 Jan 2023 12:49)  COVID-19 PCR: NotDetec (07 Jan 2023 10:00)  COVID-19 PCR: Sourav (04 Jan 2023 10:09)  SARS-CoV-2: Sourav (28 Dec 2022 10:50)

## 2023-02-02 NOTE — PROGRESS NOTE ADULT - ASSESSMENT
Imp: rehab of T12 comp fx / covid-19+ / ILD on home O2, hypothyroidism   Plan: continue bedside therapy as tolerated           adequate pain management          Agree with STR placement once medically stable

## 2023-02-02 NOTE — PROGRESS NOTE ADULT - ASSESSMENT
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  Found in the hospital to have COVID.  Transferred here from [facility] for neurosurgery regarding compression fracture, found here to have extensive DVT.  Patient has an IVC filter, following up neuroendovascular surgery on whether patient can have AC.     #Acute hypoxemic respiratory failure  #COVID19 s/p RDV   -Left midlung bandlike opacity. Lung volumes. No focal consolidation otherwise. No pneumothorax or pleural effusion.  -2-3L home O2, back on baseline requirement; try to wean as tolerated   -s/p RDV, she was given one dose of decadron at the time, was not continued  -< from: TTE Echo Complete w/o Contrast w/ Doppler (01.26.23 @ 12:48) >   1. Left ventricular ejection fraction, by visual estimation, is 55 to   60%.   2. Normal global left ventricular systolic function.   3. Spectral Doppler shows impaired relaxation pattern of left   ventricular myocardial filling (Grade I diastolic dysfunction).   4. Mildly enlarged left atrium.   5. Normal right atrial size.   6. Trace mitral valve regurgitation.   7. Mild tricuspid regurgitation.   8. Estimated pulmonary artery systolic pressure is 37.9 mmHg assuming a   right atrial pressure of 8 mmHg, which is consistent with borderline   pulmonary hypertension.    #Recurrent DVT  #New PE, no right heart strain   #Hx/o cerebral aneurysm s/p coiling - no new deficits   -per vascular, no need for thrombectomy    - has an IVC filter, couldn't be on AC in the past because of aneurysm, per neuroendovascular can restart a/c  -now on Eliquis 10mg po bid for 7 days (ends 2/3; needs to start 5mg in PM), then will do 5mg po bid for at least 3 months    -MRA brain no bleeding  -will need to f/u with Neuroendovascular Dr. Montesinos in 2-3 months for aneurysm       #Focal hematoma in the right properitoneal space  -per vascular: benefit of anticoagulation supersedes risk. Would recommend restarting anticoagulation. Patient's Hgb actively stable, however, please continue to monitor  -per surgery: abdominal binder, IR if Hg/Hct drops due to expansion of the hematoma, supportive care    # Severe Thoracic compression fracture T12 complicated by worsening severe back pain, limited ROM, difficulty with ambulation due to pain 2/2 severe compression fx  -MRI T-spine: multilevel compression deformities of the thoracic spine  -Neurosurgery was planning on intervention, however DVT was found  - Pain management eval appreciated: dilaudid PRN, gabapentin, toradol PRN for breakthrough pain  - TLSO brace and PT eval  - CM/social work  -per neurosurgery, can do PT/OT/Rehab, if can walk then can discharge and f/u o/p; if not, then will need to do kyphoplasty - looks like patient did ok with both PT and rehab; will start d/c planning   -spoke with [facility]x 1/29, plan is to hold off on kyphoplasty, she will need to see them outpatient in a month or so     #Obesity BMI 35  - wt loss per PCP    #Hepatic cyst  - f/u OP     #Hypothyroidism  - cont synthroid    #h/o HTN   - c/w amlodipine     #Hypokalemia  -resolved     #h/o DLD   - c/w statin     Diet - DASH   DVT ppx: Eliquis 10mg po bid     Dispo: d/c planning to SNF; pending auth/bed ([facility] vs [place]); keep d/c ready   Family: d/w patient at the bedside.

## 2023-02-02 NOTE — PROGRESS NOTE ADULT - SUBJECTIVE AND OBJECTIVE BOX
Patient is a 82y old  Female who presents with a chief complaint of Upper back pain       HPI:  82 year old female with past medical history of DLD, ILD on 2-3L home O2, compression fx thoracic spine, IVC filter, and hypothyroidism presents after recent discharge for shortness of breath and back pain. Patient lives at home alone, and is unable to care for herself. She had a visiting nurse service once since discharge who agreed the patient is unsafe to be living at home. Patient was found to be covid positive upon admission. She states that she has a slight sore throat and had one loose BM today, but does not feel any more short of breath than she she has been since last admission. Patient also states that over the last month, she has been having swelling of both lower legs. She denies chest pain/nausea/vomiting/ abdominal pain. Her back pain has been controlled on 20mg oxycodone.      #COVID19 s/p RDV   -Left midlung bandlike opacity. Lung volumes. No focal consolidation otherwise. No pneumothorax or pleural effusion.  -2-3L home O2, back on baseline requirement; try to wean as tolerated   -s/p RDV, she was given one dose of decadron at the time, was not continued  -< from: TTE Echo Complete w/o Contrast w/ Doppler (01.26.23 @ 12:48) >   1. Left ventricular ejection fraction, by visual estimation, is 55 to   60%.   2. Normal global left ventricular systolic function.   3. Spectral Doppler shows impaired relaxation pattern of left   ventricular myocardial filling (Grade I diastolic dysfunction).   4. Mildly enlarged left atrium.   5. Normal right atrial size.   6. Trace mitral valve regurgitation.   7. Mild tricuspid regurgitation.   8. Estimated pulmonary artery systolic pressure is 37.9 mmHg assuming a   right atrial pressure of 8 mmHg, which is consistent with borderline   pulmonary hypertension.    #Recurrent DVT  #New PE, no right heart strain   #Hx/o cerebral aneurysm s/p coiling - no new deficits   -per vascular, no need for thrombectomy    - has an IVC filter, couldn't be on AC in the past because of aneurysm, per neuroendovascular can restart a/c  -now on Eliquis 10mg po bid for 7 days (ends 2/3; needs to start 5mg in PM), then will do 5mg po bid for at least 3 months    -MRA brain no bleeding  -will need to f/u with Neuroendovascular Dr. Montesinos in 2-3 months for aneurysm       #Focal hematoma in the right properitoneal space  -per vascular: benefit of anticoagulation supersedes risk. Would recommend restarting anticoagulation. Patient's Hgb actively stable, however, please continue to monitor  -per surgery: abdominal binder, IR if Hg/Hct drops due to expansion of the hematoma, supportive care    # Severe Thoracic compression fracture T12 complicated by worsening severe back pain, limited ROM, difficulty with ambulation due to pain 2/2 severe compression fx  -MRI T-spine: multilevel compression deformities of the thoracic spine  -Neurosurgery was planning on intervention, however DVT was found- Pain management eval appreciated: dilaudid PRN, gabapentin, toradol PRN for breakthrough pain  - TLSO brace and PT eval  - CM/social work  -per neurosurgery, can do PT/OT/Rehab, if can walk then can discharge and f/u o/p; if not, then will need to do kyphoplasty - looks like patient did ok with both PT and rehab; will start d/c planning     medical charts / labs / imaging studies / PT notes reviewed     PHYSICAL EXAM    Vital Signs Last 24 Hrs  T(C): 36.2 (02 Feb 2023 08:42), Max: 36.4 (01 Feb 2023 21:17)  T(F): 97.2 (02 Feb 2023 08:42), Max: 97.6 (01 Feb 2023 21:17)  HR: 66 (02 Feb 2023 08:42) (66 - 87)  BP: 106/63 (02 Feb 2023 08:42) (106/63 - 132/71)  BP(mean): --  RR: 18 (02 Feb 2023 08:42) (18 - 18)  SpO2: 93% (02 Feb 2023 08:42) (93% - 100%)    Parameters below as of 02 Feb 2023 04:55  Patient On (Oxygen Delivery Method): nasal cannula        Constitutional - NAD  Chest - CTA  Cardiovascular - S1S2+  Abdomen -  Soft  Extremities -  No calf tenderness   Function : bed mobility sup / transfer CG                 ambulate 30'RW CG with TLSO brace     acetaminophen     Tablet .. 650 milliGRAM(s) Oral every 6 hours  amLODIPine   Tablet 2.5 milliGRAM(s) Oral daily  apixaban 10 milliGRAM(s) Oral every 12 hours  atorvastatin 20 milliGRAM(s) Oral at bedtime  budesonide 160 MICROgram(s)/formoterol 4.5 MICROgram(s) Inhaler 2 Puff(s) Inhalation two times a day  cyclobenzaprine 5 milliGRAM(s) Oral three times a day  furosemide    Tablet 20 milliGRAM(s) Oral daily  gabapentin 300 milliGRAM(s) Oral three times a day  HYDROmorphone   Tablet 2 milliGRAM(s) Oral every 4 hours PRN  levothyroxine 25 MICROGram(s) Oral daily  lidocaine   4% Patch 1 Patch Transdermal daily  lidocaine   4% Patch 1 Patch Transdermal daily  pantoprazole    Tablet 40 milliGRAM(s) Oral before breakfast  polyethylene glycol 3350 17 Gram(s) Oral daily  senna 2 Tablet(s) Oral at bedtime  tiotropium 2.5 MICROgram(s) Inhaler 2 Puff(s) Inhalation daily      RECENT LABS/IMAGING

## 2023-02-02 NOTE — PROGRESS NOTE ADULT - ASSESSMENT
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  Found in the hospital to have COVID.  Transferred here from [facility] for neurosurgery regarding compression fracture, found here to have extensive DVT.  Patient has an IVC filter, following up neuroendovascular surgery on whether patient can have AC.     #Acute hypoxemic respiratory failure  #COVID19 s/p RDV   -Left midlung bandlike opacity. Lung volumes. No focal consolidation otherwise. No pneumothorax or pleural effusion.  -2-3L home O2, back on baseline requirement; try to wean as tolerated   -s/p RDV, she was given one dose of decadron at the time, was not continued  -< from: TTE Echo Complete w/o Contrast w/ Doppler (01.26.23 @ 12:48) >   1. Left ventricular ejection fraction, by visual estimation, is 55 to   60%.   2. Normal global left ventricular systolic function.   3. Spectral Doppler shows impaired relaxation pattern of left   ventricular myocardial filling (Grade I diastolic dysfunction).   4. Mildly enlarged left atrium.   5. Normal right atrial size.   6. Trace mitral valve regurgitation.   7. Mild tricuspid regurgitation.   8. Estimated pulmonary artery systolic pressure is 37.9 mmHg assuming a   right atrial pressure of 8 mmHg, which is consistent with borderline   pulmonary hypertension.    #Recurrent DVT  #New PE, no right heart strain   #Hx/o cerebral aneurysm s/p coiling - no new deficits   -per vascular, no need for thrombectomy    - has an IVC filter, couldn't be on AC in the past because of aneurysm, per neuroendovascular can restart a/c  -now on Eliquis 10mg po bid for 7 days (ends 2/3; needs to start 5mg in PM), then will do 5mg po bid for at least 3 months    -MRA brain no bleeding  -will need to f/u with Neuroendovascular Dr. Montesinos in 2-3 months for aneurysm       #Focal hematoma in the right properitoneal space  -per vascular: benefit of anticoagulation supersedes risk. Would recommend restarting anticoagulation. Patient's Hgb actively stable, however, please continue to monitor  -per surgery: abdominal binder, IR if Hg/Hct drops due to expansion of the hematoma, supportive care    # Severe Thoracic compression fracture T12 complicated by worsening severe back pain, limited ROM, difficulty with ambulation due to pain 2/2 severe compression fx  -MRI T-spine: multilevel compression deformities of the thoracic spine  -Neurosurgery was planning on intervention, however DVT was found  - Pain management eval appreciated: dilaudid PRN, gabapentin, toradol PRN for breakthrough pain  - TLSO brace and PT eval  - CM/social work  -per neurosurgery, can do PT/OT/Rehab, if can walk then can discharge and f/u o/p; if not, then will need to do kyphoplasty - looks like patient did ok with both PT and rehab; will start d/c planning   -spoke with [facility] 1/29, plan is to hold off on kyphoplasty, she will need to see them outpatient in a month or so     #Obesity BMI 35  - wt loss per PCP    #Hepatic cyst  - f/u OP     #Hypothyroidism  - cont synthroid    #h/o HTN   - c/w amlodipine     #Hypokalemia  -resolved     #h/o DLD   - c/w statin     Diet - DASH   DVT ppx: Eliquis 10mg po bid     Dispo: d/c planning to SNF; pending auth/bed ([facility] vs [facility]); keep d/c ready   Family: d/w patient at the bedside.

## 2023-02-02 NOTE — PROGRESS NOTE ADULT - SUBJECTIVE AND OBJECTIVE BOX
WILFREDO CHO  82y, Female  Allergy: Compazine (Unknown)    Hospital Day: 17d    Patient seen and examined earlier today.  No acute events overnight.  reports back pain and swelling in RLE.     PMH/PSH:  PAST MEDICAL & SURGICAL HISTORY:  Hypothyroid      Hyperlipemia      Brain aneurysm      Compression fracture of spine      Presence of IVC filter      S/P appendectomy      S/P coil embolization of cerebral aneurysm          LAST 24-Hr EVENTS:    VITALS:  T(F): 97.2 (02-02-23 @ 08:42), Max: 97.6 (02-01-23 @ 21:17)  HR: 66 (02-02-23 @ 08:42)  BP: 106/63 (02-02-23 @ 08:42) (106/63 - 132/71)  RR: 18 (02-02-23 @ 08:42)  SpO2: 93% (02-02-23 @ 08:42)          TESTS & MEASUREMENTS:  Weight/BMI      01-31-23 @ 07:01  -  02-01-23 @ 07:00  --------------------------------------------------------  IN: 700 mL / OUT: 1100 mL / NET: -400 mL    02-01-23 @ 07:01  -  02-02-23 @ 07:00  --------------------------------------------------------  IN: 0 mL / OUT: 400 mL / NET: -400 mL    02-02-23 @ 07:01  -  02-02-23 @ 14:46  --------------------------------------------------------  IN: 0 mL / OUT: 450 mL / NET: -450 mL                                  Serum Pro-Brain Natriuretic Peptide: 224 pg/mL (01-24-23 @ 13:30)    COVID-19 PCR: Detected (01-29-23 @ 15:48)                RADIOLOGY, ECG, & ADDITIONAL TESTS:  12 Lead ECG:   Ventricular Rate 95 BPM    Atrial Rate 95 BPM    P-R Interval 152 ms    QRS Duration 76 ms    Q-T Interval 354 ms    QTC Calculation(Bazett) 444 ms    P Axis 36 degrees    R Axis -40 degrees    T Axis 25 degrees    Diagnosis Line Normal sinus rhythm  Left axis deviation    Possible Anterior infarct , age undetermined  Abnormal ECG    Confirmed by MACKENZIE HOPE MD ([id number]) on 1/27/2023 7:00:32 AM (01-27-23 @ 02:52)    CT Angio Chest PE Protocol w/ IV Cont:   ACC: [id number] EXAM:  CT ANGIO CHEST PULM ART [facility]   ORDERED BY: IRIS SCHUSTER     PROCEDURE DATE:  01/24/2023          INTERPRETATION:  Reason for study:  Possible pulmonary embolism.         Technique: Approximately 80 cc of Optiray 320 was administered   intravenously, followed by approximate 50 cc saline flush.    Multiple transaxial images of the thorax was obtained, utilizing   pulmonary embolism protocol (in order to facilitate opacification of the   pulmonary arterial tree).  20 cc of contrast material was discarded.     MIP, Coronal and sagittal reformatted images were performed to better   evaluate anatomic relationships and for possible preoperative planning.        Comparison: CT thorax 12/26/2022.      Findings: Motion artifact limits parenchymal assessment.    Pulmonary Arteries:Interval development, occlusive emboli within the   proximal right lower lobe pulmonary artery is noted (4/).    Main pulmonary artery segment measures 3.1 cm. RV/LV ratio less than 1.   (Noheart strain)    Tubes/Lines: none  Mediastinum/hilum: No adenopathy or mass.  .     Heart/Great Vessels:No pericardial effusions. Ascending aorta normal in   caliber..    Abdomen: Hepatic steatosis. Stable approximate 7 cm cyst, right hepatic   dome.    Bones and soft tissues: Severe degenerative changes, thoracic spine    Lungs, Pleura, and Airways:   Patent central tracheobronchial tree. No effusions or parenchymal masses   are noted. Stable reticular opacities most pronounced within left upper   lobe are noted.      Pulmonary nodules:  No suspicious pulmonary nodules.    IMPRESSION:    Since  12/26/2022      Interval development, occlusive emboli within the proximal right lower   lobe pulmonary artery (4/). No right heart strain.    Spoke with Dr. SUMNER,  on 1/24/2023 4:30 PM with readback.    --- End of Report ---            LATESHA HILL MD; Attending Radiologist  This document has been electronically signed. Jan 24 2023  4:31PM (01-24-23 @ 16:02)    RECENT DIAGNOSTIC ORDERS:      MEDICATIONS:  MEDICATIONS  (STANDING):  acetaminophen     Tablet .. 650 milliGRAM(s) Oral every 6 hours  amLODIPine   Tablet 2.5 milliGRAM(s) Oral daily  apixaban 10 milliGRAM(s) Oral every 12 hours  atorvastatin 20 milliGRAM(s) Oral at bedtime  budesonide 160 MICROgram(s)/formoterol 4.5 MICROgram(s) Inhaler 2 Puff(s) Inhalation two times a day  cyclobenzaprine 5 milliGRAM(s) Oral three times a day  furosemide    Tablet 20 milliGRAM(s) Oral daily  gabapentin 300 milliGRAM(s) Oral three times a day  levothyroxine 25 MICROGram(s) Oral daily  lidocaine   4% Patch 1 Patch Transdermal daily  lidocaine   4% Patch 1 Patch Transdermal daily  pantoprazole    Tablet 40 milliGRAM(s) Oral before breakfast  polyethylene glycol 3350 17 Gram(s) Oral daily  senna 2 Tablet(s) Oral at bedtime  tiotropium 2.5 MICROgram(s) Inhaler 2 Puff(s) Inhalation daily    MEDICATIONS  (PRN):  HYDROmorphone   Tablet 2 milliGRAM(s) Oral every 4 hours PRN Severe Pain (7 - 10)      HOME MEDICATIONS:  acetaminophen 325 mg oral tablet (01-16)  ALBUTEROL HFA 90 MCG INHALER (01-16)  AMLODIPINE BESYLATE 2.5 MG TAB (01-30)  apixaban 5 mg oral tablet (01-30)  atorvastatin 20 mg oral tablet (01-30)  furosemide 20 mg oral tablet (01-30)  gabapentin 300 mg oral capsule (01-30)  HYDROmorphone 2 mg oral tablet (01-30)  LEVOTHYROXINE 25 MCG TABLET (01-16)  lidocaine 4% topical film (01-16)  pantoprazole 40 mg oral delayed release tablet (01-30)  polyethylene glycol 3350 oral powder for reconstitution (01-16)  senna leaf extract oral tablet (01-16)  Symbicort 160 mcg-4.5 mcg/inh inhalation aerosol (01-16)  tiotropium 2.5 mcg/inh inhalation aerosol (01-16)      PHYSICAL EXAM:  GENERAL: NAD, obese, lying in bed comfortably, on 2L NC  HEAD:  Atraumatic, Normocephalic  EYES: EOMI, PERRLA, conjunctiva and sclera clear  ENT: Moist mucous membranes  NECK: Supple, No JVD     CHEST/LUNG: CTABL; mildly tender to palpation on front of right side of chest on 10th rib  HEART: Regular rate and rhythm; No murmurs, rubs, or gallops  ABDOMEN: Bowel sounds present; Soft, Nontender, Nondistended. No hepatomegaly  EXTREMITIES:  2+ Peripheral Pulses, brisk capillary refill. Trace b/l LE edema.   NERVOUS SYSTEM:  Alert & Oriented X3, speech clear. No deficits   MSK: FROM all 4 extremities, full and equal strength   SKIN: No rashes or lesions

## 2023-02-03 ENCOUNTER — TRANSCRIPTION ENCOUNTER (OUTPATIENT)
Age: 83
End: 2023-02-03

## 2023-02-03 LAB — SARS-COV-2 RNA SPEC QL NAA+PROBE: DETECTED

## 2023-02-03 PROCEDURE — 99239 HOSP IP/OBS DSCHRG MGMT >30: CPT

## 2023-02-03 RX ADMIN — AMLODIPINE BESYLATE 2.5 MILLIGRAM(S): 2.5 TABLET ORAL at 06:08

## 2023-02-03 RX ADMIN — Medication 650 MILLIGRAM(S): at 06:05

## 2023-02-03 RX ADMIN — HYDROMORPHONE HYDROCHLORIDE 2 MILLIGRAM(S): 2 INJECTION INTRAMUSCULAR; INTRAVENOUS; SUBCUTANEOUS at 11:54

## 2023-02-03 RX ADMIN — LIDOCAINE 1 PATCH: 4 CREAM TOPICAL at 11:25

## 2023-02-03 RX ADMIN — CYCLOBENZAPRINE HYDROCHLORIDE 5 MILLIGRAM(S): 10 TABLET, FILM COATED ORAL at 06:06

## 2023-02-03 RX ADMIN — TIOTROPIUM BROMIDE 2 PUFF(S): 18 CAPSULE ORAL; RESPIRATORY (INHALATION) at 08:03

## 2023-02-03 RX ADMIN — APIXABAN 10 MILLIGRAM(S): 2.5 TABLET, FILM COATED ORAL at 06:07

## 2023-02-03 RX ADMIN — HYDROMORPHONE HYDROCHLORIDE 2 MILLIGRAM(S): 2 INJECTION INTRAMUSCULAR; INTRAVENOUS; SUBCUTANEOUS at 22:50

## 2023-02-03 RX ADMIN — GABAPENTIN 300 MILLIGRAM(S): 400 CAPSULE ORAL at 22:29

## 2023-02-03 RX ADMIN — CYCLOBENZAPRINE HYDROCHLORIDE 5 MILLIGRAM(S): 10 TABLET, FILM COATED ORAL at 13:08

## 2023-02-03 RX ADMIN — HYDROMORPHONE HYDROCHLORIDE 2 MILLIGRAM(S): 2 INJECTION INTRAMUSCULAR; INTRAVENOUS; SUBCUTANEOUS at 11:24

## 2023-02-03 RX ADMIN — ATORVASTATIN CALCIUM 20 MILLIGRAM(S): 80 TABLET, FILM COATED ORAL at 22:29

## 2023-02-03 RX ADMIN — Medication 650 MILLIGRAM(S): at 11:25

## 2023-02-03 RX ADMIN — GABAPENTIN 300 MILLIGRAM(S): 400 CAPSULE ORAL at 06:06

## 2023-02-03 RX ADMIN — Medication 650 MILLIGRAM(S): at 11:58

## 2023-02-03 RX ADMIN — PANTOPRAZOLE SODIUM 40 MILLIGRAM(S): 20 TABLET, DELAYED RELEASE ORAL at 06:06

## 2023-02-03 RX ADMIN — Medication 25 MICROGRAM(S): at 06:07

## 2023-02-03 RX ADMIN — Medication 20 MILLIGRAM(S): at 06:06

## 2023-02-03 RX ADMIN — GABAPENTIN 300 MILLIGRAM(S): 400 CAPSULE ORAL at 13:08

## 2023-02-03 RX ADMIN — SENNA PLUS 2 TABLET(S): 8.6 TABLET ORAL at 22:29

## 2023-02-03 RX ADMIN — LIDOCAINE 1 PATCH: 4 CREAM TOPICAL at 11:26

## 2023-02-03 RX ADMIN — HYDROMORPHONE HYDROCHLORIDE 2 MILLIGRAM(S): 2 INJECTION INTRAMUSCULAR; INTRAVENOUS; SUBCUTANEOUS at 22:20

## 2023-02-03 RX ADMIN — CYCLOBENZAPRINE HYDROCHLORIDE 5 MILLIGRAM(S): 10 TABLET, FILM COATED ORAL at 22:30

## 2023-02-03 NOTE — CHART NOTE - NSCHARTNOTEFT_GEN_A_CORE
Pt was seen and examined at the bedside.  resting comfortably. plan for dc to rehab.     Vital Signs Last 24 Hrs  T(C): 36.5 (03 Feb 2023 08:32), Max: 36.8 (02 Feb 2023 16:34)  T(F): 97.7 (03 Feb 2023 08:32), Max: 98.2 (02 Feb 2023 16:34)  HR: 71 (03 Feb 2023 08:32) (71 - 84)  BP: 111/52 (03 Feb 2023 08:32) (104/63 - 119/60)  BP(mean): --  RR: 18 (03 Feb 2023 08:32) (18 - 18)  SpO2: 97% (03 Feb 2023 08:32) (91% - 97%)

## 2023-02-03 NOTE — CHART NOTE - NSCHARTNOTESELECT_GEN_ALL_CORE
Event Note
Neuroendovascular
Vascular
Event Note
Event Note
Neuroendovascular
Neurosurgery/Event Note
RD Limited/Nutrition Services

## 2023-02-03 NOTE — DISCHARGE NOTE NURSING/CASE MANAGEMENT/SOCIAL WORK - PATIENT PORTAL LINK FT
You can access the FollowMyHealth Patient Portal offered by [facility] by registering at the following website: [email or web address]. By joining [employer]’s FollowMyHealth portal, you will also be able to view your health information using other applications (apps) compatible with our system.

## 2023-02-03 NOTE — DISCHARGE NOTE NURSING/CASE MANAGEMENT/SOCIAL WORK - NSDCPEFALRISK_GEN_ALL_CORE
For information on Fall & Injury Prevention, visit: [email or web address] OR  [email or web address] OR  https://www.cdc.gov/steadi/patient.html

## 2023-02-03 NOTE — DISCHARGE NOTE NURSING/CASE MANAGEMENT/SOCIAL WORK - NSDCFUADDAPPT_GEN_ALL_CORE_FT
Follow up with liver doctor (Dr. Campbell) for a cyst in your liver.  Follow up with your PCP for weight loss.  Follow up with Dr. Montesinos for care of your brain aneurysm.  Follow up with Dr. Hwang in a month for your compression fracture in your spine.

## 2023-02-04 VITALS
DIASTOLIC BLOOD PRESSURE: 71 MMHG | HEART RATE: 82 BPM | SYSTOLIC BLOOD PRESSURE: 102 MMHG | OXYGEN SATURATION: 96 % | TEMPERATURE: 96 F | RESPIRATION RATE: 18 BRPM

## 2023-02-04 PROCEDURE — 99231 SBSQ HOSP IP/OBS SF/LOW 25: CPT

## 2023-02-04 RX ADMIN — HYDROMORPHONE HYDROCHLORIDE 2 MILLIGRAM(S): 2 INJECTION INTRAMUSCULAR; INTRAVENOUS; SUBCUTANEOUS at 11:00

## 2023-02-04 RX ADMIN — TIOTROPIUM BROMIDE 2 PUFF(S): 18 CAPSULE ORAL; RESPIRATORY (INHALATION) at 09:40

## 2023-02-04 RX ADMIN — Medication 25 MICROGRAM(S): at 05:27

## 2023-02-04 RX ADMIN — HYDROMORPHONE HYDROCHLORIDE 2 MILLIGRAM(S): 2 INJECTION INTRAMUSCULAR; INTRAVENOUS; SUBCUTANEOUS at 05:25

## 2023-02-04 RX ADMIN — HYDROMORPHONE HYDROCHLORIDE 2 MILLIGRAM(S): 2 INJECTION INTRAMUSCULAR; INTRAVENOUS; SUBCUTANEOUS at 11:30

## 2023-02-04 RX ADMIN — HYDROMORPHONE HYDROCHLORIDE 2 MILLIGRAM(S): 2 INJECTION INTRAMUSCULAR; INTRAVENOUS; SUBCUTANEOUS at 04:45

## 2023-02-04 RX ADMIN — LIDOCAINE 1 PATCH: 4 CREAM TOPICAL at 11:04

## 2023-02-04 RX ADMIN — Medication 20 MILLIGRAM(S): at 05:27

## 2023-02-04 RX ADMIN — GABAPENTIN 300 MILLIGRAM(S): 400 CAPSULE ORAL at 05:27

## 2023-02-04 RX ADMIN — CYCLOBENZAPRINE HYDROCHLORIDE 5 MILLIGRAM(S): 10 TABLET, FILM COATED ORAL at 05:28

## 2023-02-04 RX ADMIN — LIDOCAINE 1 PATCH: 4 CREAM TOPICAL at 11:01

## 2023-02-04 RX ADMIN — Medication 650 MILLIGRAM(S): at 11:01

## 2023-02-04 RX ADMIN — AMLODIPINE BESYLATE 2.5 MILLIGRAM(S): 2.5 TABLET ORAL at 05:27

## 2023-02-04 RX ADMIN — PANTOPRAZOLE SODIUM 40 MILLIGRAM(S): 20 TABLET, DELAYED RELEASE ORAL at 05:27

## 2023-02-04 NOTE — PROGRESS NOTE ADULT - PROVIDER SPECIALTY LIST ADULT
Hospitalist
Internal Medicine
Hospitalist
Internal Medicine
Physiatry
Hospitalist
Hospitalist
Internal Medicine
Internal Medicine
Pulmonology
Pulmonology
Hospitalist
Hospitalist
Pain Medicine

## 2023-02-04 NOTE — PROGRESS NOTE ADULT - SUBJECTIVE AND OBJECTIVE BOX
WILFREDO CHO  82y, Female  Allergy: Compazine (Unknown)    Hospital Day: 19d    Patient seen and examined earlier today.  No acute events overnight. reports back pain.     PMH/PSH:  PAST MEDICAL & SURGICAL HISTORY:  Hypothyroid      Hyperlipemia      Brain aneurysm      Compression fracture of spine      Presence of IVC filter      S/P appendectomy      S/P coil embolization of cerebral aneurysm          LAST 24-Hr EVENTS:    VITALS:  T(F): 96.3 (02-04-23 @ 07:59), Max: 97.6 (02-04-23 @ 05:42)  HR: 82 (02-04-23 @ 07:59)  BP: 102/71 (02-04-23 @ 07:59) (101/60 - 122/58)  RR: 18 (02-04-23 @ 07:59)  SpO2: 96% (02-04-23 @ 07:59)          TESTS & MEASUREMENTS:  Weight/BMI      02-02-23 @ 07:01  -  02-03-23 @ 07:00  --------------------------------------------------------  IN: 0 mL / OUT: 1050 mL / NET: -1050 mL    02-03-23 @ 07:01  -  02-04-23 @ 07:00  --------------------------------------------------------  IN: 0 mL / OUT: 700 mL / NET: -700 mL                                    COVID-19 PCR: Detected (02-03-23 @ 14:13)  COVID-19 PCR: Detected (01-29-23 @ 15:48)                RADIOLOGY, ECG, & ADDITIONAL TESTS:  12 Lead ECG:   Ventricular Rate 95 BPM    Atrial Rate 95 BPM    P-R Interval 152 ms    QRS Duration 76 ms    Q-T Interval 354 ms    QTC Calculation(Bazett) 444 ms    P Axis 36 degrees    R Axis -40 degrees    T Axis 25 degrees    Diagnosis Line Normal sinus rhythm  Left axis deviation    Possible Anterior infarct , age undetermined  Abnormal ECG    Confirmed by MACEKNZIE HOPE MD ([id number]) on 1/27/2023 7:00:32 AM (01-27-23 @ 02:52)      RECENT DIAGNOSTIC ORDERS:      MEDICATIONS:  MEDICATIONS  (STANDING):  acetaminophen     Tablet .. 650 milliGRAM(s) Oral every 6 hours  amLODIPine   Tablet 2.5 milliGRAM(s) Oral daily  atorvastatin 20 milliGRAM(s) Oral at bedtime  budesonide 160 MICROgram(s)/formoterol 4.5 MICROgram(s) Inhaler 2 Puff(s) Inhalation two times a day  cyclobenzaprine 5 milliGRAM(s) Oral three times a day  furosemide    Tablet 20 milliGRAM(s) Oral daily  gabapentin 300 milliGRAM(s) Oral three times a day  levothyroxine 25 MICROGram(s) Oral daily  lidocaine   4% Patch 1 Patch Transdermal daily  lidocaine   4% Patch 1 Patch Transdermal daily  pantoprazole    Tablet 40 milliGRAM(s) Oral before breakfast  polyethylene glycol 3350 17 Gram(s) Oral daily  senna 2 Tablet(s) Oral at bedtime  tiotropium 2.5 MICROgram(s) Inhaler 2 Puff(s) Inhalation daily    MEDICATIONS  (PRN):  HYDROmorphone   Tablet 2 milliGRAM(s) Oral every 4 hours PRN Severe Pain (7 - 10)      HOME MEDICATIONS:  acetaminophen 325 mg oral tablet (01-16)  ALBUTEROL HFA 90 MCG INHALER (01-16)  AMLODIPINE BESYLATE 2.5 MG TAB (01-30)  apixaban 5 mg oral tablet (01-30)  atorvastatin 20 mg oral tablet (01-30)  furosemide 20 mg oral tablet (01-30)  gabapentin 300 mg oral capsule (01-30)  HYDROmorphone 2 mg oral tablet (01-30)  LEVOTHYROXINE 25 MCG TABLET (01-16)  lidocaine 4% topical film (01-16)  pantoprazole 40 mg oral delayed release tablet (01-30)  polyethylene glycol 3350 oral powder for reconstitution (01-16)  senna leaf extract oral tablet (01-16)  Symbicort 160 mcg-4.5 mcg/inh inhalation aerosol (01-16)  tiotropium 2.5 mcg/inh inhalation aerosol (01-16)      PHYSICAL EXAM:  GENERAL: NAD, lying in bed comfortably  HEAD:  Atraumatic, Normocephalic  EYES: EOMI, conjunctiva and sclera clear  ENT: Moist mucous membranes  CHEST/LUNG: No dyspnea, non-labored breathing. No use of accessory muscles.   HEART: Regular   ABDOMEN: Nondistended  EXTREMITIES: No clubbing, cyanosis, or edema  NERVOUS SYSTEM:  Alert & Oriented X3, speech clear. No deficits   MSK: FROM all 4 extremities

## 2023-02-04 NOTE — PROGRESS NOTE ADULT - REASON FOR ADMISSION
Patient is a 82y old  Female who presents with a chief complaint of T12 comp fx (02 Feb 2023 16:16)
Patient is a 82y old  Female who presents with a chief complaint of Upper back pain (30 Jan 2023 21:20)
back pain
back pain
T12 comp fx
Patient is a 82y old  Female who presents with a chief complaint of Patient is a 82y old  Female who presents with a chief complaint of Upper back pain (30 Jan 2023 21:20) (01 Feb 2023 16:00)
(0) Normal

## 2023-02-04 NOTE — PROGRESS NOTE ADULT - ASSESSMENT
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  Found in the hospital to have COVID.  Transferred here from [facility] for neurosurgery regarding compression fracture, found here to have extensive DVT.  Patient has an IVC filter, following up neuroendovascular surgery on whether patient can have AC.     #Acute hypoxemic respiratory failure  #COVID19 s/p RDV   -Left midlung bandlike opacity. Lung volumes. No focal consolidation otherwise. No pneumothorax or pleural effusion.  -2-3L home O2, back on baseline requirement; try to wean as tolerated   -s/p RDV, she was given one dose of decadron at the time, was not continued  -< from: TTE Echo Complete w/o Contrast w/ Doppler (01.26.23 @ 12:48) >   1. Left ventricular ejection fraction, by visual estimation, is 55 to   60%.   2. Normal global left ventricular systolic function.   3. Spectral Doppler shows impaired relaxation pattern of left   ventricular myocardial filling (Grade I diastolic dysfunction).   4. Mildly enlarged left atrium.   5. Normal right atrial size.   6. Trace mitral valve regurgitation.   7. Mild tricuspid regurgitation.   8. Estimated pulmonary artery systolic pressure is 37.9 mmHg assuming a   right atrial pressure of 8 mmHg, which is consistent with borderline   pulmonary hypertension.    #Recurrent DVT  #New PE, no right heart strain   #Hx/o cerebral aneurysm s/p coiling - no new deficits   -per vascular, no need for thrombectomy    - has an IVC filter, couldn't be on AC in the past because of aneurysm, per neuroendovascular can restart a/c  -now on Eliquis 10mg po bid for 7 days (ends 2/3; needs to start 5mg in PM), then will do 5mg po bid for at least 3 months    -MRA brain no bleeding  -will need to f/u with Neuroendovascular Dr. Montesinos in 2-3 months for aneurysm       #Focal hematoma in the right properitoneal space  -per vascular: benefit of anticoagulation supersedes risk. Would recommend restarting anticoagulation. Patient's Hgb actively stable, however, please continue to monitor  -per surgery: abdominal binder, IR if Hg/Hct drops due to expansion of the hematoma, supportive care    # Severe Thoracic compression fracture T12 complicated by worsening severe back pain, limited ROM, difficulty with ambulation due to pain 2/2 severe compression fx  -MRI T-spine: multilevel compression deformities of the thoracic spine  -Neurosurgery was planning on intervention, however DVT was found  - Pain management eval appreciated: dilaudid PRN, gabapentin, toradol PRN for breakthrough pain  - TLSO brace and PT eval  - CM/social work  -per neurosurgery, can do PT/OT/Rehab, if can walk then can discharge and f/u o/p; if not, then will need to do kyphoplasty - looks like patient did ok with both PT and rehab; will start d/c planning   -spoke with [facility] 1/29, plan is to hold off on kyphoplasty, she will need to see them outpatient in a month or so     #Obesity BMI 35  - wt loss per PCP    #Hepatic cyst  - f/u OP     #Hypothyroidism  - cont synthroid    #h/o HTN   - c/w amlodipine     #Hypokalemia  -resolved     #h/o DLD   - c/w statin     Diet - DASH   DVT ppx: Eliquis 10mg po bid     Dispo: d/c planning to SNF; pending auth/bed ([facility] vs [place]); keep d/c ready   Family: d/w patient at the bedside.

## 2023-02-14 DIAGNOSIS — U07.1 COVID-19: ICD-10-CM

## 2023-02-14 DIAGNOSIS — M48.04 SPINAL STENOSIS, THORACIC REGION: ICD-10-CM

## 2023-02-14 DIAGNOSIS — Z79.890 HORMONE REPLACEMENT THERAPY: ICD-10-CM

## 2023-02-14 DIAGNOSIS — Z99.81 DEPENDENCE ON SUPPLEMENTAL OXYGEN: ICD-10-CM

## 2023-02-14 DIAGNOSIS — M48.05 SPINAL STENOSIS, THORACOLUMBAR REGION: ICD-10-CM

## 2023-02-14 DIAGNOSIS — R06.02 SHORTNESS OF BREATH: ICD-10-CM

## 2023-02-14 DIAGNOSIS — E87.6 HYPOKALEMIA: ICD-10-CM

## 2023-02-14 DIAGNOSIS — J96.21 ACUTE AND CHRONIC RESPIRATORY FAILURE WITH HYPOXIA: ICD-10-CM

## 2023-02-14 DIAGNOSIS — E66.9 OBESITY, UNSPECIFIED: ICD-10-CM

## 2023-02-14 DIAGNOSIS — E78.5 HYPERLIPIDEMIA, UNSPECIFIED: ICD-10-CM

## 2023-02-14 DIAGNOSIS — Z86.718 PERSONAL HISTORY OF OTHER VENOUS THROMBOSIS AND EMBOLISM: ICD-10-CM

## 2023-02-14 DIAGNOSIS — M48.54XA COLLAPSED VERTEBRA, NOT ELSEWHERE CLASSIFIED, THORACIC REGION, INITIAL ENCOUNTER FOR FRACTURE: ICD-10-CM

## 2023-02-14 DIAGNOSIS — K66.1 HEMOPERITONEUM: ICD-10-CM

## 2023-02-14 DIAGNOSIS — E03.9 HYPOTHYROIDISM, UNSPECIFIED: ICD-10-CM

## 2023-02-14 DIAGNOSIS — I10 ESSENTIAL (PRIMARY) HYPERTENSION: ICD-10-CM

## 2023-02-14 DIAGNOSIS — Z87.891 PERSONAL HISTORY OF NICOTINE DEPENDENCE: ICD-10-CM

## 2023-02-14 DIAGNOSIS — K76.89 OTHER SPECIFIED DISEASES OF LIVER: ICD-10-CM

## 2023-02-14 DIAGNOSIS — M47.814 SPONDYLOSIS WITHOUT MYELOPATHY OR RADICULOPATHY, THORACIC REGION: ICD-10-CM

## 2023-02-14 DIAGNOSIS — I26.99 OTHER PULMONARY EMBOLISM WITHOUT ACUTE COR PULMONALE: ICD-10-CM

## 2023-02-14 DIAGNOSIS — J84.9 INTERSTITIAL PULMONARY DISEASE, UNSPECIFIED: ICD-10-CM

## 2023-03-17 ENCOUNTER — APPOINTMENT (OUTPATIENT)
Dept: NEUROSURGERY | Facility: CLINIC | Age: 83
End: 2023-03-17

## 2023-03-24 ENCOUNTER — APPOINTMENT (OUTPATIENT)
Dept: NEUROSURGERY | Facility: CLINIC | Age: 83
End: 2023-03-24
Payer: COMMERCIAL

## 2023-03-24 VITALS — WEIGHT: 173 LBS | HEIGHT: 60 IN | BODY MASS INDEX: 33.96 KG/M2

## 2023-03-24 DIAGNOSIS — Z86.39 PERSONAL HISTORY OF OTHER ENDOCRINE, NUTRITIONAL AND METABOLIC DISEASE: ICD-10-CM

## 2023-03-24 DIAGNOSIS — Z80.9 FAMILY HISTORY OF MALIGNANT NEOPLASM, UNSPECIFIED: ICD-10-CM

## 2023-03-24 DIAGNOSIS — Z86.718 PERSONAL HISTORY OF OTHER VENOUS THROMBOSIS AND EMBOLISM: ICD-10-CM

## 2023-03-24 DIAGNOSIS — Z86.79 PERSONAL HISTORY OF OTHER DISEASES OF THE CIRCULATORY SYSTEM: ICD-10-CM

## 2023-03-24 DIAGNOSIS — S22.000A WEDGE COMPRESSION FRACTURE OF UNSPECIFIED THORACIC VERTEBRA, INITIAL ENCOUNTER FOR CLOSED FRACTURE: ICD-10-CM

## 2023-03-24 DIAGNOSIS — Z82.49 FAMILY HISTORY OF ISCHEMIC HEART DISEASE AND OTHER DISEASES OF THE CIRCULATORY SYSTEM: ICD-10-CM

## 2023-03-24 DIAGNOSIS — Z82.61 FAMILY HISTORY OF ARTHRITIS: ICD-10-CM

## 2023-03-24 DIAGNOSIS — Z87.39 PERSONAL HISTORY OF OTHER DISEASES OF THE MUSCULOSKELETAL SYSTEM AND CONNECTIVE TISSUE: ICD-10-CM

## 2023-03-24 DIAGNOSIS — Z87.11 PERSONAL HISTORY OF PEPTIC ULCER DISEASE: ICD-10-CM

## 2023-03-24 DIAGNOSIS — Z87.01 PERSONAL HISTORY OF PNEUMONIA (RECURRENT): ICD-10-CM

## 2023-03-24 PROCEDURE — 99215 OFFICE O/P EST HI 40 MIN: CPT

## 2023-03-24 NOTE — HISTORY OF PRESENT ILLNESS
[FreeTextEntry1] : Mrs. Chase is a very pleasant 82-year-old woman being seen as part of hospital follow-up with extensive PMHx of COPD, Osteoporosis , PE/DVT on eliquis, cerebral aneurysm (s/p coiled 2010), HTN, HLD, hypothyroidism presents today after being hospitalized in 12/2022 for SOB w/ exertion and back pain found on CT/MRI to have mutli-level compression fractures.\par \par She presents today with her daughter and reports her back pain has improved, it is tolerable with pain medication. She is able to ambulate with a walker and perform her ADL. She was at [facility]/rehab where she received PT, and discharged on 3/2. Continues to receive home PT which is helping with her pain and mobility. \par Denies radicular arm/leg pain, numbness, tingling, and bladder/bowel incontinence. \par \par \max Has yet to follow up with Pain management for continuation of medical management. Ms. Chase did express during this visit, that in the past she was taking dilaudid which she had a dependency, and eventually stopped "cold turkey" ( during this time she also stopped the biphosphates), but she is willing to try injection.\par \par \par MRI of the thoracic spine w/o contrast done on 12/2022 at [facility] showed \par -Mild compression deformity of T4, likely chronic\par -Moderate compression deformity of T5 with trace retropulsion of the vertebral cortex, likely subacute.\par -Mild compression deformity of T9, likely subacute.\par -Mild-moderate compression deformity of T11, likely chronic.\par -Severe compression deformity of T12 with mild retropulsion of the posterior cortex, likely acute on chronic.

## 2023-03-24 NOTE — END OF VISIT
[FreeTextEntry3] : I have independently evaluated and examined this patient, as well as independently reviewed her imaging and agree with the assessment & plan above.  Ms. Chase is significantly improved to when I last examined her as an inpatient in the hospital.  She is much more ambulatory able to go from a seated to standing position without any difficulties and really very much so more comfortable than she was.  She has made great improvement with her conservative management to date and is continuing to improve.  She has great improvement with physical therapy and should continue to work with them.  I do feel that pain management evaluation can be helpful to help guide her with multimodal medical management especially given her history as she would like to avoid narcotics if possible.  That being said her improvement is so great that I also think she does not require any injections at this time and could use this intervention as a bailout should she have can denude worsening pain in the future.  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  She will follow-up primarily with pain management as well as her primary care physician to assist with treatment and management of her osteoporosis.  She will follow-up with me on an as-needed basis she develop worsening back pain or new signs or symptoms of neurologic dysfunction.  All her and her daughter's questions were answered and they were in full agreement with the plan above.  Thank you for allow me to participate in your care.\par \par Sincerely,\par \par Lion Hwang MD\par Department of Neurosurgery\par [facility] / [facility]\par \par  [Time Spent: ___ minutes] : I have spent [unfilled] minutes of time on the encounter.

## 2023-03-31 ENCOUNTER — APPOINTMENT (OUTPATIENT)
Dept: PAIN MANAGEMENT | Facility: CLINIC | Age: 83
End: 2023-03-31
Payer: COMMERCIAL

## 2023-03-31 VITALS — BODY MASS INDEX: 35.53 KG/M2 | HEIGHT: 60 IN | WEIGHT: 181 LBS

## 2023-03-31 PROCEDURE — 99214 OFFICE O/P EST MOD 30 MIN: CPT

## 2023-03-31 NOTE — ASSESSMENT
[FreeTextEntry1] : A discussion regarding available pain management treatment options occurred with the patient.  These included interventional, rehabilitative, pharmacological, and alternative modalities. We will proceed with the following:\par \par 1. Obtain MRI of Lumbar spine\par MRI lumbar spine w/o contrast to evaluate for anatomic changes of the lumbar discs, nerves, and surrounding tissue that will help provide information to accurately diagnose the patient's cause of pain and therefore treat said pain generator in the most effective way possible - whether that be specific physical therapy recommendations, medications, and/or interventional therapies.\par \par 2. Refill Oxycodone-Acetaminophen \par The patient reports good pain control with their current medication regimen.  They deny any intolerable side effects.  There is no obvious aberrant behavior.  The patient is more functional with regard to her ADLs and social activity as result of their current medication regimen. \par \par 3. Start Calcitonin Nasal Spray\par one nostril one spray daily and alternate nostrils\par treatment for 2 months then stop\par \par f/u in 2-3 weeks via telephone visit to review imaging. \par \par The patient has reviewed and signed an opioid agreement delineating the use of opioid pain medications within our practice. All questions answered to patient's satisfaction.\par \par CARMENCITA Saldaña attest that this documentation has been prepared under the direction and in the presence of provider Dr. Petr Arellano. \par The documentation recorded by the scribe in my presence, accurately reflects the service I personally performed, and the decisions made by me with my edits as appropriate. \par \par Petr Arellano, \par

## 2023-03-31 NOTE — HISTORY OF PRESENT ILLNESS
[FreeTextEntry1] : HISTORY OF PRESENT ILLNESS: This is a 82 year old woman complaining of back pain. The patient has had this pain for months. The pain started after illness. Patient describes pain as moderate to severe. During the last month the pain has been constant and in the morning. Pain described as throbbing. Pain is associated with numbness and pins and needles. Patient has weakness in the bilateral upper and lower extremties. Pain is increased with standing, sitting, relaxation, cough/sneezing and bowel movements.  Pain is decreased with lying down, walking and relaxation.  Bowel or bladder habits have not changed. \par   \par Aggie also complaints of right sided low back pain that refers to right buttock, right posterior thigh, right lateral lower leg and into the right lateral foot. She reports sharp stabbing pain that burns down the leg. Pain is consistent and all of the time, but made worse with standing and activity with some relief when sitting. The pain has been ongoing for over two months without resolution. She experienced similar symptoms in the past, but the pain would resolve on its own. She does use oxycodone 20mg daily PRN for the pain relief, but is uninterested in maintaining that as a pain management modality. She is interested in other options. \par \par ACTIVITIES: Patient could walk less than a block before the pain starts. Patient can sit 4 hours before pain starts. Patient can stand 15 minutes before pain starts. The patient often lays down because of pain. Patient uses a walker at this time. Patient has difficulty performing household chores, doing yardwork or shopping, socializing with friends, participating in recreational activities and exercising at this time. \par  \par PRIOR PAIN TREATMENTS: Patient had moderate relief with heat treatment.\par  \par PRIOR PAIN MEDICATIONS: Fentanyl, Dilaudid, OxyContin, Percocet, Tramadol, Tylenol, Naproxen, Flexeril\par

## 2023-03-31 NOTE — PHYSICAL EXAM
[de-identified] : Lumbar Spine Exam:\par Inspection:\par erythema (-)\par ecchymosis (-)\par rashes (-)\par alignment: no scoliosis\par \par Palpation:\par Midline lumbar tenderness:             (-)\par paraspinal tenderness:                  L (-) ; R (+)\par sciatic nerve tenderness :             L (-) ; R (+)\par SI joint tenderness:                        L (-) ; R (+)\par GTB tenderness:                            L (-);  R (-)\par \par Limited ROM 2/2 to pain in lumbar extension\par \par Strength:\par 5/5 throughout all muscle groups of the lower extremity\par \par                                    Right       Left   \par Hip Flexion:                (5/5)       (4+/5)\par Quadriceps:               (5/5)       (4+5)\par Hamstrings:                (5/5)       (4+/5)\par Ankle Dorsiflexion:     (5/5)       (4+/5)\par EHL:                           (5/5)       (4+/5)\par Ankle Plantarflexion:  (5/5)       (4+/5)\par \par Special Tests:\par SLR:                           R (+) ; L (-)\par Facet loading:            R (-) ; L (-)\par HUBER test:               R (-) ; L (-)\par \par Neurologic:\par Light touch intact throughout LE \par Reflexes normal and symmetric \par \par Gait:\par non- antalgic gait\par ambulates w/o assistive device\par

## 2023-04-07 ENCOUNTER — FORM ENCOUNTER (OUTPATIENT)
Age: 83
End: 2023-04-07

## 2023-04-14 ENCOUNTER — APPOINTMENT (OUTPATIENT)
Dept: PAIN MANAGEMENT | Facility: CLINIC | Age: 83
End: 2023-04-14
Payer: COMMERCIAL

## 2023-04-14 DIAGNOSIS — M46.1 SACROILIITIS, NOT ELSEWHERE CLASSIFIED: ICD-10-CM

## 2023-04-14 PROCEDURE — 99441: CPT

## 2023-04-18 ENCOUNTER — APPOINTMENT (OUTPATIENT)
Dept: CARDIOLOGY | Facility: CLINIC | Age: 83
End: 2023-04-18
Payer: COMMERCIAL

## 2023-04-18 VITALS
SYSTOLIC BLOOD PRESSURE: 158 MMHG | DIASTOLIC BLOOD PRESSURE: 88 MMHG | HEART RATE: 102 BPM | WEIGHT: 189 LBS | BODY MASS INDEX: 37.11 KG/M2 | HEIGHT: 60 IN

## 2023-04-18 DIAGNOSIS — R06.09 OTHER FORMS OF DYSPNEA: ICD-10-CM

## 2023-04-18 PROCEDURE — 93000 ELECTROCARDIOGRAM COMPLETE: CPT

## 2023-04-18 PROCEDURE — 99214 OFFICE O/P EST MOD 30 MIN: CPT | Mod: 25

## 2023-04-18 RX ORDER — OXYCODONE AND ACETAMINOPHEN 10; 325 MG/1; MG/1
10-325 TABLET ORAL DAILY
Qty: 30 | Refills: 0 | Status: DISCONTINUED | COMMUNITY
Start: 2023-03-31 | End: 2023-04-18

## 2023-04-19 PROBLEM — R06.09 DYSPNEA ON MINIMAL EXERTION: Status: ACTIVE | Noted: 2022-05-10

## 2023-04-19 NOTE — REVIEW OF SYSTEMS
[Feeling Fatigued] : feeling fatigued [SOB] : shortness of breath [Dyspnea on exertion] : dyspnea during exertion [Negative] : Heme/Lymph [Chest Discomfort] : no chest discomfort [Lower Ext Edema] : no extremity edema [Leg Claudication] : no intermittent leg claudication [Palpitations] : no palpitations [Orthopnea] : no orthopnea [Syncope] : no syncope [Cough] : no cough [Dizziness] : no dizziness

## 2023-04-19 NOTE — HISTORY OF PRESENT ILLNESS
[FreeTextEntry1] : 82F  (no gDM, gHTN, or preeclampsia) with DLD, aneurysm, hemorrhagic stroke  s/p embo with residual L sided weakness, DVT/PE 2023 on Eliquis here for follow-up. \par \par 23\par 2 hospitalizations and 1 month of subacute rehab since last visit. PNA, COVID, DVT/PE 2023\par \par Continues to have dyspnea with talking and minimal exertion. No epistaxis, melena or hematuria. \par \par gabapentin 300 mg TID\par prednisone x5 days\par Eliquis 5 mg BID\par \par 22\par Continues to have SOB. No chest pain, palpitations, lightheadedness/dizziness. \par \par BP at home\par ranging 130//90 \par over 140 every other day \par \par 5/10/22\par SOB with 1 flight of stairs, less than 50 feet. Takes 2-3 minutes to recover. No SOB at rest. No chest pain, unsure of leg swelling. \par \par Joint pain neck, back knees shoulders. Sciatica, recently prescribed prednisone. \par \par Quits smoking in her 20s \par \par No history of T2DM, never on treatment for hypertension\par \par \par \par \par

## 2023-04-19 NOTE — ASSESSMENT
[FreeTextEntry1] : Assessment:\par #PE/DVT 1/2023\par - R lower lobe PE\par - LE venous US 1/2023 R CFV, fem, pop DVT, L pop DVT\par #DLD\par - 4/21/22 , , HDL 66,  before starting statin\par - 11/14/22 , , , LDL 94 on atorva 10\par #S/p IVC filter implant\par #Hxo cerebral aneurysm \par #BMI 34\par \par Plan:\par - Echo to assess RV function after PE\par - Bilateral LE venous US for surveillance of DVT\par - Continue Eliquis 5 mg BID\par - Continue Amlodipine 2.5 mg QD, will titrate up next visit if BP elevated\par - Continue atorvastatin 10 mg daily \par - Follow-up with PCP and pulmonary \par - Return to clinic in 3 months\par \par

## 2023-04-19 NOTE — CARDIOLOGY SUMMARY
[de-identified] : EKG 5/10/22 Normal sinus rhythm 69 bpm\par EKG 4/18/23 sinus tachy 102 bpm, LAD [de-identified] : 6/8/22 TTE  EF 66%, no AI, no pHTN, hepatic cyst as per pt not a new finding  [de-identified] : Nuclear Stress test 6/21/22 EF 55% negative for ischemia\par

## 2023-04-19 NOTE — PHYSICAL EXAM
[Well Developed] : well developed [Well Nourished] : well nourished [No Acute Distress] : no acute distress [Normal Conjunctiva] : normal conjunctiva [Normal Venous Pressure] : normal venous pressure [No Carotid Bruit] : no carotid bruit [Normal S1, S2] : normal S1, S2 [No Murmur] : no murmur [No Rub] : no rub [No Gallop] : no gallop [Clear Lung Fields] : clear lung fields [Good Air Entry] : good air entry [No Respiratory Distress] : no respiratory distress  [Soft] : abdomen soft [Non Tender] : non-tender [Moves all extremities] : moves all extremities [No edema] : no edema [No varicosities] : no varicosities [No chronic venous stasis changes] : no chronic venous stasis changes [No rashes] : no rashes [Present] : present bilaterally [de-identified] : No calf tenderness to palpation

## 2023-05-01 ENCOUNTER — APPOINTMENT (OUTPATIENT)
Dept: PAIN MANAGEMENT | Facility: CLINIC | Age: 83
End: 2023-05-01
Payer: COMMERCIAL

## 2023-05-01 PROCEDURE — 27096 INJECT SACROILIAC JOINT: CPT | Mod: RT

## 2023-05-02 ENCOUNTER — RX RENEWAL (OUTPATIENT)
Age: 83
End: 2023-05-02

## 2023-05-02 RX ORDER — AMLODIPINE BESYLATE 2.5 MG/1
2.5 TABLET ORAL DAILY
Qty: 90 | Refills: 3 | Status: ACTIVE | COMMUNITY
Start: 2022-06-21 | End: 1900-01-01

## 2023-05-02 NOTE — PROCEDURE
[FreeTextEntry3] : Date of Service: 05/01/2023 \par \par Account: [id number]\par \par Patient: WILFREDO CHO \par \par YOB: 1940\par \par Age: 82 year\par \par Surgeon: Petr Arellano DO\par \par Assistant: None\par \par Pre - Operative Diagnosis:       right sacroiliitis     \par \par Post - Operative Diagnosis:     Same            \par \par Procedure:             right Sacroiliac arthrogram and joint injection under fluoroscopic guidance\par \par This procedure was carried out using fluoroscopic guidance.  The risks and benefits of the procedure were discussed extensively with the patient.  The consent of the patient was obtained and the following procedure was performed. The patient was placed in the prone position on the fluoroscopy table and the area was prepped and draped in a sterile fashion.  A timeout was performed with all essential staff present and the site and side were verified.\par \par The patient was placed in the prone position.  The patient's back was prepped and draped in a sterile fashion.  The fluoroscopic image intensifier was then positioned amalia maximize visualization of the joint.  This was achieved with slight cephalad and medial angulation. \par \par - The area corresponding to the right inferior SIJ space was then identified and marked.  A 25 gauge 3.5 inch spinal needle was then inserted and directed into the right sacroiliac joint space using fluoroscopic guidance.  After negative aspiration for heme and CSF, 2 cc of omnipaque was injected and appeared to fill the joint space.  An injectate of 1.5 cc 0.25% Marcaine plus 10mg was then injected into the right sacroiliac joint space.\par \par The needle was subsequently removed.  Vital signs remained normal.  Pulse oximeter was used throughout the procedure and the patient's pulse and oxygen saturation remained within normal limits.  The patient tolerated the procedure well.  There were no complications.  The patient was instructed to apply ice over the injection sites for twenty minutes every two hours for the next 24 to 48 hours.\par \par Disposition:\par \par       1. The patient was advised to F/U in 1-2 weeks to assess the response to the injection.\par       2. The patient was also instructed to contact me immediately if there were any concerns related to the procedure performed.

## 2023-05-15 ENCOUNTER — OUTPATIENT (OUTPATIENT)
Dept: OUTPATIENT SERVICES | Facility: HOSPITAL | Age: 83
LOS: 1 days | End: 2023-05-15
Payer: COMMERCIAL

## 2023-05-15 ENCOUNTER — RESULT REVIEW (OUTPATIENT)
Age: 83
End: 2023-05-15

## 2023-05-15 DIAGNOSIS — Z13.820 ENCOUNTER FOR SCREENING FOR OSTEOPOROSIS: ICD-10-CM

## 2023-05-15 DIAGNOSIS — Z90.49 ACQUIRED ABSENCE OF OTHER SPECIFIED PARTS OF DIGESTIVE TRACT: Chronic | ICD-10-CM

## 2023-05-15 DIAGNOSIS — Z98.890 OTHER SPECIFIED POSTPROCEDURAL STATES: Chronic | ICD-10-CM

## 2023-05-15 PROCEDURE — 77080 DXA BONE DENSITY AXIAL: CPT

## 2023-05-16 ENCOUNTER — APPOINTMENT (OUTPATIENT)
Dept: PAIN MANAGEMENT | Facility: CLINIC | Age: 83
End: 2023-05-16
Payer: COMMERCIAL

## 2023-05-16 DIAGNOSIS — Z13.820 ENCOUNTER FOR SCREENING FOR OSTEOPOROSIS: ICD-10-CM

## 2023-05-16 PROCEDURE — 99214 OFFICE O/P EST MOD 30 MIN: CPT

## 2023-05-18 NOTE — PHYSICAL EXAM
[de-identified] : Lumbar Spine Exam:\par Inspection:\par erythema (-)\par ecchymosis (-)\par rashes (-)\par alignment: no scoliosis\par \par Palpation:\par Midline lumbar tenderness:             (-)\par paraspinal tenderness:                  L (-) ; R (+)\par sciatic nerve tenderness :             L (-) ; R (+)\par SI joint tenderness:                        L (-) ; R (+)\par GTB tenderness:                            L (-);  R (-)\par \par Limited ROM 2/2 to pain in lumbar extension\par \par Strength:\par 5/5 throughout all muscle groups of the lower extremity\par \par                                    Right       Left   \par Hip Flexion:                (5/5)       (4+/5)\par Quadriceps:               (5/5)       (4+5)\par Hamstrings:                (5/5)       (4+/5)\par Ankle Dorsiflexion:     (5/5)       (4+/5)\par EHL:                           (5/5)       (4+/5)\par Ankle Plantarflexion:  (5/5)       (4+/5)\par \par Special Tests:\par SLR:                           R (+) ; L (-)\par Facet loading:            R (-) ; L (-)\par HUBER test:               R (-) ; L (-)\par \par Neurologic:\par Light touch intact throughout LE \par Reflexes normal and symmetric \par \par Gait:\par antalgic gait\par ambulates w/o assistive device\par

## 2023-05-18 NOTE — DISCUSSION/SUMMARY
[de-identified] : Recommendation\par 1. Treatment options were discussed. The patient has been having persistent, severe low back pain and lumbar radicular pain that has minimally improved with conservative therapy thus far (including physical therapy, home stretching and anti-inflammatory medications. The patient was given the option of proceeding with a lumbar epidural steroid injection to try and get the patient some pain relief. The risks and benefits were discussed, which included the associated problems with steroids, bleeding, nerve injury, lack of improvement with pain, and 0.5% chance of a post dural puncture headache.\par \par Home exercises\par Home exercise program: Patient given specific home exercises to do including but not limited to: pelvic tilt, knee to chest, and lower trunk rotation.\par \par f/u 4 weeks post injection\par

## 2023-05-18 NOTE — HISTORY OF PRESENT ILLNESS
[FreeTextEntry1] : HISTORY OF PRESENT ILLNESS: This is a 82 year old woman complaining of back pain. The patient has had this pain for months. The pain started after illness. Patient describes pain as moderate to severe. During the last month the pain has been constant and in the morning. Pain described as throbbing. Pain is associated with numbness and pins and needles. Patient has weakness in the bilateral upper and lower extremties. Pain is increased with standing, sitting, relaxation, cough/sneezing and bowel movements.  Pain is decreased with lying down, walking and relaxation.  Bowel or bladder habits have not changed. \par   \par Aggie also complaints of right sided low back pain that refers to right buttock, right posterior thigh, right lateral lower leg and into the right lateral foot. She reports sharp stabbing pain that burns down the leg. Pain is consistent and all of the time, but made worse with standing and activity with some relief when sitting. The pain has been ongoing for over two months without resolution. She experienced similar symptoms in the past, but the pain would resolve on its own. She does use oxycodone 20mg daily PRN for the pain relief, but is uninterested in maintaining that as a pain management modality. She is interested in other options. \par \par ACTIVITIES: Patient could walk less than a block before the pain starts. Patient can sit 4 hours before pain starts. Patient can stand 15 minutes before pain starts. The patient often lays down because of pain. Patient uses a walker at this time. Patient has difficulty performing household chores, doing yardwork or shopping, socializing with friends, participating in recreational activities and exercising at this time. \par  \par PRIOR PAIN TREATMENTS: Patient had moderate relief with heat treatment.\par  \par PRIOR PAIN MEDICATIONS: Fentanyl, Dilaudid, OxyContin, Percocet, Tramadol, Tylenol, Naproxen, Flexeril\par \par \par 5/17/23\par Aggie presents for follow up. Her pain was only relieved by about 20% after the SIJ injection. She complains still of severe right sided low back pain that refers to the right buttock, right posterior thigh, right lateral lower leg, and into the right lateral/anterior lower foot. The pain is sharp, stabbing pain and burns down along that dermatome. pain is there all of the time and made worse with standing and activity with some relief with sitting. Pain is very severe at this time.  Pain is 8/10 on the pain scale. Patient denies any bowel or bladder dysfunction, incontinence, or saddle anesthesia. \par

## 2023-05-18 NOTE — DATA REVIEWED
[FreeTextEntry1] : MRI L spine w/o contrast\par Multiple compression fractures noted in the L spine\par Moderate canal stenosis \par

## 2023-05-22 ENCOUNTER — APPOINTMENT (OUTPATIENT)
Dept: PAIN MANAGEMENT | Facility: CLINIC | Age: 83
End: 2023-05-22

## 2023-05-22 ENCOUNTER — APPOINTMENT (OUTPATIENT)
Dept: PAIN MANAGEMENT | Facility: CLINIC | Age: 83
End: 2023-05-22
Payer: COMMERCIAL

## 2023-05-22 PROCEDURE — 62323 NJX INTERLAMINAR LMBR/SAC: CPT

## 2023-05-23 ENCOUNTER — OUTPATIENT (OUTPATIENT)
Dept: OUTPATIENT SERVICES | Facility: HOSPITAL | Age: 83
LOS: 1 days | End: 2023-05-23
Payer: COMMERCIAL

## 2023-05-23 ENCOUNTER — APPOINTMENT (OUTPATIENT)
Dept: HEMATOLOGY ONCOLOGY | Facility: CLINIC | Age: 83
End: 2023-05-23
Payer: COMMERCIAL

## 2023-05-23 VITALS
TEMPERATURE: 98 F | SYSTOLIC BLOOD PRESSURE: 179 MMHG | WEIGHT: 165 LBS | HEART RATE: 109 BPM | DIASTOLIC BLOOD PRESSURE: 100 MMHG | HEIGHT: 60 IN | RESPIRATION RATE: 16 BRPM | BODY MASS INDEX: 32.39 KG/M2

## 2023-05-23 DIAGNOSIS — J44.9 CHRONIC OBSTRUCTIVE PULMONARY DISEASE, UNSPECIFIED: ICD-10-CM

## 2023-05-23 DIAGNOSIS — D68.9 COAGULATION DEFECT, UNSPECIFIED: ICD-10-CM

## 2023-05-23 DIAGNOSIS — Z90.49 ACQUIRED ABSENCE OF OTHER SPECIFIED PARTS OF DIGESTIVE TRACT: Chronic | ICD-10-CM

## 2023-05-23 DIAGNOSIS — Z63.4 DISAPPEARANCE AND DEATH OF FAMILY MEMBER: ICD-10-CM

## 2023-05-23 DIAGNOSIS — Z98.890 OTHER SPECIFIED POSTPROCEDURAL STATES: Chronic | ICD-10-CM

## 2023-05-23 DIAGNOSIS — M19.90 UNSPECIFIED OSTEOARTHRITIS, UNSPECIFIED SITE: ICD-10-CM

## 2023-05-23 PROCEDURE — 99204 OFFICE O/P NEW MOD 45 MIN: CPT

## 2023-05-23 SDOH — SOCIAL STABILITY - SOCIAL INSECURITY: DISSAPEARANCE AND DEATH OF FAMILY MEMBER: Z63.4

## 2023-05-23 NOTE — ASSESSMENT
[FreeTextEntry1] : DVT, PE provoked, while she was hospitalized for almost 3 weeks in addition to Covid infection at the same time.\par The patient has no family history of DVT or PE and has never had a problem until this January. Even when she had the brain event and had a Mihir filter inserted, she did not suffer from any thromboembolic condition.\par The situation was discussed with the patient.\par She was explained the difference between provoked and unprovoked thromboembolism. \par At this time, the patient was recommended to complete 6 months of anticoagulation with Eliquis then to see us for follow up a month later. At that time, will check the D-Dimers before deciding whether to resume or discontinue for good the anticoagulation.\par All these were detailed and reasoning explained.\par In addition, she was recommended losing weight and some dietary guideline given. Also, she was recommended to remain active as much as she could tolerate.\par \par All questions answered

## 2023-05-23 NOTE — HISTORY OF PRESENT ILLNESS
[Disease:__________________________] : Disease: [unfilled] [de-identified] : The patient is an 83 year old white female referred by Anabella Garcia and Boby for thrombophilia evaluation.\par The patient was initially admitted in late December of 2022 to the hospital for pneumonia. Apparently she remained hospitalized for almost 3 weeks. In addition to the pneumonia, she was found to have a compression fracture of T5 and T6. She was eventually discharged on oxygen then she went back to the hospital with swollen legs and SOB. However, she was apparently having some shortness of breath during the first hospitalization and also had a cough but she was having lot of pain from the back. She was found to have a DVT in the left leg and pulmonary embolism and that was essentially the reason for the second admission. In admission, she was diagnosed with Covid which was treated with remdesevir. She was started on anticoagulation and discharged on Eliquis.\par She has been up to date with colonoscopies. No history of polyps. She was found to have diverticular disease. Last mammogram was more than 7 years ago; she had history of cysts which were drained. She doesn't remember the last time she had a Pap smear.\par The patient reports weight gain over the last year or year and a half "because of lack of activity".\par

## 2023-05-23 NOTE — PROCEDURE
[FreeTextEntry3] : Date of Service: 05/22/2023 \par \par Account: [id number]\par \par Patient: WILFREDO CHO \par \par YOB: 1940\par \par Age: 83 year\par \par Surgeon:      Petr Arellano DO\par \par Assistant:    None\par \par Pre-Operative Diagnosis:         Lumbar radiculopathy M54.16\par \par Post Operative Diagnosis:       Lumbar radiculopathy M54.16 \par \par Procedure:             Lumbar interlaminar (L5-S1) epidural steroid injection under fluoroscopic guidance\par \par Anesthesia:            none\par \par This procedure was carried out using fluoroscopic guidance.  The risks and benefits of the procedure were discussed extensively with the patient.  The consent of the patient was obtained and the following procedure was performed. The patient was placed in the prone position on the fluoroscopy table and the area was prepped and draped in a sterile fashion.  A timeout was performed with all essential staff present and the site and side were verified.\par \par The patient was placed in the prone position with a pillow under the abdomen to minimize the lumbar lordosis.  The lumbar area was prepped and draped in a sterile fashion.  Under A/P view with slight cephalad-caudad angulation, the L5-S1 interspace was identified and marked.  Using sterile technique the superficial skin was anesthetized with 1% Lidocaine.  A 20 gauge Tuohy needle was advanced into the epidural space under fluoroscopy using loss of resistance at the L5-S1 level.  After negative aspiration for heme or CSF, an epidurogram was obtained in the A/P and lateral fluoroscopic views using 2-3 cc of Omnipaque contrast confirming epidural placement of the needle.  After this, 5 cc of of a mixture of preservative free normal saline and 20mg decadron were injected into the epidural space. \par \par The needle was subsequently removed.  Vital signs remained normal.  Pulse oximeter was used throughout the procedure and the patient's pulse and oxygen saturation remained within normal limits.  The patient tolerated the procedure well.  There were no complications.  The patient was instructed to apply ice over the injection sites for twenty minutes every two hours for the next 24 to 48 hours.\par \par Disposition:\par \par      1. The patient was advised to F/U in 1-2 weeks to assess the response to the injection.\par      2. The patient was also instructed to contact me immediately if there were any concerns related to the procedure performed.

## 2023-05-23 NOTE — PHYSICAL EXAM
[Restricted in physically strenuous activity but ambulatory and able to carry out work of a light or sedentary nature] : Status 1- Restricted in physically strenuous activity but ambulatory and able to carry out work of a light or sedentary nature, e.g., light house work, office work [Obese] : obese [Normal] : affect appropriate [de-identified] : Eyeglasses noted [de-identified] : But somewhat distant sounds [de-identified] : Some varicosities and trace edema bilaterally [de-identified] : Arthritic changes [de-identified] : Using a walker now since her hospitalization (not using it at home).

## 2023-05-23 NOTE — REVIEW OF SYSTEMS
[Recent Change In Weight] : ~T recent weight change [Vision Problems] : vision problems [Lower Ext Edema] : lower extremity edema [SOB on Exertion] : shortness of breath during exertion [Joint Pain] : joint pain [Joint Stiffness] : joint stiffness [Difficulty Walking] : difficulty walking [Insomnia] : insomnia [Easy Bruising] : a tendency for easy bruising [Negative] : Integumentary [FreeTextEntry3] : Burning [FreeTextEntry9] : Knees and back problems [de-identified] : Using a walker

## 2023-05-23 NOTE — CONSULT LETTER
[Dear  ___] : Dear  [unfilled], [Consult Letter:] : I had the pleasure of evaluating your patient, [unfilled]. [Please see my note below.] : Please see my note below. [Consult Closing:] : Thank you very much for allowing me to participate in the care of this patient.  If you have any questions, please do not hesitate to contact me. [Sincerely,] : Sincerely, [DrJuliano  ___] : Dr. DELGADO [FreeTextEntry2] : Dr. Enriqueta Landis [FreeTextEntry3] : Dr. RICHARDSON Cooley

## 2023-05-24 DIAGNOSIS — D68.9 COAGULATION DEFECT, UNSPECIFIED: ICD-10-CM

## 2023-05-24 DIAGNOSIS — I82.409 ACUTE EMBOLISM AND THROMBOSIS OF UNSPECIFIED DEEP VEINS OF UNSPECIFIED LOWER EXTREMITY: ICD-10-CM

## 2023-05-24 DIAGNOSIS — I26.99 OTHER PULMONARY EMBOLISM WITHOUT ACUTE COR PULMONALE: ICD-10-CM

## 2023-06-05 ENCOUNTER — APPOINTMENT (OUTPATIENT)
Dept: PAIN MANAGEMENT | Facility: CLINIC | Age: 83
End: 2023-06-05
Payer: COMMERCIAL

## 2023-06-05 VITALS — WEIGHT: 165 LBS | BODY MASS INDEX: 32.39 KG/M2 | HEIGHT: 60 IN

## 2023-06-05 DIAGNOSIS — M25.511 PAIN IN RIGHT SHOULDER: ICD-10-CM

## 2023-06-05 PROCEDURE — 99214 OFFICE O/P EST MOD 30 MIN: CPT

## 2023-06-06 ENCOUNTER — APPOINTMENT (OUTPATIENT)
Dept: CARDIOLOGY | Facility: CLINIC | Age: 83
End: 2023-06-06
Payer: COMMERCIAL

## 2023-06-06 PROCEDURE — 93306 TTE W/DOPPLER COMPLETE: CPT

## 2023-06-07 NOTE — DISCUSSION/SUMMARY
[de-identified] : A discussion regarding available pain management treatment options occurred with the patient.  These included interventional, rehabilitative, pharmacological, and alternative modalities. We will proceed with the following:\par \par Interventional treatment options:\par - None indicated at present time\par \par Rehabilitative options:\par - participation in active HEP was discussed\par Home exercise program: Patient given specific home exercises to do including but not limited to: pelvic tilt, knee to chest, and lower trunk rotation.\par \par Medication based treatment options:\par continue with Meloxicam 7.5 PRN\par I advised AGGIE that the NSAID should be taken with food.  In addition while taking the prescribed NSAID, no over the counter or other NSAIDs should be used, such as ibuprofen (Motrin or Advil) or naproxen (Aleve) as this can cause stomach upset or other side effects.  If needed for fever or breakthrough pain Tylenol can be used. \par \par   I educated Aggie on using the nonsteroidal anti-inflammatory medicines sparingly and not every day routinely especially when she has these with the pain as mild because it can irritate your stomach lining and reduced blood flow to the kidneys.  She understands and will be compliant.\par \par Complementary treatment options:\par - lifestyle modifications discussed\par \par Image:\par Ordered BL x-ray of knee \par \par follow up 3 months but was advised to come in if pain returns \par \par  she can follow-up sooner or as needed if her knee pain flares up.  I will call her with the results of the x-ray imaging.\par \par CARMENCITA Laura attest that this documentation has been prepared under the direction and in the presence of provider Dr. Petr Arellano. \par The documentation recorded by the scribe in my presence, accurately reflects the service I personally performed, and the decisions made by me with my edits as appropriate. \par \par Petr Arellano, DO\par

## 2023-06-07 NOTE — HISTORY OF PRESENT ILLNESS
[FreeTextEntry1] : HISTORY OF PRESENT ILLNESS: This is a 82 year old woman complaining of back pain. The patient has had this pain for months. The pain started after illness. Patient describes pain as moderate to severe. During the last month the pain has been constant and in the morning. Pain described as throbbing. Pain is associated with numbness and pins and needles. Patient has weakness in the bilateral upper and lower extremties. Pain is increased with standing, sitting, relaxation, cough/sneezing and bowel movements.  Pain is decreased with lying down, walking and relaxation.  Bowel or bladder habits have not changed. \par Aggie also complaints of right sided low back pain that refers to right buttock, right posterior thigh, right lateral lower leg and into the right lateral foot. She reports sharp stabbing pain that burns down the leg. Pain is consistent and all of the time, but made worse with standing and activity with some relief when sitting. The pain has been ongoing for over two months without resolution. She experienced similar symptoms in the past, but the pain would resolve on its own. She does use oxycodone 20mg daily PRN for the pain relief, but is uninterested in maintaining that as a pain management modality. She is interested in other options. \par \par ACTIVITIES: Patient could walk less than a block before the pain starts. Patient can sit 4 hours before pain starts. Patient can stand 15 minutes before pain starts. The patient often lays down because of pain. Patient uses a walker at this time. Patient has difficulty performing household chores, doing yardwork or shopping, socializing with friends, participating in recreational activities and exercising at this time. \par PRIOR PAIN TREATMENTS: Patient had moderate relief with heat treatment.\par PRIOR PAIN MEDICATIONS: Fentanyl, Dilaudid, OxyContin, Percocet, Tramadol, Tylenol, Naproxen, Flexeril\par \par 6/5/23: Today this patient is status post L5-S1 LESI on 5/22/23 with 80% of relief in the buttock and radicular symptoms. To review the pain is right low back that refer to the right buttock. The patient initially had pain that referred lower down to the posterior thigh was very sharp and stabbing pain.  After the injection, the pain is more localized in the upper part of the buttock/low back and is mild compared to her severe pain prior to the injection.\par \par She utilizes meloxicam everyday that helps her joint pain significantly. The patient reports of pain in left shoulder that is worse with over head activity along with bilateral knee pain that is worse when walking and better with sitting. \par \par Patient denies any bowel or bladder dysfunction, incontinence, or saddle anesthesia.\par

## 2023-06-07 NOTE — PHYSICAL EXAM
[de-identified] : Lumbar Spine Exam:\par Inspection:\par erythema (-)\par ecchymosis (-)\par rashes (-)\par alignment: no scoliosis\par \par Palpation:\par Midline lumbar tenderness:             (-)\par paraspinal tenderness:                  L (-) ; R (-)\par sciatic nerve tenderness :             L (-) ; R (+)\par SI joint tenderness:                        L (-) ; R (-)\par GTB tenderness:                            L (-);  R (-)\par \par Limited ROM 2/2 to pain in lumbar extension\par \par Strength:\par 5/5 throughout all muscle groups of the lower extremity\par \par                                    Right       Left   \par Hip Flexion:                (5/5)       (4+/5)\par Quadriceps:               (5/5)       (4+5)\par Hamstrings:                (5/5)       (4+/5)\par Ankle Dorsiflexion:     (5/5)       (4+/5)\par EHL:                           (5/5)       (4+/5)\par Ankle Plantarflexion:  (5/5)       (4+/5)\par \par Special Tests:\par SLR:                           R (+) ; L (-)\par Facet loading:            R (-) ; L (-)\par HUBER test:               R (-) ; L (-)\par \par Neurologic:\par Light touch intact throughout LE \par Reflexes normal and symmetric \par \par Gait:\par antalgic gait\par ambulates w/o assistive device\par \par \par left knee\par Inspection/palpation\par Negative swelling, erythema, warmth\par + tenderness to palpation at the medial and lateral joint line\par Negative crepitus\par \par ROM:\par Flexion 110 (normal 120-150)\par Extension 0 (normal 0-15)\par \par Tests: Negative anterior drawer test\par Negative Francheska's test\par There is no varus or valgus instability\par Quad strength is 5/5, hamstring strength is 5/5 \par \par Right knee\par Inspection/palpation\par Negative swelling, erythema, warmth\par + tenderness to palpation at the medial joint line\par Negative crepitus\par \par ROM:\par Flexion 110 (normal 120-150)\par Extension 0 (normal 0-15)\par \par Tests: Negative anterior drawer test\par Negative Francheska's test\par There is no varus or valgus instability\par Quad strength is 5/5, hamstring strength is 5/5

## 2023-06-28 ENCOUNTER — OUTPATIENT (OUTPATIENT)
Dept: OUTPATIENT SERVICES | Facility: HOSPITAL | Age: 83
LOS: 1 days | End: 2023-06-28
Payer: COMMERCIAL

## 2023-06-28 ENCOUNTER — APPOINTMENT (OUTPATIENT)
Age: 83
End: 2023-06-28

## 2023-06-28 DIAGNOSIS — Z98.890 OTHER SPECIFIED POSTPROCEDURAL STATES: Chronic | ICD-10-CM

## 2023-06-28 DIAGNOSIS — Z90.49 ACQUIRED ABSENCE OF OTHER SPECIFIED PARTS OF DIGESTIVE TRACT: Chronic | ICD-10-CM

## 2023-06-28 DIAGNOSIS — J44.9 CHRONIC OBSTRUCTIVE PULMONARY DISEASE, UNSPECIFIED: ICD-10-CM

## 2023-06-28 PROCEDURE — 94626 PHY/QHP OP PULM RHB W/MNTR: CPT

## 2023-06-29 DIAGNOSIS — J44.9 CHRONIC OBSTRUCTIVE PULMONARY DISEASE, UNSPECIFIED: ICD-10-CM

## 2023-07-11 ENCOUNTER — APPOINTMENT (OUTPATIENT)
Age: 83
End: 2023-07-11

## 2023-07-11 ENCOUNTER — OUTPATIENT (OUTPATIENT)
Dept: OUTPATIENT SERVICES | Facility: HOSPITAL | Age: 83
LOS: 1 days | End: 2023-07-11
Payer: COMMERCIAL

## 2023-07-11 DIAGNOSIS — Z90.49 ACQUIRED ABSENCE OF OTHER SPECIFIED PARTS OF DIGESTIVE TRACT: Chronic | ICD-10-CM

## 2023-07-11 DIAGNOSIS — J44.9 CHRONIC OBSTRUCTIVE PULMONARY DISEASE, UNSPECIFIED: ICD-10-CM

## 2023-07-11 DIAGNOSIS — Z98.890 OTHER SPECIFIED POSTPROCEDURAL STATES: Chronic | ICD-10-CM

## 2023-07-11 PROCEDURE — 94626 PHY/QHP OP PULM RHB W/MNTR: CPT

## 2023-07-13 ENCOUNTER — APPOINTMENT (OUTPATIENT)
Age: 83
End: 2023-07-13

## 2023-07-13 ENCOUNTER — OUTPATIENT (OUTPATIENT)
Dept: OUTPATIENT SERVICES | Facility: HOSPITAL | Age: 83
LOS: 1 days | End: 2023-07-13

## 2023-07-13 DIAGNOSIS — Z98.890 OTHER SPECIFIED POSTPROCEDURAL STATES: Chronic | ICD-10-CM

## 2023-07-13 DIAGNOSIS — J44.9 CHRONIC OBSTRUCTIVE PULMONARY DISEASE, UNSPECIFIED: ICD-10-CM

## 2023-07-13 DIAGNOSIS — Z90.49 ACQUIRED ABSENCE OF OTHER SPECIFIED PARTS OF DIGESTIVE TRACT: Chronic | ICD-10-CM

## 2023-07-18 ENCOUNTER — APPOINTMENT (OUTPATIENT)
Dept: CARDIOLOGY | Facility: CLINIC | Age: 83
End: 2023-07-18
Payer: COMMERCIAL

## 2023-07-18 ENCOUNTER — APPOINTMENT (OUTPATIENT)
Age: 83
End: 2023-07-18

## 2023-07-20 ENCOUNTER — OUTPATIENT (OUTPATIENT)
Dept: OUTPATIENT SERVICES | Facility: HOSPITAL | Age: 83
LOS: 1 days | End: 2023-07-20

## 2023-07-20 ENCOUNTER — APPOINTMENT (OUTPATIENT)
Age: 83
End: 2023-07-20

## 2023-07-20 DIAGNOSIS — J44.9 CHRONIC OBSTRUCTIVE PULMONARY DISEASE, UNSPECIFIED: ICD-10-CM

## 2023-07-20 DIAGNOSIS — Z90.49 ACQUIRED ABSENCE OF OTHER SPECIFIED PARTS OF DIGESTIVE TRACT: Chronic | ICD-10-CM

## 2023-07-20 DIAGNOSIS — Z98.890 OTHER SPECIFIED POSTPROCEDURAL STATES: Chronic | ICD-10-CM

## 2023-07-21 ENCOUNTER — APPOINTMENT (OUTPATIENT)
Dept: RHEUMATOLOGY | Facility: CLINIC | Age: 83
End: 2023-07-21
Payer: COMMERCIAL

## 2023-07-21 VITALS
SYSTOLIC BLOOD PRESSURE: 144 MMHG | HEIGHT: 60 IN | BODY MASS INDEX: 33.18 KG/M2 | HEART RATE: 89 BPM | DIASTOLIC BLOOD PRESSURE: 89 MMHG | WEIGHT: 169 LBS

## 2023-07-21 DIAGNOSIS — M81.0 AGE-RELATED OSTEOPOROSIS W/OUT CURRENT PATHOLOGICAL FRACTURE: ICD-10-CM

## 2023-07-21 DIAGNOSIS — M79.642 PAIN IN RIGHT HAND: ICD-10-CM

## 2023-07-21 DIAGNOSIS — M79.641 PAIN IN RIGHT HAND: ICD-10-CM

## 2023-07-21 DIAGNOSIS — R76.8 OTHER SPECIFIED ABNORMAL IMMUNOLOGICAL FINDINGS IN SERUM: ICD-10-CM

## 2023-07-21 PROCEDURE — 99204 OFFICE O/P NEW MOD 45 MIN: CPT

## 2023-07-21 RX ORDER — PREDNISONE 20 MG/1
20 TABLET ORAL DAILY
Refills: 0 | Status: COMPLETED | COMMUNITY
End: 2023-07-21

## 2023-07-21 NOTE — ASSESSMENT
[FreeTextEntry1] : Hand pain/borderline BETHEL: Low suspicion that pt's hand pain, or her other musculoskeletal pain, is related to her borderline BETHEL in 2022. The borderline BETHEL is a non-specific finding, and she does not otherwise have other symptoms or exam findings to suggest an underlying systemic connective tissue disease. Unclear why pt's BETEHL was checked in the first place as there is no suspicion for ILD or another pulmonary process which can be a/w an underlying systemic connective tissue disease. Her hand symptoms are typical for OA.\par - f/u CCP and repeat inflammatory markers\par - f/u b/l hand x-rays\par - Advised pt to try using topical treatments for hand pain - lidocaine patches, Voltaren gel, arnica gel\par - Advised pt to try thumb-based spica splint for CMC pain\par - Offered pt steroid injections for CMC joints but she declined\par \par f/u prn, will call pt with lab and x-ray results

## 2023-07-21 NOTE — HISTORY OF PRESENT ILLNESS
[FreeTextEntry1] : A few weeks ago, pt noticed constant pain in her R>L CMC joints radiating into the thumbs, and in her b/l wrists. The pain tends to be worst at the end of the day, and has caused trouble falling asleep. Pt denies numbness, tingling, swelling, or known triggers. Also + long-standing pain in b/l knees, shoulders, back. She sees pain management for these symptoms, had epidural with improvement. She was also prescribed meloxicam, which helped with her back and shoulders but made no difference to her hand pain.\par \par Pt sees pulmonology for COPD, had labs in 2/2022 which demonstrated BETHEL 1:80 so she was recommended by pulm to see a rheumatologist. Other labs demonstrated negative RF, Scl70, Maria Guadalupe-1, Ro, La.\par \par + Recently diagnosed osteoporosis with spinal compression deformities. Pt started Boniva on July 1st.\par \par Physical exam: GEN: Pleasant, AAO woman sitting on exam table in NAD\par SKIN: No rashes\par PULM: Clear to auscultation b/l\par CV: Regular rate and rhythm, no murmurs\par MSK:\par Shoulders: Limited abduction to 90 degrees b/l with pain on ROM\par Elbows: Full ROM b/l, no effusions\par Wrists: + Mild pain with flexion and extension b/l, no effusions, + Finkelstein on R\par Hands: +  CMC squaring with TTP in R\par Hips: Full ROM b/l\par Knees: no effusions, full ROM b/l\par Ankles: + Soft tissue edema b/l, full ROM b/l\par Feet: + Soft tissue edema, no TTP\par Back: + Kyphosis\par EXT: + LE edema to knees b/l with venous stasis changes in LEs

## 2023-07-21 NOTE — REASON FOR VISIT
[Initial Evaluation] : an initial evaluation [FreeTextEntry1] : Pain in b/l hands x weeks, low +BETHEL in past

## 2023-07-25 ENCOUNTER — OUTPATIENT (OUTPATIENT)
Dept: OUTPATIENT SERVICES | Facility: HOSPITAL | Age: 83
LOS: 1 days | End: 2023-07-25

## 2023-07-25 ENCOUNTER — APPOINTMENT (OUTPATIENT)
Age: 83
End: 2023-07-25

## 2023-07-25 DIAGNOSIS — J44.9 CHRONIC OBSTRUCTIVE PULMONARY DISEASE, UNSPECIFIED: ICD-10-CM

## 2023-07-25 DIAGNOSIS — Z98.890 OTHER SPECIFIED POSTPROCEDURAL STATES: Chronic | ICD-10-CM

## 2023-07-25 DIAGNOSIS — Z90.49 ACQUIRED ABSENCE OF OTHER SPECIFIED PARTS OF DIGESTIVE TRACT: Chronic | ICD-10-CM

## 2023-07-27 ENCOUNTER — APPOINTMENT (OUTPATIENT)
Age: 83
End: 2023-07-27

## 2023-08-01 ENCOUNTER — APPOINTMENT (OUTPATIENT)
Age: 83
End: 2023-08-01

## 2023-08-03 ENCOUNTER — APPOINTMENT (OUTPATIENT)
Age: 83
End: 2023-08-03

## 2023-08-03 ENCOUNTER — OUTPATIENT (OUTPATIENT)
Dept: OUTPATIENT SERVICES | Facility: HOSPITAL | Age: 83
LOS: 1 days | End: 2023-08-03
Payer: COMMERCIAL

## 2023-08-03 DIAGNOSIS — J44.9 CHRONIC OBSTRUCTIVE PULMONARY DISEASE, UNSPECIFIED: ICD-10-CM

## 2023-08-03 DIAGNOSIS — Z90.49 ACQUIRED ABSENCE OF OTHER SPECIFIED PARTS OF DIGESTIVE TRACT: Chronic | ICD-10-CM

## 2023-08-03 PROCEDURE — 94626 PHY/QHP OP PULM RHB W/MNTR: CPT

## 2023-08-08 ENCOUNTER — APPOINTMENT (OUTPATIENT)
Age: 83
End: 2023-08-08

## 2023-08-08 ENCOUNTER — OUTPATIENT (OUTPATIENT)
Dept: OUTPATIENT SERVICES | Facility: HOSPITAL | Age: 83
LOS: 1 days | End: 2023-08-08

## 2023-08-08 DIAGNOSIS — J44.9 CHRONIC OBSTRUCTIVE PULMONARY DISEASE, UNSPECIFIED: ICD-10-CM

## 2023-08-08 DIAGNOSIS — Z98.890 OTHER SPECIFIED POSTPROCEDURAL STATES: Chronic | ICD-10-CM

## 2023-08-08 DIAGNOSIS — Z90.49 ACQUIRED ABSENCE OF OTHER SPECIFIED PARTS OF DIGESTIVE TRACT: Chronic | ICD-10-CM

## 2023-08-10 ENCOUNTER — OUTPATIENT (OUTPATIENT)
Dept: OUTPATIENT SERVICES | Facility: HOSPITAL | Age: 83
LOS: 1 days | End: 2023-08-10

## 2023-08-10 ENCOUNTER — APPOINTMENT (OUTPATIENT)
Age: 83
End: 2023-08-10

## 2023-08-10 DIAGNOSIS — Z98.890 OTHER SPECIFIED POSTPROCEDURAL STATES: Chronic | ICD-10-CM

## 2023-08-10 DIAGNOSIS — J44.9 CHRONIC OBSTRUCTIVE PULMONARY DISEASE, UNSPECIFIED: ICD-10-CM

## 2023-08-10 DIAGNOSIS — Z90.49 ACQUIRED ABSENCE OF OTHER SPECIFIED PARTS OF DIGESTIVE TRACT: Chronic | ICD-10-CM

## 2023-08-15 ENCOUNTER — APPOINTMENT (OUTPATIENT)
Age: 83
End: 2023-08-15

## 2023-08-17 ENCOUNTER — APPOINTMENT (OUTPATIENT)
Age: 83
End: 2023-08-17

## 2023-08-17 ENCOUNTER — OUTPATIENT (OUTPATIENT)
Dept: OUTPATIENT SERVICES | Facility: HOSPITAL | Age: 83
LOS: 1 days | End: 2023-08-17

## 2023-08-17 DIAGNOSIS — Z98.890 OTHER SPECIFIED POSTPROCEDURAL STATES: Chronic | ICD-10-CM

## 2023-08-17 DIAGNOSIS — J44.9 CHRONIC OBSTRUCTIVE PULMONARY DISEASE, UNSPECIFIED: ICD-10-CM

## 2023-08-17 DIAGNOSIS — Z90.49 ACQUIRED ABSENCE OF OTHER SPECIFIED PARTS OF DIGESTIVE TRACT: Chronic | ICD-10-CM

## 2023-08-22 ENCOUNTER — APPOINTMENT (OUTPATIENT)
Age: 83
End: 2023-08-22

## 2023-08-22 NOTE — ED ADULT TRIAGE NOTE - NS ED TRIAGE AVPU SCALE
Validate That Anesthesia Volume Is Not Zero (If You Leave At 0 It Will Not Render In Note): No Alert-The patient is alert, awake and responds to voice. The patient is oriented to time, place, and person. The triage nurse is able to obtain subjective information.

## 2023-08-24 ENCOUNTER — APPOINTMENT (OUTPATIENT)
Age: 83
End: 2023-08-24

## 2023-08-29 ENCOUNTER — OUTPATIENT (OUTPATIENT)
Dept: OUTPATIENT SERVICES | Facility: HOSPITAL | Age: 83
LOS: 1 days | End: 2023-08-29

## 2023-08-29 ENCOUNTER — APPOINTMENT (OUTPATIENT)
Age: 83
End: 2023-08-29

## 2023-08-29 DIAGNOSIS — Z98.890 OTHER SPECIFIED POSTPROCEDURAL STATES: Chronic | ICD-10-CM

## 2023-08-29 DIAGNOSIS — J44.9 CHRONIC OBSTRUCTIVE PULMONARY DISEASE, UNSPECIFIED: ICD-10-CM

## 2023-08-29 DIAGNOSIS — Z90.49 ACQUIRED ABSENCE OF OTHER SPECIFIED PARTS OF DIGESTIVE TRACT: Chronic | ICD-10-CM

## 2023-08-31 ENCOUNTER — APPOINTMENT (OUTPATIENT)
Age: 83
End: 2023-08-31

## 2023-09-05 ENCOUNTER — APPOINTMENT (OUTPATIENT)
Age: 83
End: 2023-09-05

## 2023-09-05 ENCOUNTER — APPOINTMENT (OUTPATIENT)
Dept: PAIN MANAGEMENT | Facility: CLINIC | Age: 83
End: 2023-09-05
Payer: COMMERCIAL

## 2023-09-05 VITALS — HEIGHT: 60 IN | WEIGHT: 169 LBS | BODY MASS INDEX: 33.18 KG/M2

## 2023-09-05 DIAGNOSIS — M17.12 UNILATERAL PRIMARY OSTEOARTHRITIS, LEFT KNEE: ICD-10-CM

## 2023-09-05 DIAGNOSIS — M43.06 SPONDYLOLYSIS, LUMBAR REGION: ICD-10-CM

## 2023-09-05 DIAGNOSIS — M17.11 UNILATERAL PRIMARY OSTEOARTHRITIS, RIGHT KNEE: ICD-10-CM

## 2023-09-05 DIAGNOSIS — M19.049 PRIMARY OSTEOARTHRITIS, UNSPECIFIED HAND: ICD-10-CM

## 2023-09-05 DIAGNOSIS — M54.16 RADICULOPATHY, LUMBAR REGION: ICD-10-CM

## 2023-09-05 PROCEDURE — 99214 OFFICE O/P EST MOD 30 MIN: CPT

## 2023-09-05 NOTE — HISTORY OF PRESENT ILLNESS
[FreeTextEntry1] : HISTORY OF PRESENT ILLNESS: This is a 82 year old woman complaining of back pain. The patient has had this pain for months. The pain started after illness. Patient describes pain as moderate to severe. During the last month the pain has been constant and in the morning. Pain described as throbbing. Pain is associated with numbness and pins and needles. Patient has weakness in the bilateral upper and lower extremties. Pain is increased with standing, sitting, relaxation, cough/sneezing and bowel movements.  Pain is decreased with lying down, walking and relaxation.  Bowel or bladder habits have not changed.  Aggie also complaints of right sided low back pain that refers to right buttock, right posterior thigh, right lateral lower leg and into the right lateral foot. She reports sharp stabbing pain that burns down the leg. Pain is consistent and all of the time, but made worse with standing and activity with some relief when sitting. The pain has been ongoing for over two months without resolution. She experienced similar symptoms in the past, but the pain would resolve on its own. She does use oxycodone 20mg daily PRN for the pain relief, but is uninterested in maintaining that as a pain management modality. She is interested in other options.   ACTIVITIES: Patient could walk less than a block before the pain starts. Patient can sit 4 hours before pain starts. Patient can stand 15 minutes before pain starts. The patient often lays down because of pain. Patient uses a walker at this time. Patient has difficulty performing household chores, doing yardwork or shopping, socializing with friends, participating in recreational activities and exercising at this time.  PRIOR PAIN TREATMENTS: Patient had moderate relief with heat treatment. PRIOR PAIN MEDICATIONS: Fentanyl, Dilaudid, OxyContin, Percocet, Tramadol, Tylenol, Naproxen, Flexeril  9/5/23: Today this patient is here for a follow up visit for her back and knee pain. The patient reports of pain being tolerable and managing her pain with Motrin and Tylenol, 3 times a day that helps her joint pain significantly. The pain level today is 2/10 on the pain scale. Overall, she's doing well and not interested in any tx options at this point.  Patient denies any bowel or bladder dysfunction, incontinence, or saddle anesthesia.

## 2023-09-05 NOTE — PHYSICAL EXAM
[de-identified] : Lumbar Spine Exam: Inspection: erythema (-) ecchymosis (-) rashes (-) alignment: no scoliosis  Palpation: Midline lumbar tenderness:             (-) paraspinal tenderness:                  L (-) ; R (-) sciatic nerve tenderness :             L (-) ; R (+) SI joint tenderness:                        L (-) ; R (-) GTB tenderness:                            L (-);  R (-)  Limited ROM 2/2 to pain in lumbar extension  Strength: 5/5 throughout all muscle groups of the lower extremity                                     Right       Left    Hip Flexion:                (5/5)       (4+/5) Quadriceps:               (5/5)       (4+5) Hamstrings:                (5/5)       (4+/5) Ankle Dorsiflexion:     (5/5)       (4+/5) EHL:                           (5/5)       (4+/5) Ankle Plantarflexion:  (5/5)       (4+/5)  Special Tests: SLR:                           R (+) ; L (-) Facet loading:            R (-) ; L (-) HUBER test:               R (-) ; L (-)  Neurologic: Light touch intact throughout LE  Reflexes normal and symmetric   Gait: antalgic gait ambulates w/o assistive device   left knee Inspection/palpation Negative swelling, erythema, warmth + tenderness to palpation at the medial and lateral joint line Negative crepitus  ROM: Flexion 110 (normal 120-150) Extension 0 (normal 0-15)  Tests: Negative anterior drawer test Negative Francheska's test There is no varus or valgus instability Quad strength is 5/5, hamstring strength is 5/5   Right knee Inspection/palpation Negative swelling, erythema, warmth + tenderness to palpation at the medial joint line Negative crepitus  ROM: Flexion 110 (normal 120-150) Extension 0 (normal 0-15)  Tests: Negative anterior drawer test Negative Francheska's test There is no varus or valgus instability Quad strength is 5/5, hamstring strength is 5/5

## 2023-09-05 NOTE — DISCUSSION/SUMMARY
[de-identified] : A discussion regarding available pain management treatment options occurred with the patient.  These included interventional, rehabilitative, pharmacological, and alternative modalities. We will proceed with the followin. diclofenac 3% gel to b/l hands, knees  follow up 6 months   I Tati Laura attest that this documentation has been prepared under the direction and in the presence of provider Dr. Petr Arellano.  The documentation recorded by the scribe in my presence, accurately reflects the service I personally performed, and the decisions made by me with my edits as appropriate.   Petr Arellano, DO

## 2023-09-07 ENCOUNTER — APPOINTMENT (OUTPATIENT)
Age: 83
End: 2023-09-07

## 2023-09-07 ENCOUNTER — OUTPATIENT (OUTPATIENT)
Dept: OUTPATIENT SERVICES | Facility: HOSPITAL | Age: 83
LOS: 1 days | End: 2023-09-07
Payer: COMMERCIAL

## 2023-09-07 DIAGNOSIS — Z90.49 ACQUIRED ABSENCE OF OTHER SPECIFIED PARTS OF DIGESTIVE TRACT: Chronic | ICD-10-CM

## 2023-09-07 DIAGNOSIS — J44.9 CHRONIC OBSTRUCTIVE PULMONARY DISEASE, UNSPECIFIED: ICD-10-CM

## 2023-09-07 DIAGNOSIS — Z98.890 OTHER SPECIFIED POSTPROCEDURAL STATES: Chronic | ICD-10-CM

## 2023-09-07 PROCEDURE — 94626 PHY/QHP OP PULM RHB W/MNTR: CPT

## 2023-09-08 DIAGNOSIS — J44.9 CHRONIC OBSTRUCTIVE PULMONARY DISEASE, UNSPECIFIED: ICD-10-CM

## 2023-09-12 ENCOUNTER — OUTPATIENT (OUTPATIENT)
Dept: OUTPATIENT SERVICES | Facility: HOSPITAL | Age: 83
LOS: 1 days | End: 2023-09-12

## 2023-09-12 ENCOUNTER — APPOINTMENT (OUTPATIENT)
Age: 83
End: 2023-09-12

## 2023-09-12 DIAGNOSIS — J44.9 CHRONIC OBSTRUCTIVE PULMONARY DISEASE, UNSPECIFIED: ICD-10-CM

## 2023-09-12 DIAGNOSIS — Z98.890 OTHER SPECIFIED POSTPROCEDURAL STATES: Chronic | ICD-10-CM

## 2023-09-12 DIAGNOSIS — Z90.49 ACQUIRED ABSENCE OF OTHER SPECIFIED PARTS OF DIGESTIVE TRACT: Chronic | ICD-10-CM

## 2023-09-14 ENCOUNTER — APPOINTMENT (OUTPATIENT)
Age: 83
End: 2023-09-14

## 2023-09-19 ENCOUNTER — APPOINTMENT (OUTPATIENT)
Age: 83
End: 2023-09-19

## 2023-09-21 ENCOUNTER — APPOINTMENT (OUTPATIENT)
Age: 83
End: 2023-09-21

## 2023-09-21 ENCOUNTER — OUTPATIENT (OUTPATIENT)
Dept: OUTPATIENT SERVICES | Facility: HOSPITAL | Age: 83
LOS: 1 days | End: 2023-09-21

## 2023-09-21 DIAGNOSIS — J44.9 CHRONIC OBSTRUCTIVE PULMONARY DISEASE, UNSPECIFIED: ICD-10-CM

## 2023-09-21 DIAGNOSIS — Z98.890 OTHER SPECIFIED POSTPROCEDURAL STATES: Chronic | ICD-10-CM

## 2023-09-21 DIAGNOSIS — Z90.49 ACQUIRED ABSENCE OF OTHER SPECIFIED PARTS OF DIGESTIVE TRACT: Chronic | ICD-10-CM

## 2023-09-26 ENCOUNTER — APPOINTMENT (OUTPATIENT)
Age: 83
End: 2023-09-26

## 2023-09-28 ENCOUNTER — APPOINTMENT (OUTPATIENT)
Age: 83
End: 2023-09-28

## 2023-10-03 ENCOUNTER — OUTPATIENT (OUTPATIENT)
Dept: OUTPATIENT SERVICES | Facility: HOSPITAL | Age: 83
LOS: 1 days | End: 2023-10-03
Payer: COMMERCIAL

## 2023-10-03 ENCOUNTER — APPOINTMENT (OUTPATIENT)
Dept: CARDIOLOGY | Facility: CLINIC | Age: 83
End: 2023-10-03
Payer: COMMERCIAL

## 2023-10-03 ENCOUNTER — APPOINTMENT (OUTPATIENT)
Age: 83
End: 2023-10-03

## 2023-10-03 VITALS — HEART RATE: 66 BPM | DIASTOLIC BLOOD PRESSURE: 74 MMHG | SYSTOLIC BLOOD PRESSURE: 122 MMHG

## 2023-10-03 VITALS — WEIGHT: 168 LBS | BODY MASS INDEX: 32.98 KG/M2 | HEIGHT: 60 IN | TEMPERATURE: 97 F

## 2023-10-03 DIAGNOSIS — I10 ESSENTIAL (PRIMARY) HYPERTENSION: ICD-10-CM

## 2023-10-03 DIAGNOSIS — Z90.49 ACQUIRED ABSENCE OF OTHER SPECIFIED PARTS OF DIGESTIVE TRACT: Chronic | ICD-10-CM

## 2023-10-03 DIAGNOSIS — Z98.890 OTHER SPECIFIED POSTPROCEDURAL STATES: Chronic | ICD-10-CM

## 2023-10-03 DIAGNOSIS — J44.9 CHRONIC OBSTRUCTIVE PULMONARY DISEASE, UNSPECIFIED: ICD-10-CM

## 2023-10-03 PROCEDURE — 99214 OFFICE O/P EST MOD 30 MIN: CPT | Mod: 25

## 2023-10-03 PROCEDURE — 93000 ELECTROCARDIOGRAM COMPLETE: CPT

## 2023-10-03 PROCEDURE — 94626 PHY/QHP OP PULM RHB W/MNTR: CPT

## 2023-10-03 RX ORDER — MELOXICAM 15 MG/1
15 TABLET ORAL
Qty: 30 | Refills: 0 | Status: DISCONTINUED | COMMUNITY
Start: 2023-05-17 | End: 2023-10-03

## 2023-10-03 RX ORDER — MELOXICAM 7.5 MG/1
7.5 TABLET ORAL
Qty: 30 | Refills: 0 | Status: DISCONTINUED | COMMUNITY
Start: 2023-06-05 | End: 2023-10-03

## 2023-10-03 RX ORDER — APIXABAN 5 MG/1
5 TABLET, FILM COATED ORAL
Refills: 0 | Status: DISCONTINUED | COMMUNITY
End: 2023-10-03

## 2023-10-03 RX ORDER — CALCITONIN SALMON 200 [IU]/1
200 SPRAY, METERED NASAL DAILY
Qty: 1 | Refills: 0 | Status: DISCONTINUED | COMMUNITY
Start: 2023-03-31 | End: 2023-10-03

## 2023-10-04 DIAGNOSIS — J44.9 CHRONIC OBSTRUCTIVE PULMONARY DISEASE, UNSPECIFIED: ICD-10-CM

## 2023-10-05 ENCOUNTER — APPOINTMENT (OUTPATIENT)
Age: 83
End: 2023-10-05

## 2023-10-10 ENCOUNTER — APPOINTMENT (OUTPATIENT)
Age: 83
End: 2023-10-10

## 2023-10-12 ENCOUNTER — OUTPATIENT (OUTPATIENT)
Dept: OUTPATIENT SERVICES | Facility: HOSPITAL | Age: 83
LOS: 1 days | End: 2023-10-12

## 2023-10-12 ENCOUNTER — APPOINTMENT (OUTPATIENT)
Age: 83
End: 2023-10-12

## 2023-10-12 DIAGNOSIS — J44.9 CHRONIC OBSTRUCTIVE PULMONARY DISEASE, UNSPECIFIED: ICD-10-CM

## 2023-10-12 DIAGNOSIS — Z90.49 ACQUIRED ABSENCE OF OTHER SPECIFIED PARTS OF DIGESTIVE TRACT: Chronic | ICD-10-CM

## 2023-10-12 DIAGNOSIS — Z98.890 OTHER SPECIFIED POSTPROCEDURAL STATES: Chronic | ICD-10-CM

## 2023-10-17 ENCOUNTER — APPOINTMENT (OUTPATIENT)
Age: 83
End: 2023-10-17

## 2023-10-19 ENCOUNTER — APPOINTMENT (OUTPATIENT)
Age: 83
End: 2023-10-19

## 2023-10-19 ENCOUNTER — OUTPATIENT (OUTPATIENT)
Dept: OUTPATIENT SERVICES | Facility: HOSPITAL | Age: 83
LOS: 1 days | End: 2023-10-19

## 2023-10-19 DIAGNOSIS — Z98.890 OTHER SPECIFIED POSTPROCEDURAL STATES: Chronic | ICD-10-CM

## 2023-10-19 DIAGNOSIS — Z90.49 ACQUIRED ABSENCE OF OTHER SPECIFIED PARTS OF DIGESTIVE TRACT: Chronic | ICD-10-CM

## 2023-10-19 DIAGNOSIS — J44.9 CHRONIC OBSTRUCTIVE PULMONARY DISEASE, UNSPECIFIED: ICD-10-CM

## 2023-10-24 ENCOUNTER — APPOINTMENT (OUTPATIENT)
Age: 83
End: 2023-10-24

## 2023-10-26 ENCOUNTER — APPOINTMENT (OUTPATIENT)
Age: 83
End: 2023-10-26

## 2023-10-27 ENCOUNTER — LABORATORY RESULT (OUTPATIENT)
Age: 83
End: 2023-10-27

## 2023-10-27 ENCOUNTER — APPOINTMENT (OUTPATIENT)
Dept: HEMATOLOGY ONCOLOGY | Facility: CLINIC | Age: 83
End: 2023-10-27
Payer: COMMERCIAL

## 2023-10-27 ENCOUNTER — OUTPATIENT (OUTPATIENT)
Dept: OUTPATIENT SERVICES | Facility: HOSPITAL | Age: 83
LOS: 1 days | End: 2023-10-27
Payer: COMMERCIAL

## 2023-10-27 VITALS
DIASTOLIC BLOOD PRESSURE: 100 MMHG | SYSTOLIC BLOOD PRESSURE: 200 MMHG | HEART RATE: 102 BPM | WEIGHT: 170 LBS | HEIGHT: 60 IN | BODY MASS INDEX: 33.38 KG/M2 | TEMPERATURE: 97.8 F

## 2023-10-27 DIAGNOSIS — Z98.890 OTHER SPECIFIED POSTPROCEDURAL STATES: Chronic | ICD-10-CM

## 2023-10-27 DIAGNOSIS — D68.9 COAGULATION DEFECT, UNSPECIFIED: ICD-10-CM

## 2023-10-27 DIAGNOSIS — I26.99 OTHER PULMONARY EMBOLISM W/OUT ACUTE COR PULMONALE: ICD-10-CM

## 2023-10-27 DIAGNOSIS — Z90.49 ACQUIRED ABSENCE OF OTHER SPECIFIED PARTS OF DIGESTIVE TRACT: Chronic | ICD-10-CM

## 2023-10-27 LAB
HCT VFR BLD CALC: 37.7 %
HGB BLD-MCNC: 12 G/DL
MCHC RBC-ENTMCNC: 29.8 PG
MCHC RBC-ENTMCNC: 31.8 G/DL
MCV RBC AUTO: 93.5 FL
PLATELET # BLD AUTO: 228 K/UL
PMV BLD: 9.1 FL
RBC # BLD: 4.03 M/UL
RBC # FLD: 13.6 %
WBC # FLD AUTO: 7.02 K/UL

## 2023-10-27 PROCEDURE — 85379 FIBRIN DEGRADATION QUANT: CPT

## 2023-10-27 PROCEDURE — 99213 OFFICE O/P EST LOW 20 MIN: CPT

## 2023-10-27 PROCEDURE — 85027 COMPLETE CBC AUTOMATED: CPT

## 2023-10-27 PROCEDURE — 36415 COLL VENOUS BLD VENIPUNCTURE: CPT

## 2023-10-28 DIAGNOSIS — D68.9 COAGULATION DEFECT, UNSPECIFIED: ICD-10-CM

## 2023-10-30 LAB — DEPRECATED D DIMER PPP IA-ACNC: 223 NG/ML DDU

## 2023-10-31 ENCOUNTER — OUTPATIENT (OUTPATIENT)
Dept: OUTPATIENT SERVICES | Facility: HOSPITAL | Age: 83
LOS: 1 days | End: 2023-10-31

## 2023-10-31 ENCOUNTER — APPOINTMENT (OUTPATIENT)
Age: 83
End: 2023-10-31

## 2023-10-31 DIAGNOSIS — Z98.890 OTHER SPECIFIED POSTPROCEDURAL STATES: Chronic | ICD-10-CM

## 2023-10-31 DIAGNOSIS — J44.9 CHRONIC OBSTRUCTIVE PULMONARY DISEASE, UNSPECIFIED: ICD-10-CM

## 2023-10-31 DIAGNOSIS — Z90.49 ACQUIRED ABSENCE OF OTHER SPECIFIED PARTS OF DIGESTIVE TRACT: Chronic | ICD-10-CM

## 2023-11-02 ENCOUNTER — APPOINTMENT (OUTPATIENT)
Age: 83
End: 2023-11-02

## 2023-11-07 ENCOUNTER — APPOINTMENT (OUTPATIENT)
Age: 83
End: 2023-11-07

## 2023-11-09 ENCOUNTER — APPOINTMENT (OUTPATIENT)
Age: 83
End: 2023-11-09

## 2023-11-14 ENCOUNTER — APPOINTMENT (OUTPATIENT)
Age: 83
End: 2023-11-14

## 2023-11-16 ENCOUNTER — OUTPATIENT (OUTPATIENT)
Dept: OUTPATIENT SERVICES | Facility: HOSPITAL | Age: 83
LOS: 1 days | End: 2023-11-16
Payer: COMMERCIAL

## 2023-11-16 ENCOUNTER — APPOINTMENT (OUTPATIENT)
Age: 83
End: 2023-11-16

## 2023-11-16 DIAGNOSIS — Z98.890 OTHER SPECIFIED POSTPROCEDURAL STATES: Chronic | ICD-10-CM

## 2023-11-16 DIAGNOSIS — Z90.49 ACQUIRED ABSENCE OF OTHER SPECIFIED PARTS OF DIGESTIVE TRACT: Chronic | ICD-10-CM

## 2023-11-16 DIAGNOSIS — J44.9 CHRONIC OBSTRUCTIVE PULMONARY DISEASE, UNSPECIFIED: ICD-10-CM

## 2023-11-16 PROCEDURE — 94626 PHY/QHP OP PULM RHB W/MNTR: CPT

## 2023-11-17 DIAGNOSIS — J44.9 CHRONIC OBSTRUCTIVE PULMONARY DISEASE, UNSPECIFIED: ICD-10-CM

## 2023-11-21 ENCOUNTER — APPOINTMENT (OUTPATIENT)
Age: 83
End: 2023-11-21

## 2023-11-30 ENCOUNTER — APPOINTMENT (OUTPATIENT)
Age: 83
End: 2023-11-30

## 2023-11-30 ENCOUNTER — OUTPATIENT (OUTPATIENT)
Dept: OUTPATIENT SERVICES | Facility: HOSPITAL | Age: 83
LOS: 1 days | End: 2023-11-30

## 2023-11-30 DIAGNOSIS — Z98.890 OTHER SPECIFIED POSTPROCEDURAL STATES: Chronic | ICD-10-CM

## 2023-11-30 DIAGNOSIS — J44.9 CHRONIC OBSTRUCTIVE PULMONARY DISEASE, UNSPECIFIED: ICD-10-CM

## 2023-11-30 DIAGNOSIS — Z90.49 ACQUIRED ABSENCE OF OTHER SPECIFIED PARTS OF DIGESTIVE TRACT: Chronic | ICD-10-CM

## 2023-12-14 ENCOUNTER — APPOINTMENT (OUTPATIENT)
Age: 83
End: 2023-12-14

## 2023-12-14 ENCOUNTER — OUTPATIENT (OUTPATIENT)
Dept: OUTPATIENT SERVICES | Facility: HOSPITAL | Age: 83
LOS: 1 days | End: 2023-12-14
Payer: COMMERCIAL

## 2023-12-14 DIAGNOSIS — J44.9 CHRONIC OBSTRUCTIVE PULMONARY DISEASE, UNSPECIFIED: ICD-10-CM

## 2023-12-14 DIAGNOSIS — Z90.49 ACQUIRED ABSENCE OF OTHER SPECIFIED PARTS OF DIGESTIVE TRACT: Chronic | ICD-10-CM

## 2023-12-14 DIAGNOSIS — Z98.890 OTHER SPECIFIED POSTPROCEDURAL STATES: Chronic | ICD-10-CM

## 2023-12-14 PROCEDURE — 94626 PHY/QHP OP PULM RHB W/MNTR: CPT

## 2023-12-15 DIAGNOSIS — J44.9 CHRONIC OBSTRUCTIVE PULMONARY DISEASE, UNSPECIFIED: ICD-10-CM

## 2024-01-08 ENCOUNTER — APPOINTMENT (OUTPATIENT)
Age: 84
End: 2024-01-08

## 2024-01-08 ENCOUNTER — OUTPATIENT (OUTPATIENT)
Dept: OUTPATIENT SERVICES | Facility: HOSPITAL | Age: 84
LOS: 1 days | End: 2024-01-08
Payer: COMMERCIAL

## 2024-01-08 DIAGNOSIS — Z90.49 ACQUIRED ABSENCE OF OTHER SPECIFIED PARTS OF DIGESTIVE TRACT: Chronic | ICD-10-CM

## 2024-01-08 DIAGNOSIS — Z98.890 OTHER SPECIFIED POSTPROCEDURAL STATES: Chronic | ICD-10-CM

## 2024-01-08 PROCEDURE — 94626 PHY/QHP OP PULM RHB W/MNTR: CPT

## 2024-01-09 DIAGNOSIS — J44.9 CHRONIC OBSTRUCTIVE PULMONARY DISEASE, UNSPECIFIED: ICD-10-CM

## 2024-02-05 ENCOUNTER — RX RENEWAL (OUTPATIENT)
Age: 84
End: 2024-02-05

## 2024-02-05 RX ORDER — ATORVASTATIN CALCIUM 10 MG/1
10 TABLET, FILM COATED ORAL
Qty: 46 | Refills: 3 | Status: ACTIVE | COMMUNITY
Start: 2022-05-10 | End: 1900-01-01

## 2024-02-06 ENCOUNTER — OUTPATIENT (OUTPATIENT)
Dept: OUTPATIENT SERVICES | Facility: HOSPITAL | Age: 84
LOS: 1 days | End: 2024-02-06
Payer: COMMERCIAL

## 2024-02-06 ENCOUNTER — APPOINTMENT (OUTPATIENT)
Dept: HEMATOLOGY ONCOLOGY | Facility: CLINIC | Age: 84
End: 2024-02-06
Payer: COMMERCIAL

## 2024-02-06 VITALS
HEIGHT: 60 IN | SYSTOLIC BLOOD PRESSURE: 151 MMHG | HEART RATE: 75 BPM | WEIGHT: 164 LBS | RESPIRATION RATE: 16 BRPM | TEMPERATURE: 98 F | DIASTOLIC BLOOD PRESSURE: 85 MMHG | BODY MASS INDEX: 32.2 KG/M2

## 2024-02-06 DIAGNOSIS — Z90.49 ACQUIRED ABSENCE OF OTHER SPECIFIED PARTS OF DIGESTIVE TRACT: Chronic | ICD-10-CM

## 2024-02-06 DIAGNOSIS — Z98.890 OTHER SPECIFIED POSTPROCEDURAL STATES: Chronic | ICD-10-CM

## 2024-02-06 DIAGNOSIS — I26.99 OTHER PULMONARY EMBOLISM WITHOUT ACUTE COR PULMONALE: ICD-10-CM

## 2024-02-06 PROCEDURE — 99213 OFFICE O/P EST LOW 20 MIN: CPT

## 2024-02-06 NOTE — CONSULT LETTER
[Dear  ___] : Dear  [unfilled], [Courtesy Letter:] : I had the pleasure of seeing your patient, [unfilled], in my office today. [Please see my note below.] : Please see my note below. [Referral Closing:] : Thank you very much for seeing this patient.  If you have any questions, please do not hesitate to contact me. [Sincerely,] : Sincerely, [FreeTextEntry2] : Dr. Enriqueta Landis [FreeTextEntry3] : Dr. RICHARDSON Cooley [DrJuliano  ___] : Dr. DELGADO

## 2024-02-06 NOTE — ASSESSMENT
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 The patient has completed 6 months of anticoagulation several months ago with no new events. At this time, the patient does not need to follow up with us on a regular basis. We will be glad to see her if she is referred back by her internist for whatever reason related to Hem-Onc. The situation was discussed with her and recommended to follow up with her internist's recommendations.   All questions answered.

## 2024-02-06 NOTE — PHYSICAL EXAM
[Fully active, able to carry on all pre-disease performance without restriction] : Status 0 - Fully active, able to carry on all pre-disease performance without restriction [Obese] : obese [Normal] : grossly intact [de-identified] : But somewhat distant heart sounds [de-identified] : Arthritic changes

## 2024-02-06 NOTE — HISTORY OF PRESENT ILLNESS
[Disease:__________________________] : Disease: [unfilled] [de-identified] : The patient is coming for her regularly scheduled follow up for her history of PE and DVT associated with Covid (most likely).  She was treated with anticoagulation for 6 months at least. She has been off the anticoagulation since past July. At present, she has no new particular complaints.

## 2024-02-06 NOTE — REVIEW OF SYSTEMS
[SOB on Exertion] : shortness of breath during exertion [Joint Pain] : joint pain [Joint Stiffness] : joint stiffness [Negative] : Heme/Lymph [FreeTextEntry9] : Seen by rheumatologist ("have arthritis in my joints").

## 2024-02-07 DIAGNOSIS — I26.99 OTHER PULMONARY EMBOLISM WITHOUT ACUTE COR PULMONALE: ICD-10-CM

## 2024-06-03 NOTE — PHYSICAL EXAM
[Well Developed] : well developed [Well Nourished] : well nourished [No Acute Distress] : no acute distress [Normal Conjunctiva] : normal conjunctiva [Normal Venous Pressure] : normal venous pressure [No Carotid Bruit] : no carotid bruit [Normal S1, S2] : normal S1, S2 [No Murmur] : no murmur [No Rub] : no rub [No Gallop] : no gallop [Clear Lung Fields] : clear lung fields [Good Air Entry] : good air entry [No Respiratory Distress] : no respiratory distress  [Soft] : abdomen soft [Non Tender] : non-tender [Moves all extremities] : moves all extremities [No edema] : no edema [No varicosities] : no varicosities [No chronic venous stasis changes] : no chronic venous stasis changes [No rashes] : no rashes [Present] : present bilaterally

## 2024-06-04 ENCOUNTER — APPOINTMENT (OUTPATIENT)
Dept: CARDIOLOGY | Facility: CLINIC | Age: 84
End: 2024-06-04
Payer: COMMERCIAL

## 2024-06-04 VITALS — SYSTOLIC BLOOD PRESSURE: 126 MMHG | HEART RATE: 65 BPM | DIASTOLIC BLOOD PRESSURE: 80 MMHG

## 2024-06-04 VITALS — HEIGHT: 60 IN | WEIGHT: 170 LBS | BODY MASS INDEX: 33.38 KG/M2

## 2024-06-04 DIAGNOSIS — E78.5 HYPERLIPIDEMIA, UNSPECIFIED: ICD-10-CM

## 2024-06-04 DIAGNOSIS — R73.03 PREDIABETES.: ICD-10-CM

## 2024-06-04 DIAGNOSIS — E03.9 HYPOTHYROIDISM, UNSPECIFIED: ICD-10-CM

## 2024-06-04 DIAGNOSIS — Z86.718 PERSONAL HISTORY OF OTHER VENOUS THROMBOSIS AND EMBOLISM: ICD-10-CM

## 2024-06-04 PROCEDURE — 99214 OFFICE O/P EST MOD 30 MIN: CPT | Mod: 25

## 2024-06-04 PROCEDURE — 93000 ELECTROCARDIOGRAM COMPLETE: CPT

## 2024-06-04 RX ORDER — CALCIUM CARBONATE/VITAMIN D3 600 MG-20
TABLET ORAL
Refills: 0 | Status: ACTIVE | COMMUNITY

## 2024-06-04 RX ORDER — DICLOFENAC SODIUM 3 G/100G
3 GEL TOPICAL TWICE DAILY
Qty: 1 | Refills: 0 | Status: COMPLETED | COMMUNITY
Start: 2023-09-05 | End: 2024-06-04

## 2024-06-04 RX ORDER — IBANDRONATE SODIUM 150 MG/1
150 TABLET, FILM COATED ORAL
Refills: 0 | Status: ACTIVE | COMMUNITY

## 2024-06-04 RX ORDER — FUROSEMIDE 40 MG/1
40 TABLET ORAL
Refills: 0 | Status: ACTIVE | COMMUNITY

## 2024-06-04 RX ORDER — GABAPENTIN 300 MG/1
300 CAPSULE ORAL 3 TIMES DAILY
Qty: 90 | Refills: 3 | Status: ACTIVE | COMMUNITY

## 2024-06-04 NOTE — HISTORY OF PRESENT ILLNESS
[FreeTextEntry1] : 84F  (no gDM, gHTN, or preeclampsia) with DLD, aneurysm, hemorrhagic stroke  s/p embo with residual L sided weakness, DVT/PE 2023 s/p Eliquis x6 months for follow-up.   2024 No hospitalizations.   Walks around [employer] and [employer]. Cares for 8 cats. No chest pain, shortness of breath, palpitations, lightheadedness/dizziness, pre-syncope/syncope.  Compliant with medications. LE edema managed with Lasix 40 mg daily and leg elevation   10/3/23 EGAN much improved. Attending pulmonary rehab and very happy with how she is doing. Driving herself places once again, feels like she has her life back.   No chest pain, palpitations, lightheadedness/dizziness, pre-syncope/syncope, or lower extremity edema.   Discontinued Eliquis end of July as per hematology.   23 2 hospitalizations and 1 month of subacute rehab since last visit. PNA, COVID, DVT/PE 2023  Continues to have dyspnea with talking and minimal exertion. No epistaxis, melena or hematuria.   gabapentin 300 mg TID prednisone x5 days Eliquis 5 mg BID  22 Continues to have SOB. No chest pain, palpitations, lightheadedness/dizziness.   BP at home ranging 130//90  over 140 every other day   5/10/22 SOB with 1 flight of stairs, less than 50 feet. Takes 2-3 minutes to recover. No SOB at rest. No chest pain, unsure of leg swelling.   Joint pain neck, back knees shoulders. Sciatica, recently prescribed prednisone.   Quits smoking in her 20s   No history of T2DM, never on treatment for hypertension

## 2024-06-04 NOTE — REVIEW OF SYSTEMS
[Feeling Fatigued] : feeling fatigued [Negative] : Psychiatric [SOB] : no shortness of breath [Dyspnea on exertion] : not dyspnea during exertion [Chest Discomfort] : no chest discomfort [Lower Ext Edema] : no extremity edema [Leg Claudication] : no intermittent leg claudication [Palpitations] : no palpitations [Orthopnea] : no orthopnea [Syncope] : no syncope [Cough] : no cough [Dizziness] : no dizziness

## 2024-06-04 NOTE — ASSESSMENT
[FreeTextEntry1] : Assessment: #PE/DVT 1/2023, provoked (prolonged hospitalization, COVID infection) - R lower lobe PE - LE venous US 1/2023 R CFV, fem, pop DVT, L pop DVT - LE venous US 5/12/23 negative for DVT bilaterally (per report) - TTE 6/6/23 normal LV sys function, normal RV size and function, ePASP 26 mmHg (normal PA pressure) - Eliquis discontinued 7/2023 after 6 months of therapy #DLD - at goal  - 4/21/22 , , HDL 66,  before starting statin - 11/14/22 , , , LDL 94 on atorva 10 - 8/30/23  , , HDL 73, LDL 69 on atorva 10  #S/p IVC filter implant #Hxo cerebral aneurysm  #Pre-DM  5/24/24 , , HDL 76,   on atorva 10  A1c 5.7% K 5.3, Cr 0.85  Plan: - Continue Amlodipine 2.5 mg QD  - Continue atorvastatin 10 mg daily  - On Lasix 40 mg daily for LE edema, okay to reduce to 20 mg daily (she will decide and try at home) - Encouraged plant-based and Mediterranean diets, along with increased fruit, nut, vegetable, legume, and lean vegetable or animal protein (preferably fish) consumption - Engage in at least 150 minutes per week of accumulated moderate-intensity aerobic physical activity or 75 minutes per week of vigorous-intensity aerobic physical activity - Return to clinic 12/2024 or sooner PRN, labs before next visit

## 2024-06-04 NOTE — CARDIOLOGY SUMMARY
[de-identified] : EKG 5/10/22 Normal sinus rhythm 69 bpm EKG 4/18/23 sinus tachy 102 bpm, LAD EKG 10/3/23 Normal sinus rhythm 66 bpm LAD  EKG 6/4/2024 Normal sinus rhythm 66 bpm LAD [de-identified] : 6/8/22 TTE  EF 66%, no AI, no pHTN, hepatic cyst as per pt not a new finding \par  TTE 6/6/23 normal LV sys function, LVEF 68%, normal RV size/function, trace MR, ePASP 26 mmHg (normal) [de-identified] : Nuclear Stress test 6/21/22 EF 55% negative for ischemia\par

## 2024-08-12 ENCOUNTER — RX RENEWAL (OUTPATIENT)
Age: 84
End: 2024-08-12

## 2024-11-11 NOTE — PATIENT PROFILE ADULT - FUNCTIONAL ASSESSMENT - BASIC MOBILITY 2.
Tatiana Jones visit has been disqualified for  back pain . Patient states she didn't realize that provider would connect so soon and is not in a place to do her visit comfortably and would like to reschedule later.   2 = A lot of assistance

## 2024-12-03 ENCOUNTER — APPOINTMENT (OUTPATIENT)
Dept: CARDIOLOGY | Facility: CLINIC | Age: 84
End: 2024-12-03
Payer: COMMERCIAL

## 2024-12-03 VITALS — SYSTOLIC BLOOD PRESSURE: 120 MMHG | DIASTOLIC BLOOD PRESSURE: 70 MMHG

## 2024-12-03 VITALS — HEART RATE: 68 BPM

## 2024-12-03 VITALS — BODY MASS INDEX: 34.55 KG/M2 | WEIGHT: 176 LBS | TEMPERATURE: 97.6 F | HEIGHT: 60 IN

## 2024-12-03 DIAGNOSIS — I10 ESSENTIAL (PRIMARY) HYPERTENSION: ICD-10-CM

## 2024-12-03 DIAGNOSIS — E78.5 HYPERLIPIDEMIA, UNSPECIFIED: ICD-10-CM

## 2024-12-03 PROCEDURE — G2211 COMPLEX E/M VISIT ADD ON: CPT | Mod: NC

## 2024-12-03 PROCEDURE — 99214 OFFICE O/P EST MOD 30 MIN: CPT

## 2024-12-03 PROCEDURE — 93000 ELECTROCARDIOGRAM COMPLETE: CPT

## 2024-12-03 RX ORDER — POTASSIUM CHLORIDE 750 MG/1
10 TABLET, EXTENDED RELEASE ORAL
Refills: 0 | Status: ACTIVE | COMMUNITY

## 2024-12-19 ENCOUNTER — APPOINTMENT (OUTPATIENT)
Dept: PAIN MANAGEMENT | Facility: CLINIC | Age: 84
End: 2024-12-19
Payer: COMMERCIAL

## 2024-12-19 DIAGNOSIS — M17.11 UNILATERAL PRIMARY OSTEOARTHRITIS, RIGHT KNEE: ICD-10-CM

## 2024-12-19 PROCEDURE — 99213 OFFICE O/P EST LOW 20 MIN: CPT | Mod: 25

## 2024-12-19 PROCEDURE — 20610 DRAIN/INJ JOINT/BURSA W/O US: CPT | Mod: RT

## 2024-12-19 PROCEDURE — 99212 OFFICE O/P EST SF 10 MIN: CPT | Mod: 25

## 2024-12-26 DIAGNOSIS — M25.562 PAIN IN RIGHT KNEE: ICD-10-CM

## 2024-12-26 DIAGNOSIS — M25.561 PAIN IN RIGHT KNEE: ICD-10-CM

## 2024-12-30 ENCOUNTER — APPOINTMENT (OUTPATIENT)
Dept: ORTHOPEDIC SURGERY | Facility: CLINIC | Age: 84
End: 2024-12-30
Payer: COMMERCIAL

## 2024-12-30 DIAGNOSIS — M94.20 CHONDROMALACIA, UNSPECIFIED SITE: ICD-10-CM

## 2024-12-30 DIAGNOSIS — S83.231D COMPLEX TEAR OF MEDIAL MENISCUS, CURRENT INJURY, RIGHT KNEE, SUBSEQUENT ENCOUNTER: ICD-10-CM

## 2024-12-30 PROCEDURE — 99203 OFFICE O/P NEW LOW 30 MIN: CPT

## 2024-12-30 PROCEDURE — 73560 X-RAY EXAM OF KNEE 1 OR 2: CPT | Mod: RT

## 2025-01-28 ENCOUNTER — APPOINTMENT (OUTPATIENT)
Facility: CLINIC | Age: 85
End: 2025-01-28
Payer: COMMERCIAL

## 2025-01-28 DIAGNOSIS — M17.11 UNILATERAL PRIMARY OSTEOARTHRITIS, RIGHT KNEE: ICD-10-CM

## 2025-01-28 PROCEDURE — 99213 OFFICE O/P EST LOW 20 MIN: CPT

## 2025-02-18 ENCOUNTER — APPOINTMENT (OUTPATIENT)
Facility: CLINIC | Age: 85
End: 2025-02-18
Payer: COMMERCIAL

## 2025-02-18 DIAGNOSIS — M17.12 UNILATERAL PRIMARY OSTEOARTHRITIS, LEFT KNEE: ICD-10-CM

## 2025-02-18 PROCEDURE — 99213 OFFICE O/P EST LOW 20 MIN: CPT | Mod: 25

## 2025-02-18 PROCEDURE — 20611 DRAIN/INJ JOINT/BURSA W/US: CPT | Mod: RT

## 2025-02-25 ENCOUNTER — APPOINTMENT (OUTPATIENT)
Facility: CLINIC | Age: 85
End: 2025-02-25
Payer: COMMERCIAL

## 2025-02-25 PROCEDURE — 20611 DRAIN/INJ JOINT/BURSA W/US: CPT | Mod: RT

## 2025-03-04 ENCOUNTER — APPOINTMENT (OUTPATIENT)
Facility: CLINIC | Age: 85
End: 2025-03-04
Payer: COMMERCIAL

## 2025-03-04 DIAGNOSIS — M17.11 UNILATERAL PRIMARY OSTEOARTHRITIS, RIGHT KNEE: ICD-10-CM

## 2025-03-04 PROCEDURE — 20611 DRAIN/INJ JOINT/BURSA W/US: CPT | Mod: RT

## 2025-06-03 ENCOUNTER — APPOINTMENT (OUTPATIENT)
Dept: CARDIOLOGY | Facility: CLINIC | Age: 85
End: 2025-06-03

## 2025-07-11 ENCOUNTER — OUTPATIENT (OUTPATIENT)
Dept: OUTPATIENT SERVICES | Facility: HOSPITAL | Age: 85
LOS: 1 days | Discharge: HOME | End: 2025-07-11
Payer: COMMERCIAL

## 2025-07-11 VITALS
DIASTOLIC BLOOD PRESSURE: 96 MMHG | TEMPERATURE: 98 F | SYSTOLIC BLOOD PRESSURE: 136 MMHG | WEIGHT: 169.98 LBS | OXYGEN SATURATION: 96 % | RESPIRATION RATE: 17 BRPM | HEIGHT: 60 IN | HEART RATE: 83 BPM

## 2025-07-11 VITALS — RESPIRATION RATE: 17 BRPM | HEART RATE: 66 BPM | DIASTOLIC BLOOD PRESSURE: 84 MMHG | SYSTOLIC BLOOD PRESSURE: 167 MMHG

## 2025-07-11 DIAGNOSIS — Z90.49 ACQUIRED ABSENCE OF OTHER SPECIFIED PARTS OF DIGESTIVE TRACT: Chronic | ICD-10-CM

## 2025-07-11 DIAGNOSIS — Z98.890 OTHER SPECIFIED POSTPROCEDURAL STATES: Chronic | ICD-10-CM

## 2025-07-11 PROCEDURE — V2788: CPT

## 2025-07-11 PROCEDURE — V2632: CPT

## 2025-07-11 NOTE — ASU PREOP CHECKLIST - AS BP NONINV METHOD
Called patient with labs after discussing with Dr. Flores.  Per Dr. Flores patient to increase imuran to 75 mg daily and make no changes to prednisone.  Repeat labs in 1 month.    electronic

## 2025-07-11 NOTE — ASU PATIENT PROFILE, ADULT - NSICDXPASTSURGICALHX_GEN_ALL_CORE_FT
PAST SURGICAL HISTORY:  H/O carpal tunnel repair     S/P appendectomy     S/P bunionectomy     S/P coil embolization of cerebral aneurysm

## 2025-07-11 NOTE — ASU PATIENT PROFILE, ADULT - FALL HARM RISK - HARM RISK INTERVENTIONS

## 2025-07-16 DIAGNOSIS — Z88.2 ALLERGY STATUS TO SULFONAMIDES: ICD-10-CM

## 2025-07-16 DIAGNOSIS — Z88.0 ALLERGY STATUS TO PENICILLIN: ICD-10-CM

## 2025-07-16 DIAGNOSIS — Z95.828 PRESENCE OF OTHER VASCULAR IMPLANTS AND GRAFTS: ICD-10-CM

## 2025-07-16 DIAGNOSIS — H25.12 AGE-RELATED NUCLEAR CATARACT, LEFT EYE: ICD-10-CM

## 2025-07-16 DIAGNOSIS — E78.5 HYPERLIPIDEMIA, UNSPECIFIED: ICD-10-CM

## 2025-07-16 DIAGNOSIS — E03.9 HYPOTHYROIDISM, UNSPECIFIED: ICD-10-CM

## 2025-07-25 ENCOUNTER — OUTPATIENT (OUTPATIENT)
Dept: OUTPATIENT SERVICES | Facility: HOSPITAL | Age: 85
LOS: 1 days | Discharge: HOME | End: 2025-07-25
Payer: COMMERCIAL

## 2025-07-25 VITALS — HEART RATE: 75 BPM | SYSTOLIC BLOOD PRESSURE: 164 MMHG | DIASTOLIC BLOOD PRESSURE: 76 MMHG | RESPIRATION RATE: 20 BRPM

## 2025-07-25 VITALS
TEMPERATURE: 99 F | HEART RATE: 73 BPM | DIASTOLIC BLOOD PRESSURE: 85 MMHG | OXYGEN SATURATION: 94 % | RESPIRATION RATE: 18 BRPM | SYSTOLIC BLOOD PRESSURE: 134 MMHG | HEIGHT: 60 IN | WEIGHT: 169.98 LBS

## 2025-07-25 DIAGNOSIS — Z90.49 ACQUIRED ABSENCE OF OTHER SPECIFIED PARTS OF DIGESTIVE TRACT: Chronic | ICD-10-CM

## 2025-07-25 DIAGNOSIS — Z98.890 OTHER SPECIFIED POSTPROCEDURAL STATES: Chronic | ICD-10-CM

## 2025-07-25 DIAGNOSIS — H25.11 AGE-RELATED NUCLEAR CATARACT, RIGHT EYE: ICD-10-CM

## 2025-07-25 PROCEDURE — V2632: CPT

## 2025-07-25 PROCEDURE — V2788: CPT

## 2025-07-25 RX ORDER — IBANDRONATE SODIUM 150 MG/1
TABLET ORAL
Refills: 0 | DISCHARGE

## 2025-07-25 NOTE — ASU PATIENT PROFILE, ADULT - FALL HARM RISK - HARM RISK INTERVENTIONS

## 2025-07-25 NOTE — ASU PREOP CHECKLIST - VIA
New Patient Appointment Request    Name of caller: Renee Bustamante    Reason for Visit: Supraventricular Premature contractions , Cardiac arrhythmia    Insurance: [facility]    Insurance ID #: [id number]    Recent Procedures: n/a    Referred by: [facility]    Previous Cardiologist name and phone number: n/a    Best contact number: [phone]]    Additional notes: pt declined 1st available, please advised    stretcher

## 2025-07-25 NOTE — ASU PREOP CHECKLIST - ANTIBIOTIC
Assessment/Plan:         Diagnoses and all orders for this visit:    Herpes zoster without complication  -     valACYclovir (VALTREX) 1,000 mg tablet; Take 1 tablet (1,000 mg total) by mouth 2 (two) times a day for 7 days  Unclear if has recurrent zoster as lesions on both sides and in the area of elastic wasit band and itchy not painful Will treat out of caution and pt agrees  She can return to work tomorrow  Apply hydrocortisone cream bid   She can get shingles vaccine but would wait 3-4 weeks    Anxiety  -     ALPRAZolam (XANAX) 0 5 mg tablet; Take 1 tablet (0 5 mg total) by mouth daily  -     buPROPion (WELLBUTRIN XL) 150 mg 24 hr tablet; Take 1 tablet (150 mg total) by mouth every morning  Pt agrees with sleep specialist to increase Wellbutrin to 150mg daily   Essential hypertension  -     Comprehensive metabolic panel; Future  Bp stable Increase water intake She hopes to be more active in the spring    Hyperlipidemia, unspecified hyperlipidemia type  -     Lipid panel; Future  Low fat diet Labs due end of summer     Hx of hyperglycemia  -     HEMOGLOBIN A1C W/ EAG ESTIMATION; Future  Last A1c less than 6   Vitamin D deficiency  -     Vitamin D 25 hydroxy; Future  She takes Vit d supplement daily         Rto 6 months      Patient ID: Caitlin Alamo is a 81 y o  female  HPI  Pt sent due to lesions low back area ? Recurrent shingles No pain She has some itch No fever or chills Second shingles shot was over a month ago and she had mild flulike sxs for about 10 hours afterwards No fever today No discharge No drainage No open areas No new detergents or changes per patient work wanted her evaluated She denies back pain No change in bowels or bladder function       Review of Systems   Constitutional: Negative  HENT: Negative  Respiratory: Negative  Cardiovascular: Negative  Gastrointestinal: Negative  Genitourinary: Negative  Musculoskeletal: Negative  Skin: Negative  Neurological: Negative  Hematological: Negative  Psychiatric/Behavioral: Negative          Past Medical History:   Diagnosis Date    CPAP (continuous positive airway pressure) dependence     DJD (degenerative joint disease) of cervical spine     Hyperglycemia     last assessed 16    Hypertension     Medullary sponge kidney     Sleep apnea      Past Surgical History:   Procedure Laterality Date    CATARACT EXTRACTION Left 2011     SECTION      unknown onset    CYSTOSCOPY W/ URETERAL STENT PLACEMENT Right     CYSTOSCOPY W/ URETERAL STENT REMOVAL Right 10/27/2015    right stent    DILATION AND CURETTAGE OF UTERUS      of cervical stump unknown onset    OOPHORECTOMY Left     unknown onset    AK REPAIR UMBILICAL BQHR,5+Z/I,HYCVZ N/A 10/23/2018    Procedure: REPAIR HERNIA UMBILICAL;  Surgeon: Johnathan Elena DO;  Location: [facility];  Service: General    TOTAL ABDOMINAL HYSTERECTOMY      w/ removal of both ovaries, last assessed 14     Social History     Socioeconomic History    Marital status: /Civil Union     Spouse name: Not on file    Number of children: Not on file    Years of education: Not on file    Highest education level: Not on file   Occupational History    Not on file   Social Needs    Financial resource strain: Not on file    Food insecurity     Worry: Not on file     Inability: Not on file    Transportation needs     Medical: Not on file     Non-medical: Not on file   Tobacco Use    Smoking status: Never Smoker    Smokeless tobacco: Never Used   Substance and Sexual Activity    Alcohol use: Yes     Comment: 3x per week wine or beer    Drug use: No    Sexual activity: Not on file   Lifestyle    Physical activity     Days per week: Not on file     Minutes per session: Not on file    Stress: Not on file   Relationships    Social connections     Talks on phone: Not on file     Gets together: Not on file     Attends Zoroastrian service: Not on file     Active member of club or organization: Not on file     Attends meetings of clubs or organizations: Not on file     Relationship status: Not on file    Intimate partner violence     Fear of current or ex partner: Not on file     Emotionally abused: Not on file     Physically abused: Not on file     Forced sexual activity: Not on file   Other Topics Concern    Not on file   Social History Narrative    Dental care occasionally    Lives with spouse    No advance directives    No caffeine use    Sun screen worn    Uses seatbelt     No Known Allergies         /72   Pulse 62   Temp 97 5 °F (36 4 °C) (Temporal)   Ht 5' (1 524 m)   Wt 56 7 kg (125 lb)   SpO2 96%   BMI 24 41 kg/m²          Physical Exam  Vitals signs reviewed  Constitutional:       General: She is not in acute distress  Appearance: Normal appearance  She is not ill-appearing, toxic-appearing or diaphoretic  HENT:      Head: Normocephalic and atraumatic  Right Ear: Tympanic membrane, ear canal and external ear normal  There is no impacted cerumen  Left Ear: Tympanic membrane, ear canal and external ear normal  There is no impacted cerumen  Nose: Nose normal       Mouth/Throat:      Mouth: Mucous membranes are dry  Eyes:      General: No scleral icterus  Extraocular Movements: Extraocular movements intact  Conjunctiva/sclera: Conjunctivae normal       Pupils: Pupils are equal, round, and reactive to light  Neck:      Musculoskeletal: Normal range of motion and neck supple  No neck rigidity  Cardiovascular:      Rate and Rhythm: Normal rate and regular rhythm  Pulses: Normal pulses  Heart sounds: Normal heart sounds  Pulmonary:      Effort: Pulmonary effort is normal  No respiratory distress  Breath sounds: Normal breath sounds  No wheezing  Abdominal:      General: Bowel sounds are normal  There is no distension  Palpations: Abdomen is soft  Tenderness: There is no abdominal tenderness  Musculoskeletal: Normal range of motion  General: No swelling or tenderness  Lymphadenopathy:      Cervical: No cervical adenopathy  Skin:     General: Skin is dry  Coloration: Skin is not jaundiced or pale  Findings: No erythema  Neurological:      General: No focal deficit present  Mental Status: She is alert and oriented to person, place, and time  Mental status is at baseline  Cranial Nerves: No cranial nerve deficit  Motor: No weakness  Psychiatric:         Mood and Affect: Mood normal          Behavior: Behavior normal          Thought Content:  Thought content normal          Judgment: Judgment normal        2 circular flat pink lesions on either side of lower spine No other lesions Nonfluctuant and no warmth yes

## 2025-07-29 DIAGNOSIS — H52.201 UNSPECIFIED ASTIGMATISM, RIGHT EYE: ICD-10-CM

## 2025-07-29 DIAGNOSIS — Z88.1 ALLERGY STATUS TO OTHER ANTIBIOTIC AGENTS: ICD-10-CM

## 2025-07-29 DIAGNOSIS — E03.9 HYPOTHYROIDISM, UNSPECIFIED: ICD-10-CM

## 2025-07-29 DIAGNOSIS — H26.8 OTHER SPECIFIED CATARACT: ICD-10-CM

## 2025-07-29 DIAGNOSIS — E78.5 HYPERLIPIDEMIA, UNSPECIFIED: ICD-10-CM

## 2025-08-27 ENCOUNTER — APPOINTMENT (OUTPATIENT)
Facility: CLINIC | Age: 85
End: 2025-08-27

## 2025-09-09 ENCOUNTER — APPOINTMENT (OUTPATIENT)
Dept: ORTHOPEDIC SURGERY | Facility: CLINIC | Age: 85
End: 2025-09-09